# Patient Record
Sex: FEMALE | Race: WHITE | Employment: FULL TIME | URBAN - METROPOLITAN AREA
[De-identification: names, ages, dates, MRNs, and addresses within clinical notes are randomized per-mention and may not be internally consistent; named-entity substitution may affect disease eponyms.]

---

## 2017-02-22 ENCOUNTER — OFFICE VISIT (OUTPATIENT)
Dept: FAMILY MEDICINE | Facility: CLINIC | Age: 32
End: 2017-02-22
Payer: COMMERCIAL

## 2017-02-22 ENCOUNTER — HOSPITAL ENCOUNTER (OUTPATIENT)
Dept: MAMMOGRAPHY | Facility: CLINIC | Age: 32
End: 2017-02-22
Attending: FAMILY MEDICINE
Payer: COMMERCIAL

## 2017-02-22 ENCOUNTER — HOSPITAL ENCOUNTER (OUTPATIENT)
Dept: MAMMOGRAPHY | Facility: CLINIC | Age: 32
Discharge: HOME | End: 2017-02-22
Attending: FAMILY MEDICINE | Admitting: FAMILY MEDICINE
Payer: COMMERCIAL

## 2017-02-22 VITALS
BODY MASS INDEX: 22.58 KG/M2 | DIASTOLIC BLOOD PRESSURE: 68 MMHG | WEIGHT: 149 LBS | RESPIRATION RATE: 16 BRPM | HEIGHT: 68 IN | TEMPERATURE: 97.9 F | HEART RATE: 92 BPM | OXYGEN SATURATION: 99 % | SYSTOLIC BLOOD PRESSURE: 100 MMHG

## 2017-02-22 DIAGNOSIS — F41.1 GENERALIZED ANXIETY DISORDER: ICD-10-CM

## 2017-02-22 DIAGNOSIS — N63.10 LUMP OF RIGHT BREAST: ICD-10-CM

## 2017-02-22 DIAGNOSIS — N63.10 LUMP OF RIGHT BREAST: Primary | ICD-10-CM

## 2017-02-22 PROCEDURE — 76642 ULTRASOUND BREAST LIMITED: CPT | Mod: RT

## 2017-02-22 PROCEDURE — G0204 DX MAMMO INCL CAD BI: HCPCS

## 2017-02-22 PROCEDURE — 99213 OFFICE O/P EST LOW 20 MIN: CPT | Performed by: FAMILY MEDICINE

## 2017-02-22 ASSESSMENT — PATIENT HEALTH QUESTIONNAIRE - PHQ9: 5. POOR APPETITE OR OVEREATING: MORE THAN HALF THE DAYS

## 2017-02-22 ASSESSMENT — ANXIETY QUESTIONNAIRES
6. BECOMING EASILY ANNOYED OR IRRITABLE: SEVERAL DAYS
GAD7 TOTAL SCORE: 12
1. FEELING NERVOUS, ANXIOUS, OR ON EDGE: MORE THAN HALF THE DAYS
7. FEELING AFRAID AS IF SOMETHING AWFUL MIGHT HAPPEN: MORE THAN HALF THE DAYS
5. BEING SO RESTLESS THAT IT IS HARD TO SIT STILL: SEVERAL DAYS
IF YOU CHECKED OFF ANY PROBLEMS ON THIS QUESTIONNAIRE, HOW DIFFICULT HAVE THESE PROBLEMS MADE IT FOR YOU TO DO YOUR WORK, TAKE CARE OF THINGS AT HOME, OR GET ALONG WITH OTHER PEOPLE: SOMEWHAT DIFFICULT
2. NOT BEING ABLE TO STOP OR CONTROL WORRYING: MORE THAN HALF THE DAYS
3. WORRYING TOO MUCH ABOUT DIFFERENT THINGS: MORE THAN HALF THE DAYS

## 2017-02-22 NOTE — PROGRESS NOTES
SUBJECTIVE:                                                    Deb Yepez is a 31 year old female who presents to clinic today for the following health issues:      Anxiety Follow-Up    Status since last visit: Worsened in spring    Other associated symptoms:constant feeling anxious    Complicating factors:   Significant life event: No   Current substance abuse: None  Depression symptoms: No  BRADLEY-7 SCORE 12/26/2011 2/15/2012 4/10/2013   Total Score 3 3 0        GAD7           Amount of exercise or physical activity: 2-3 days/week for an average of 45-60 minutes    Problems taking medications regularly: No    Medication side effects: none  Diet: regular (no restrictions)  Mass on right breast      Duration: 2-3 months    Description (location/character/radiation): inner right breast    Intensity:      Accompanying signs and symptoms: feels like its staying same size    History (similar episodes/previous evaluation): None    Precipitating or alleviating factors: None    Therapies tried and outcome: None     .  Breast lump -     Onset: [id number]    Description:                  When did you first notice the lump: 2 month(s)  Location of lump: lower inner quadrant  Pain or tenderness: no    History:                    First degree relative with breast cancer: a negative family history for breast cancer.    Accompanying Signs & Symptoms:                   Any lumps in axillary region: no                  Movable:  YES                  Nipple discharge:  no                  Changes in the skin or nipple: no                  History of mammogram: no                  On Hormone therapy:  no     Therapies tried and outcome:time with no relief      Family History   Problem Relation Age of Onset     DIABETES Paternal Grandfather      Lipids Father          Problem list and histories reviewed & adjusted, as indicated.  Additional history: as documented    BP Readings from Last 3 Encounters:   02/22/17 100/68   07/08/16  "116/62   05/23/16 126/71    Wt Readings from Last 3 Encounters:   02/22/17 149 lb (67.6 kg)   07/08/16 151 lb (68.5 kg)   05/23/16 153 lb 14.4 oz (69.8 kg)                 ROS:  Constitutional, HEENT, cardiovascular, pulmonary, gi and gu systems are negative, except as otherwise noted.    OBJECTIVE:                                                    /68  Pulse 92  Temp 97.9  F (36.6  C) (Tympanic)  Resp 16  Ht 5' 8\" (1.727 m)  Wt 149 lb (67.6 kg)  LMP 02/04/2017 (Approximate)  SpO2 99%  BMI 22.66 kg/m2  Body mass index is 22.66 kg/(m^2).  GENERAL: healthy, alert and no distress  BREAST: no palpable axillary masses or adenopathy and mass right breast - mobile, pea sized, soft, 2 oclock  MS: no gross musculoskeletal defects noted, no edema  SKIN: no suspicious lesions or rashes  NEURO: Normal strength and tone, mentation intact and speech normal  PSYCH: mentation appears normal, affect normal/bright, anxious, judgement and insight intact and appearance well groomed  LYMPH: no cervical, supraclavicular, axillary, or inguinal adenopathy     ASSESSMENT/PLAN:                                                        Deb was seen today for mass and anxiety.    Diagnoses and all orders for this visit:    Lump of right breast  -     Cancel: US Breast Right Complete 4 Quadrants; Future  -     Cancel: MA Screening Digital Bilateral; Future  -     Cancel: MA Diagnostic Digital Bilateral; Future  -     MA Diagnostic Digital Bilateral; Future  -     US Breast Right Complete 4 Quadrants; Future      Discussed with pt; lump feels the most like benign cyst and is not worrisome on exam, but as has been there for 2 months and pt quite concerned will get mammo/us to [facility].    Discussed anxiety management as well; she's not interested in medications.  Will try below.      Patient Instructions   Call for your mammogram and ultrasound:  Please call to schedule this.    [facility]   Appointment line " "[phone]  or  [facility] Appointment line [phone]      Think about restarting YOGA 1-2 times week.  Sunday church.  --Multifaceted treatment of anxiety and depression is really important!  30-60 minutes of active cardiovascular exercise a day to really helps relieve symptoms.   --Higher intensity yoga or a meditation class can also be very helpful.   --Cognitive Behavioral Therapy can be very helpful.   Google \"CBT for Anxiety\" http://www.helpguide.org/mental/anxiety_therapy.htm  http://www.anxietybc.com/self-help-cognitive-behavioural-therapy-cbt      --Some apurva recommendations:  1) Juice by Mindbloom.  Self awareness, daily check-ins, self care.  2) Headspace - Meditation  3) Happify  4) Start - depression test, pill reminder, mood tracker  5) i am - Daily Positive Reminders  6) Pacifica - Anxiety, Stress, Depression Relief      --Some book recommendations:   1) Mind Over Mood by Segundo Villa   2) Conquering Fear: Living Boldly in an Uncertain World by Rabbi Jef Schulz  3) The Chemistry of Calm/Chemistry of Madeleine by Dr. Krunal Bailey MD  [facility]    "

## 2017-02-22 NOTE — MR AVS SNAPSHOT
"              After Visit Summary   2/22/2017    Deb Yepez    MRN: [id number]           Patient Information     Date Of Birth          1985        Visit Information        Provider Department      2/22/2017 11:45 AM Kristan Bailey MD [facility]        Today's Diagnoses     Lump of right breast    -  1      Care Instructions    Call for your mammogram and ultrasound:  Please call to schedule this.    [facility] [place]   Appointment line [phone]  or  [facility] Appointment line [phone]      Think about restarting YOGA 1-2 times week.  Sunday Christian.  --Multifaceted treatment of anxiety and depression is really important!  30-60 minutes of active cardiovascular exercise a day to really helps relieve symptoms.   --Higher intensity yoga or a meditation class can also be very helpful.   --Cognitive Behavioral Therapy can be very helpful.   Google \"CBT for Anxiety\" http://www.helpguLinki.org/mental/anxiety_therapy.htm  http://www.anxietybc.com/self-help-cognitive-behavioural-therapy-cbt      --Some apurva recommendations:  1) Juice by Mindbloom.  Self awareness, daily check-ins, self care.  2) Headspace - Meditation  3) Happify  4) Start - depression test, pill reminder, mood tracker  5) i am - Daily Positive Reminders  6) [place]a - Anxiety, Stress, Depression Relief      --Some book recommendations:   1) Mind Over Mood by Segundo Villa   2) Conquering Fear: Living Boldly in an Uncertain World by Rabbi Jef Schulz  3) The Chemistry of Calm/Chemistry of Madeleine by Dr. Krunal Kiser                Follow-ups after your visit        Future tests that were ordered for you today     Open Future Orders        Priority Expected Expires Ordered    US Breast Right Complete 4 Quadrants Routine  2/23/2018 2/22/2017    MA Screening Digital Bilateral Routine  2/23/2018 2/22/2017            Who to contact     If you have questions or need follow up " "information about today's clinic visit or your schedule please contact [facility] directly at [phone].  Normal or non-critical lab and imaging results will be communicated to you by MyChart, letter or phone within 4 business days after the clinic has received the results. If you do not hear from us within 7 days, please contact the clinic through Track[employer]hart or phone. If you have a critical or abnormal lab result, we will notify you by phone as soon as possible.  Submit refill requests through [employer] or call your pharmacy and they will forward the refill request to us. Please allow 3 business days for your refill to be completed.          Additional Information About Your Visit        TrackIFharZeo Information     [employer] gives you secure access to your electronic health record. If you see a primary care provider, you can also send messages to your care team and make appointments. If you have questions, please call your primary care clinic.  If you do not have a primary care provider, please call [phone] and they will assist you.        Care EveryWhere ID     This is your Care EveryWhere ID. This could be used by other organizations to access your [facility] medical records  [id number]        Your Vitals Were     Pulse Temperature Respirations Height Last Period Pulse Oximetry    92 97.9  F (36.6  C) (Tympanic) 16 5' 8\" (1.727 m) 02/04/2017 (Approximate) 99%    BMI (Body Mass Index)                   22.66 kg/m2            Blood Pressure from Last 3 Encounters:   02/22/17 100/68   07/08/16 116/62   05/23/16 126/71    Weight from Last 3 Encounters:   02/22/17 149 lb (67.6 kg)   07/08/16 151 lb (68.5 kg)   05/23/16 153 lb 14.4 oz (69.8 kg)               Primary Care Provider Office Phone # Fax #    Kristan Bailey [phone]       [facility] [address] [facility] [address]        Thank you!     Thank you for choosing [facility] " [facility]  for your care. Our goal is always to provide you with excellent care. Hearing back from our patients is one way we can continue to improve our services. Please take a few minutes to complete the written survey that you may receive in the mail after your visit with us. Thank you!             Your Updated Medication List - Protect others around you: Learn how to safely use, store and throw away your medicines at www.disposemymeds.org.          This list is accurate as of: 2/22/17 12:20 PM.  Always use your most recent med list.                   Brand Name Dispense Instructions for use    cephALEXin 500 MG capsule    KEFLEX     Take 500 mg by mouth 4 times daily       MULTIVITAMIN PO      Take  by mouth daily.       norethindrone 0.35 MG per tablet    MICRONOR    90 tablet    Take 1 tablet (0.35 mg) by mouth daily       predniSONE 20 MG tablet    DELTASONE    20 tablet    Take 3 tabs (60 mg) orally daily for 3 days, 2 tabs (40 mg) orally daily for 3 days, 1 tab (20 mg) orally daily for 3 days, then 1/2 tab (10 mg) orally for 3 days       SUMAtriptan 50 MG tablet    IMITREX    9 tablet    Take 1 tablet (50 mg) by mouth at onset of headache for migraine May repeat dose in 2 hours.  Do not exceed 200 mg in 24 hours

## 2017-02-22 NOTE — PATIENT INSTRUCTIONS
"Call for your mammogram and ultrasound:  Please call to schedule this.    [facility]   Appointment line [phone]  or  [facility] Appointment line [phone]      Think about restarting YOGA 1-2 times week.  Sunday Hoah[employer].  --Multifaceted treatment of anxiety and depression is really important!  30-60 minutes of active cardiovascular exercise a day to really helps relieve symptoms.   --Higher intensity yoga or a meditation class can also be very helpful.   --Cognitive Behavioral Therapy can be very helpful.   Google \"CBT for Anxiety\" http://www.helpguide.org/mental/anxiety_therapy.htm  http://www.anxietybc.com/self-help-cognitive-behavioural-therapy-cbt      --Some apurva recommendations:  1) Juice by Mindbloom.  Self awareness, daily check-ins, self care.  2) Headspace - Meditation  3) Happify  4) Start - depression test, pill reminder, mood tracker  5) i am - Daily Positive Reminders  6) Pacifica - Anxiety, Stress, Depression Relief      --Some book recommendations:   1) Mind Over Mood by Segundo Villa   2) Conquering Fear: Living Boldly in an Uncertain World by Rabbi Jef Schulz  3) The Chemistry of Calm/Chemistry of Madeleine by Dr. Krunal Kiser          "

## 2017-02-22 NOTE — NURSING NOTE
"Chief Complaint   Patient presents with     Mass     Anxiety       Initial /68  Pulse 92  Temp 97.9  F (36.6  C) (Tympanic)  Resp 16  Ht 5' 8\" (1.727 m)  Wt 149 lb (67.6 kg)  LMP 02/04/2017 (Approximate)  SpO2 99%  BMI 22.66 kg/m2 Estimated body mass index is 22.66 kg/(m^2) as calculated from the following:    Height as of this encounter: 5' 8\" (1.727 m).    Weight as of this encounter: 149 lb (67.6 kg).  Medication Reconciliation: complete   .Jairo BROWN      "

## 2017-02-22 NOTE — PROGRESS NOTES
Bree Boyd:  Great news!  Your mammogram looks normal and reassuring.  The breast center will send along a more detailed report.  Let us know if you have any questions about this.  Best,  Dr. Kristan Bailey MD  [facility]  [phone]

## 2017-02-23 ASSESSMENT — ANXIETY QUESTIONNAIRES: GAD7 TOTAL SCORE: 12

## 2017-05-04 DIAGNOSIS — Z30.41 ENCOUNTER FOR SURVEILLANCE OF CONTRACEPTIVE PILLS: ICD-10-CM

## 2017-05-04 NOTE — TELEPHONE ENCOUNTER
norethindrone (MICRONOR) 0.35 MG per tablet      Last Written Prescription Date: 5/23/16  Last Fill Quantity: 90,  # refills: 3   Last Office Visit with [facility]ISMAEL, [facility] or [facility] prescribing provider: 7/8/16

## 2017-05-05 RX ORDER — ACETAMINOPHEN AND CODEINE PHOSPHATE 120; 12 MG/5ML; MG/5ML
1 SOLUTION ORAL DAILY
Qty: 90 TABLET | Refills: 0 | Status: SHIPPED | OUTPATIENT
Start: 2017-05-05 | End: 2017-07-28

## 2017-05-05 RX ORDER — ACETAMINOPHEN AND CODEINE PHOSPHATE 120; 12 MG/5ML; MG/5ML
1 SOLUTION ORAL DAILY
Qty: 90 TABLET | Refills: 3 | OUTPATIENT
Start: 2017-05-05

## 2017-05-05 NOTE — TELEPHONE ENCOUNTER
Patient calling to note that she is completely out of this medication and the pharmacy deleted the first request earlier in the week  Please send Rx to [facility]/pharmacy #[id number] - [place], [place] - [address]

## 2017-05-05 NOTE — TELEPHONE ENCOUNTER
Prescription approved per [facility] Refill Protocol for 12 months of refills since last appointment, which was 7/8/16

## 2017-07-28 DIAGNOSIS — Z30.41 ENCOUNTER FOR SURVEILLANCE OF CONTRACEPTIVE PILLS: ICD-10-CM

## 2017-07-31 RX ORDER — ACETAMINOPHEN AND CODEINE PHOSPHATE 120; 12 MG/5ML; MG/5ML
SOLUTION ORAL
Qty: 84 TABLET | Refills: 1 | Status: SHIPPED | OUTPATIENT
Start: 2017-07-31 | End: 2017-12-08

## 2017-07-31 NOTE — TELEPHONE ENCOUNTER
NORETHINDRONE 0.35 MG TABLET      Last Written Prescription Date: 7/28/17  Last Fill Quantity: 90,  # refills: 2   Last Office Visit with [facility], [facility] or [facility] prescribing provider: 2/22/17

## 2017-12-08 ENCOUNTER — OFFICE VISIT (OUTPATIENT)
Dept: FAMILY MEDICINE | Facility: CLINIC | Age: 32
End: 2017-12-08
Payer: COMMERCIAL

## 2017-12-08 VITALS
OXYGEN SATURATION: 100 % | TEMPERATURE: 98.2 F | SYSTOLIC BLOOD PRESSURE: 105 MMHG | HEART RATE: 93 BPM | WEIGHT: 147 LBS | BODY MASS INDEX: 23.07 KG/M2 | DIASTOLIC BLOOD PRESSURE: 62 MMHG | HEIGHT: 67 IN

## 2017-12-08 DIAGNOSIS — R10.2 PELVIC PAIN: Primary | ICD-10-CM

## 2017-12-08 LAB
ALBUMIN UR-MCNC: NEGATIVE MG/DL
APPEARANCE UR: CLEAR
BILIRUB UR QL STRIP: NEGATIVE
COLOR UR AUTO: YELLOW
GLUCOSE UR STRIP-MCNC: NEGATIVE MG/DL
HGB UR QL STRIP: NEGATIVE
KETONES UR STRIP-MCNC: NEGATIVE MG/DL
LEUKOCYTE ESTERASE UR QL STRIP: NEGATIVE
NITRATE UR QL: NEGATIVE
PH UR STRIP: 7 PH (ref 5–7)
SOURCE: NORMAL
SP GR UR STRIP: 1.01 (ref 1–1.03)
UROBILINOGEN UR STRIP-ACNC: 0.2 EU/DL (ref 0.2–1)

## 2017-12-08 PROCEDURE — 99214 OFFICE O/P EST MOD 30 MIN: CPT | Performed by: FAMILY MEDICINE

## 2017-12-08 PROCEDURE — 81003 URINALYSIS AUTO W/O SCOPE: CPT | Performed by: FAMILY MEDICINE

## 2017-12-08 NOTE — MR AVS SNAPSHOT
After Visit Summary   12/8/2017    Deb Yepez    MRN: [id number]           Patient Information     Date Of Birth          1985        Visit Information        Provider Department      12/8/2017 2:00 PM Kristan Bailey MD [facility]        Today's Diagnoses     Pelvic pain    -  1      Care Instructions    1) Call for ultrasound if this doesn't get better in the next few days or week.  Call to schedule:  For [facility] or [facility] call [phone].    2) If this doesn't get better, let's look at kidney stones as a possibility.  This would involve a CT scan - send me a mychart evisit if you are not improving and we can order this.      Pelvic Pain, Uncertain Cause    Pelvic pain is pain felt in the lowest part of the belly (abdomen) and between the hipbones. The pain may be acute. This means it occurred suddenly and recently. Or the pain may be chronic. This means it has occurred for 6 months or longer.  There are many possible causes of pelvic pain. The pain may be due to a problem in the female reproductive system (pictured here). Or, it may be due to a problem in the digestive, urinary, or musculoskeletal systems.  Based on your visit today, the exact cause of your pelvic pain is not certain. Your condition does not appear to be serious at this time. But it is important for you to keep watching for any new symptoms or worsening of your condition.  General care  Your healthcare provider may advise a number of ways to help manage your pain. These can include:    Taking over-the-counter pain medicine. Stronger pain medicine may also be prescribed, if needed.    Applying heat to the pelvic area. Use a heating pad or a hot pack. Taking a hot bath may also help.    Getting plenty of rest.    Making certain lifestyle changes. These can include practicing good posture and getting regular exercise. (Studies have shown that these changes help reduce pelvic  pain in some women.)    Seeing a physical therapist or pain specialist. These healthcare providers can discuss other ways to manage pain with you.  Follow-up care  Follow up with your healthcare provider, or as advised.   When to seek medical advice  Call your healthcare provider right away if any of the following occur:    Fever of 100.4 F or higher, or as directed by your healthcare provider    Pain worsens or you have sudden, severe pain or new pain    Nausea, vomiting, sweating, or restlessness    Dizziness or fainting    Unusual vaginal discharge    Abnormal vaginal bleeding (especially bleeding after menopause)  Date Last Reviewed: 6/11/2015 2000-2017 [employer]. [address] [place] [address]. All rights reserved. This information is not intended as a substitute for professional medical care. Always follow your healthcare professional's instructions.                Follow-ups after your visit        Future tests that were ordered for you today     Open Future Orders        Priority Expected Expires Ordered    US Pelvic Complete with Transvaginal Routine  12/9/2018 12/8/2017            Who to contact     If you have questions or need follow up information about today's clinic visit or your schedule please contact [facility] directly at [phone].  Normal or non-critical lab and imaging results will be communicated to you by MyChart, letter or phone within 4 business days after the clinic has received the results. If you do not hear from us within 7 days, please contact the clinic through MyChart or phone. If you have a critical or abnormal lab result, we will notify you by phone as soon as possible.  Submit refill requests through [employer] or call your pharmacy and they will forward the refill request to us. Please allow 3 business days for your refill to be completed.          Additional Information About Your Visit        MyChart Information   "   [employer] gives you secure access to your electronic health record. If you see a primary care provider, you can also send messages to your care team and make appointments. If you have questions, please call your primary care clinic.  If you do not have a primary care provider, please call [phone] and they will assist you.        Care EveryWhere ID     This is your Care EveryWhere ID. This could be used by other organizations to access your [facility] medical records  [id number]        Your Vitals Were     Pulse Temperature Height Last Period Pulse Oximetry Breastfeeding?    93 98.2  F (36.8  C) (Oral) 5' 7\" (1.702 m) 11/08/2017 (Approximate) 100% No    BMI (Body Mass Index)                   23.02 kg/m2            Blood Pressure from Last 3 Encounters:   12/08/17 105/62   02/22/17 100/68   07/08/16 116/62    Weight from Last 3 Encounters:   12/08/17 147 lb (66.7 kg)   02/22/17 149 lb (67.6 kg)   07/08/16 151 lb (68.5 kg)              We Performed the Following     UA reflex to Microscopic        Primary Care Provider Office Phone # Fax #    Kristan Bailey [phone] [address] [id number]        Equal Access to Services     TERI CARTER : Hadii evie ku hadasho Soomaali, waaxda luqadaha, qaybta kaalmada adeegyada, waxay jaceyin lencho banuelos. So [facility] [phone].    ATENCIÓN: Si habla español, tiene a hobson disposición servicios gratuitos de asistencia lingüística. Llame al [phone].    We comply with applicable federal civil rights laws and [place] laws. We do not discriminate on the basis of race, color, national origin, age, disability, sex, sexual orientation, or gender identity.            Thank you!     Thank you for choosing [facility]  for your care. Our goal is always to provide you with excellent care. Hearing back from our patients is one way we can continue to improve our services. Please take a few minutes " to complete the written survey that you may receive in the mail after your visit with us. Thank you!             Your Updated Medication List - Protect others around you: Learn how to safely use, store and throw away your medicines at www.disposemymeds.org.          This list is accurate as of: 12/8/17  2:45 PM.  Always use your most recent med list.                   Brand Name Dispense Instructions for use Diagnosis    MULTIVITAMIN PO      Take  by mouth daily.        * norethindrone 0.35 MG per tablet    MICRONOR    90 tablet    Take 1 tablet (0.35 mg) by mouth daily    Encounter for surveillance of contraceptive pills       * norethindrone 0.35 MG per tablet    MICRONOR    84 tablet    TAKE 1 TABLET (0.35 MG) BY MOUTH DAILY    Encounter for surveillance of contraceptive pills       SUMAtriptan 50 MG tablet    IMITREX    9 tablet    Take 1 tablet (50 mg) by mouth at onset of headache for migraine May repeat dose in 2 hours.  Do not exceed 200 mg in 24 hours    Migraine aura without headache       * Notice:  This list has 2 medication(s) that are the same as other medications prescribed for you. Read the directions carefully, and ask your doctor or other care provider to review them with you.

## 2017-12-08 NOTE — PATIENT INSTRUCTIONS
1) Call for ultrasound if this doesn't get better in the next few days or week.  Call to schedule:  For [place] or [facility] call [phone].    2) If this doesn't get better, let's look at kidney stones as a possibility.  This would involve a CT scan - send me a mychart evisit if you are not improving and we can order this.      Pelvic Pain, Uncertain Cause    Pelvic pain is pain felt in the lowest part of the belly (abdomen) and between the hipbones. The pain may be acute. This means it occurred suddenly and recently. Or the pain may be chronic. This means it has occurred for 6 months or longer.  There are many possible causes of pelvic pain. The pain may be due to a problem in the female reproductive system (pictured here). Or, it may be due to a problem in the digestive, urinary, or musculoskeletal systems.  Based on your visit today, the exact cause of your pelvic pain is not certain. Your condition does not appear to be serious at this time. But it is important for you to keep watching for any new symptoms or worsening of your condition.  General care  Your healthcare provider may advise a number of ways to help manage your pain. These can include:    Taking over-the-counter pain medicine. Stronger pain medicine may also be prescribed, if needed.    Applying heat to the pelvic area. Use a heating pad or a hot pack. Taking a hot bath may also help.    Getting plenty of rest.    Making certain lifestyle changes. These can include practicing good posture and getting regular exercise. (Studies have shown that these changes help reduce pelvic pain in some women.)    Seeing a physical therapist or pain specialist. These healthcare providers can discuss other ways to manage pain with you.  Follow-up care  Follow up with your healthcare provider, or as advised.   When to seek medical advice  Call your healthcare provider right away if any of the following occur:    Fever of 100.4 F or higher, or as directed  by your healthcare provider    Pain worsens or you have sudden, severe pain or new pain    Nausea, vomiting, sweating, or restlessness    Dizziness or fainting    Unusual vaginal discharge    Abnormal vaginal bleeding (especially bleeding after menopause)  Date Last Reviewed: 6/11/2015 2000-2017 [employer]. [address] [place] [address]. All rights reserved. This information is not intended as a substitute for professional medical care. Always follow your healthcare professional's instructions.

## 2017-12-08 NOTE — PROGRESS NOTES
SUBJECTIVE:   Deb Yepez is a 32 year old female who presents to clinic today for the following health issues:      ABDOMINAL   PAIN     Onset: x 2 week     Description:   Character: Dull ache - sharp pain with movement   Location: left upper quadrant left lower quadrant  Radiation: lower back     Intensity: 5/10    Progression of Symptoms:  same    Accompanying Signs & Symptoms:  Fever/Chills?: no   Gas/Bloating: no   Nausea: no   Vomitting: no   Diarrhea?: no   Constipation:no   Dysuria or Hematuria: no    History:   Trauma: no   Previous similar pain: no    Previous tests done: none    Precipitating factors:   Does the pain change with:     Food: no      BM: no     Urination: no     Alleviating factors:    None         Therapies Tried and outcome:     Self stopped - drinking coffee and alcohol - Did not notice a difference.     Has tried liquid diet - shows no improvement     LMP:  Patient's last menstrual period was 11/08/2017 (approximate).         Started the Sunday after Thanksgiving.  Woke her up at night and was shooting pain.  Now still dull achy pain in left side and left lowerside.  Radiates to back.      Has tried cutting out alcohol, coffee, etc.  Tried liquid diet.  No change.  Doesn't seem related to eating.  In mornings notice it less.  During work day isn't sobad.  If moves really fast can feel it.  But sneezed this morning and was okay.  In evenings notices it more.      Tried to do less workout, no weights.  Doesn't feel like muscle strain.      Not worse when urinates.  Dad had kidney stones.      Problem list and histories reviewed & adjusted, as indicated.  Additional history: as documented    Reviewed and updated as needed this visit by clinical staffTobacco  Allergies  Meds  Problems  Med Hx  Surg Hx  Fam Hx  Soc Hx        Reviewed and updated as needed this visit by Provider  Allergies  Meds  Problems         ROS:  Constitutional, HEENT, cardiovascular, pulmonary, gi and gu  "systems are negative, except as otherwise noted.      OBJECTIVE:   /62 (BP Location: Left arm, Patient Position: Chair, Cuff Size: Adult Regular)  Pulse 93  Temp 98.2  F (36.8  C) (Oral)  Ht 5' 7\" (1.702 m)  Wt 147 lb (66.7 kg)  LMP 11/08/2017 (Approximate)  SpO2 100%  Breastfeeding? No  BMI 23.02 kg/m2  Body mass index is 23.02 kg/(m^2).  GENERAL: healthy, alert and no distress  NECK: no adenopathy, no asymmetry, masses, or scars and thyroid normal to palpation  RESP: lungs clear to auscultation - no rales, rhonchi or wheezes  CV: regular rate and rhythm, normal S1 S2, no S3 or S4, no murmur, click or rub, no peripheral edema and peripheral pulses strong  ABDOMEN: tenderness RLQ, no organomegaly or masses, liver span normal to percussion, bowel sounds normal and no palpable or pulsatile masses  MS: no gross musculoskeletal defects noted, no edema    Diagnostic Test Results:  Unresulted Labs Ordered in the Past 30 Days of this Admission     No orders found from 10/9/2017 to 12/9/2017.            ASSESSMENT/PLAN:     Deb was seen today for abdominal pain and imm/inj.    Diagnoses and all orders for this visit:    Pelvic pain  -     US Pelvic Complete with Transvaginal; Future  -     UA reflex to Microscopic        Patient Instructions   1) Call for ultrasound if this doesn't get better in the next few days or week.  Call to schedule:  For [place] or the [place] call [phone].    2) If this doesn't get better, let's look at kidney stones as a possibility.  This would involve a CT scan - send me a mychart evisit if you are not improving and we can order this.      Pelvic Pain, Uncertain Cause    Pelvic pain is pain felt in the lowest part of the belly (abdomen) and between the hipbones. The pain may be acute. This means it occurred suddenly and recently. Or the pain may be chronic. This means it has occurred for 6 months or longer.  There are many possible causes of pelvic pain. The pain may be " due to a problem in the female reproductive system (pictured here). Or, it may be due to a problem in the digestive, urinary, or musculoskeletal systems.  Based on your visit today, the exact cause of your pelvic pain is not certain. Your condition does not appear to be serious at this time. But it is important for you to keep watching for any new symptoms or worsening of your condition.  General care  Your healthcare provider may advise a number of ways to help manage your pain. These can include:    Taking over-the-counter pain medicine. Stronger pain medicine may also be prescribed, if needed.    Applying heat to the pelvic area. Use a heating pad or a hot pack. Taking a hot bath may also help.    Getting plenty of rest.    Making certain lifestyle changes. These can include practicing good posture and getting regular exercise. (Studies have shown that these changes help reduce pelvic pain in some women.)    Seeing a physical therapist or pain specialist. These healthcare providers can discuss other ways to manage pain with you.  Follow-up care  Follow up with your healthcare provider, or as advised.   When to seek medical advice  Call your healthcare provider right away if any of the following occur:    Fever of 100.4 F or higher, or as directed by your healthcare provider    Pain worsens or you have sudden, severe pain or new pain    Nausea, vomiting, sweating, or restlessness    Dizziness or fainting    Unusual vaginal discharge    Abnormal vaginal bleeding (especially bleeding after menopause)  Date Last Reviewed: 6/11/2015 2000-2017 [employer] [address] [place] [address], [place] [address]. All rights reserved. This information is not intended as a substitute for professional medical care. Always follow your healthcare professional's instructions.            Kristan Bailey MD  [facility]

## 2017-12-08 NOTE — PROGRESS NOTES
Good news, Deb!   Your results are normal.  Let me know if you have any questions.  Dr. Kristan Bailey MD  [facility]  [phone]

## 2017-12-08 NOTE — NURSING NOTE
"Chief Complaint   Patient presents with     Abdominal Pain       Initial /62 (BP Location: Left arm, Patient Position: Chair, Cuff Size: Adult Regular)  Pulse 93  Temp 98.2  F (36.8  C) (Oral)  Ht 5' 7\" (1.702 m)  Wt 147 lb (66.7 kg)  LMP 11/08/2017 (Approximate)  SpO2 100%  Breastfeeding? No  BMI 23.02 kg/m2 Estimated body mass index is 23.02 kg/(m^2) as calculated from the following:    Height as of this encounter: 5' 7\" (1.702 m).    Weight as of this encounter: 147 lb (66.7 kg).  Medication Reconciliation: complete     Xenia Jimenez MA     "

## 2017-12-22 ENCOUNTER — E-VISIT (OUTPATIENT)
Dept: FAMILY MEDICINE | Facility: CLINIC | Age: 32
End: 2017-12-22
Payer: COMMERCIAL

## 2017-12-22 DIAGNOSIS — Z53.9 ERRONEOUS ENCOUNTER--DISREGARD: Primary | ICD-10-CM

## 2018-04-10 ENCOUNTER — OFFICE VISIT (OUTPATIENT)
Dept: FAMILY MEDICINE | Facility: CLINIC | Age: 33
End: 2018-04-10
Payer: COMMERCIAL

## 2018-04-10 VITALS
TEMPERATURE: 99.1 F | WEIGHT: 146 LBS | HEIGHT: 67 IN | SYSTOLIC BLOOD PRESSURE: 122 MMHG | OXYGEN SATURATION: 99 % | DIASTOLIC BLOOD PRESSURE: 72 MMHG | BODY MASS INDEX: 22.91 KG/M2 | HEART RATE: 112 BPM

## 2018-04-10 DIAGNOSIS — Z00.00 ROUTINE GENERAL MEDICAL EXAMINATION AT A HEALTH CARE FACILITY: Primary | ICD-10-CM

## 2018-04-10 DIAGNOSIS — N63.0 LUMP OR MASS IN BREAST: ICD-10-CM

## 2018-04-10 DIAGNOSIS — Z12.4 SCREENING FOR MALIGNANT NEOPLASM OF CERVIX: ICD-10-CM

## 2018-04-10 DIAGNOSIS — F41.1 GENERALIZED ANXIETY DISORDER: ICD-10-CM

## 2018-04-10 PROCEDURE — 87624 HPV HI-RISK TYP POOLED RSLT: CPT | Performed by: FAMILY MEDICINE

## 2018-04-10 PROCEDURE — G0145 SCR C/V CYTO,THINLAYER,RESCR: HCPCS | Performed by: FAMILY MEDICINE

## 2018-04-10 PROCEDURE — 84443 ASSAY THYROID STIM HORMONE: CPT | Performed by: FAMILY MEDICINE

## 2018-04-10 PROCEDURE — 80061 LIPID PANEL: CPT | Performed by: FAMILY MEDICINE

## 2018-04-10 PROCEDURE — 36415 COLL VENOUS BLD VENIPUNCTURE: CPT | Performed by: FAMILY MEDICINE

## 2018-04-10 PROCEDURE — 99395 PREV VISIT EST AGE 18-39: CPT | Performed by: FAMILY MEDICINE

## 2018-04-10 PROCEDURE — 99213 OFFICE O/P EST LOW 20 MIN: CPT | Mod: 25 | Performed by: FAMILY MEDICINE

## 2018-04-10 NOTE — PROGRESS NOTES
SUBJECTIVE:   CC: Deb Yepez is an 32 year old woman who presents for preventive health visit.     Physical   Annual:     Getting at least 3 servings of Calcium per day::  Yes    Bi-annual eye exam::  Yes    Dental care twice a year::  Yes    Sleep apnea or symptoms of sleep apnea::  None    Diet::  Regular (no restrictions)    Frequency of exercise::  2-3 days/week    Duration of exercise::  15-30 minutes    Taking medications regularly::  Yes    Medication side effects::  Not applicable    Additional concerns today::  YES            Also, she has additional complaints of:  Pt feels like breast tissue is changing in last 1 month, had similar last year had mammo things seemed normal, also swollen glands in neck about 3 weeks.    Also, anxiety is worse.  Getting  in June, doesn't know if that's part of it?  Switched jobs which is good; but still hard to do self care.  Doesn't see a therapist.  Medications worked well in past but really doesn't want to be on medications longterm.    Today's PHQ-2 Score:   PHQ-2 ( 1999 [employer]) 4/10/2018   Q1: Little interest or pleasure in doing things 0   Q2: Feeling down, depressed or hopeless 1   PHQ-2 Score 1   Q1: Little interest or pleasure in doing things Not at all   Q2: Feeling down, depressed or hopeless Several days   PHQ-2 Score 1       Abuse: Current or Past(Physical, Sexual or Emotional)- No  Do you feel safe in your environment - Yes    Social History   Substance Use Topics     Smoking status: Former Smoker     Smokeless tobacco: Never Used     Alcohol use Yes      Comment: occ     Alcohol Use 4/10/2018   If you drink alcohol do you typically have greater than 3 drinks per day OR greater than 7 drinks per week? No       Reviewed orders with patient.  Reviewed health maintenance and updated orders accordingly - Yes  Labs reviewed in EPIC        Pertinent mammograms are reviewed under the imaging tab.  History of abnormal Pap smear:   YES - updated in Problem  "List and Health Maintenance accordingly  Last 3 Pap and HPV Results:   PAP / HPV Latest Ref Rng & Units 4/23/2015 4/14/2014 2/15/2012   PAP - NIL LSIL(A) NIL   HPV 16 DNA NEG Negative - -   HPV 18 DNA NEG Negative - -   OTHER HR HPV NEG Negative - -       Reviewed and updated as needed this visit by clinical staff  Tobacco  Allergies  Meds  Problems  Med Hx  Surg Hx  Fam Hx  Soc Hx          Reviewed and updated as needed this visit by Provider  Allergies  Meds  Problems            Review of Systems  C: NEGATIVE for fever, chills, change in weight  I: NEGATIVE for worrisome rashes, moles or lesions  E: NEGATIVE for vision changes or irritation  ENT: NEGATIVE for ear, mouth and throat problems  R: NEGATIVE for significant cough or SOB  B: NEGATIVE for masses, tenderness or discharge  CV: NEGATIVE for chest pain, palpitations or peripheral edema  GI: NEGATIVE for nausea, abdominal pain, heartburn, or change in bowel habits  : NEGATIVE for unusual urinary or vaginal symptoms. Periods are regular.  M: NEGATIVE for significant arthralgias or myalgia  N: NEGATIVE for weakness, dizziness or paresthesias  P: NEGATIVE for changes in mood or affect     OBJECTIVE:   /72 (Cuff Size: Adult Regular)  Pulse 112  Temp 99.1  F (37.3  C) (Tympanic)  Ht 5' 7\" (1.702 m)  Wt 146 lb (66.2 kg)  LMP 03/16/2018 (Approximate)  SpO2 99%  BMI 22.87 kg/m2  Physical Exam  GENERAL: healthy, alert and no distress  EYES: Eyes grossly normal to inspection, PERRL and conjunctivae and sclerae normal  HENT: ear canals and TM's normal, nose and mouth without ulcers or lesions  NECK: no adenopathy, no asymmetry, masses, or scars and thyroid normal to palpation  RESP: lungs clear to auscultation - no rales, rhonchi or wheezes  BREAST: dense breast tissue, lump versus dense breast tissue right breast at 10 oclock  CV: regular rate and rhythm, normal S1 S2, no S3 or S4, no murmur, click or rub, no peripheral edema and peripheral " "pulses strong  ABDOMEN: soft, nontender, no hepatosplenomegaly, no masses and bowel sounds normal   (female): normal female external genitalia, normal urethral meatus, vaginal mucosa pink, moist, well rugated, and normal cervix/adnexa/uterus without masses or discharge  MS: no gross musculoskeletal defects noted, no edema  SKIN: no suspicious lesions or rashes  NEURO: Normal strength and tone, mentation intact and speech normal  PSYCH: mentation appears normal, affect normal/bright    ASSESSMENT/PLAN:       ICD-10-CM    1. Routine general medical examination at a health care facility Z00.00 TSH with free T4 reflex     Lipid panel reflex to direct LDL Non-fasting   2. Screening for malignant neoplasm of cervix Z12.4 Pap imaged thin layer screen with HPV - recommended age 30 - 65 years (select HPV order below)     HPV High Risk Types DNA Cervical   3. Lump or mass in breast N63.0 MA Diagnostic Digital Right     US Breast Right Complete 4 Quadrants     For breast lump:  Call to schedule your diagnostic mammogram and ultrasound:  [facility]   Appointment line [phone]  or  [facility] Appointment line [phone]    For anxiety:   Start therapy.  If no improvement, we can restart med.  Check TSH today.  Therapists listed in AVS.    COUNSELING:  Reviewed preventive health counseling, as reflected in patient instructions         reports that she has quit smoking. She has never used smokeless tobacco.    Estimated body mass index is 22.87 kg/(m^2) as calculated from the following:    Height as of this encounter: 5' 7\" (1.702 m).    Weight as of this encounter: 146 lb (66.2 kg).       Counseling Resources:  ATP IV Guidelines  Pooled Cohorts Equation Calculator  Breast Cancer Risk Calculator  FRAX Risk Assessment  ICSI Preventive Guidelines  Dietary Guidelines for Americans, 2010  USDA's MyPlate  ASA Prophylaxis  Lung CA Screening    Kristan Bailey MD  [facility]  Answers for HPI/ROS submitted by the patient on 4/10/2018   PHQ-2 Score: 1

## 2018-04-10 NOTE — MR AVS SNAPSHOT
After Visit Summary   4/10/2018    Deb Yepez    MRN: [id number]           Patient Information     Date Of Birth          1985        Visit Information        Provider Department      4/10/2018 4:00 PM Kristan Bailey MD [facility]        Today's Diagnoses     Routine general medical examination at a health care facility    -  1    Screening for malignant neoplasm of cervix        Lump or mass in breast          Care Instructions    Call to schedule your diagnostic mammogram and ultrasound:  [facility]   Appointment line [phone]  or  [facility] Appointment line [phone]    Counselors:  Anxiety:  Ana Jacob  [email or web address]  [place]  [phone]    Marina Paez  [place]  [phone]    Dr. Olivia Hooks  [email or web address]  [phone]    [facility]  [email or web address]  [place]  [phone]          Preventive Health Recommendations  Female Ages 26 - 39  Yearly exam:   See your health care provider every year in order to    Review health changes.     Discuss preventive care.      Review your medicines if you your doctor has prescribed any.    Until age 30: Get a Pap test every three years (more often if you have had an abnormal result).    After age 30: Talk to your doctor about whether you should have a Pap test every 3 years or have a Pap test with HPV screening every 5 years.   You do not need a Pap test if your uterus was removed (hysterectomy) and you have not had cancer.  You should be tested each year for STDs (sexually transmitted diseases), if you're at risk.   Talk to your provider about how often to have your cholesterol checked.  If you are at risk for diabetes, you should have a diabetes test (fasting glucose).  Shots: Get a flu shot each year. Get a tetanus shot every 10 years.   Nutrition:     Eat at least 5 servings of fruits and vegetables each  day.    Eat whole-grain bread, whole-wheat pasta and brown rice instead of white grains and rice.    Talk to your provider about Calcium and Vitamin D.     Lifestyle    Exercise at least 150 minutes a week (30 minutes a day, 5 days of the week). This will help you control your weight and prevent disease.    Limit alcohol to one drink per day.    No smoking.     Wear sunscreen to prevent skin cancer.    See your dentist every six months for an exam and cleaning.            Follow-ups after your visit        Future tests that were ordered for you today     Open Future Orders        Priority Expected Expires Ordered    MA Diagnostic Digital Right Routine  4/10/2019 4/10/2018     Breast Right Complete 4 Quadrants Routine  4/11/2019 4/10/2018            Who to contact     If you have questions or need follow up information about today's clinic visit or your schedule please contact [facility] directly at [phone].  Normal or non-critical lab and imaging results will be communicated to you by Tekorahart, letter or phone within 4 business days after the clinic has received the results. If you do not hear from us within 7 days, please contact the clinic through T[employer]or[employer] or phone. If you have a critical or abnormal lab result, we will notify you by phone as soon as possible.  Submit refill requests through Run3D or call your pharmacy and they will forward the refill request to us. Please allow 3 business days for your refill to be completed.          Additional Information About Your Visit        [employer] Information     [employer] gives you secure access to your electronic health record. If you see a primary care provider, you can also send messages to your care team and make appointments. If you have questions, please call your primary care clinic.  If you do not have a primary care provider, please call [phone] and they will assist you.        Care EveryWhere ID     This is your Care  "EveryWhere ID. This could be used by other organizations to access your [place] medical records  [id number]        Your Vitals Were     Pulse Temperature Height Last Period Pulse Oximetry BMI (Body Mass Index)    112 99.1  F (37.3  C) (Tympanic) 5' 7\" (1.702 m) 03/16/2018 (Approximate) 99% 22.87 kg/m2       Blood Pressure from Last 3 Encounters:   04/10/18 122/72   12/08/17 105/62   02/22/17 100/68    Weight from Last 3 Encounters:   04/10/18 146 lb (66.2 kg)   12/08/17 147 lb (66.7 kg)   02/22/17 149 lb (67.6 kg)              We Performed the Following     HPV High Risk Types DNA Cervical     Pap imaged thin layer screen with HPV - recommended age 30 - 65 years (select HPV order below)        Primary Care Provider Office Phone # Fax #    Kristan Bailey [phone] [address] [facility] [address]        Equal Access to Services     TERI CARTER : Hadii evie ku hadasho Soomaali, waaxda luqadaha, qaybta kaalmada adeegyada, waxay jaceyin hayvictoriano shipman . So [facility] [phone].    ATENCIÓN: Si habla español, tiene a hobson disposición servicios gratuitos de asistencia lingüística. [facility] [phone].    We comply with applicable federal civil rights laws and [place] laws. We do not discriminate on the basis of race, color, national origin, age, disability, sex, sexual orientation, or gender identity.            Thank you!     Thank you for choosing [facility]  for your care. Our goal is always to provide you with excellent care. Hearing back from our patients is one way we can continue to improve our services. Please take a few minutes to complete the written survey that you may receive in the mail after your visit with us. Thank you!             Your Updated Medication List - Protect others around you: Learn how to safely use, store and throw away your medicines at www.disposemymeds.org.          This list is accurate as of 4/10/18  4:29 " PM.  Always use your most recent med list.                   Brand Name Dispense Instructions for use Diagnosis    MULTIVITAMIN PO      Take  by mouth daily.        norethindrone 0.35 MG per tablet    MICRONOR    90 tablet    Take 1 tablet (0.35 mg) by mouth daily    Encounter for surveillance of contraceptive pills       SUMAtriptan 50 MG tablet    IMITREX    9 tablet    Take 1 tablet (50 mg) by mouth at onset of headache for migraine May repeat dose in 2 hours.  Do not exceed 200 mg in 24 hours    Migraine aura without headache

## 2018-04-10 NOTE — PATIENT INSTRUCTIONS
Call to schedule your diagnostic mammogram and ultrasound:  [facility]   Appointment line [phone]  or  [facility] Appointment line [phone]    Counselors:  Anxiety:  Ana Jacob  [email or web address]  [place]  [phone]    Marina Paez  [place]  [phone]    Dr. Olivia Hooks  [email or web address]  [phone]    [facility]  [email or web address]  [place]  [phone]          Preventive Health Recommendations  Female Ages 26 - 39  Yearly exam:   See your health care provider every year in order to    Review health changes.     Discuss preventive care.      Review your medicines if you your doctor has prescribed any.    Until age 30: Get a Pap test every three years (more often if you have had an abnormal result).    After age 30: Talk to your doctor about whether you should have a Pap test every 3 years or have a Pap test with HPV screening every 5 years.   You do not need a Pap test if your uterus was removed (hysterectomy) and you have not had cancer.  You should be tested each year for STDs (sexually transmitted diseases), if you're at risk.   Talk to your provider about how often to have your cholesterol checked.  If you are at risk for diabetes, you should have a diabetes test (fasting glucose).  Shots: Get a flu shot each year. Get a tetanus shot every 10 years.   Nutrition:     Eat at least 5 servings of fruits and vegetables each day.    Eat whole-grain bread, whole-wheat pasta and brown rice instead of white grains and rice.    Talk to your provider about Calcium and Vitamin D.     Lifestyle    Exercise at least 150 minutes a week (30 minutes a day, 5 days of the week). This will help you control your weight and prevent disease.    Limit alcohol to one drink per day.    No smoking.     Wear sunscreen to prevent skin cancer.    See your dentist every six months for an exam and cleaning.

## 2018-04-11 LAB
CHOLEST SERPL-MCNC: 162 MG/DL
HDLC SERPL-MCNC: 32 MG/DL
LDLC SERPL CALC-MCNC: 119 MG/DL
NONHDLC SERPL-MCNC: 130 MG/DL
TRIGL SERPL-MCNC: 55 MG/DL
TSH SERPL DL<=0.005 MIU/L-ACNC: 0.97 MU/L (ref 0.4–4)

## 2018-04-11 NOTE — PROGRESS NOTES
Good news, Deb!   Your results are normal.  Let me know if you have any questions.  Dr. Kristan Bailey MD  [facility]  [phone]

## 2018-04-12 ENCOUNTER — HOSPITAL ENCOUNTER (OUTPATIENT)
Dept: MAMMOGRAPHY | Facility: CLINIC | Age: 33
Discharge: HOME | End: 2018-04-12
Attending: FAMILY MEDICINE | Admitting: FAMILY MEDICINE
Payer: COMMERCIAL

## 2018-04-12 ENCOUNTER — HOSPITAL ENCOUNTER (OUTPATIENT)
Dept: MAMMOGRAPHY | Facility: CLINIC | Age: 33
End: 2018-04-12
Attending: FAMILY MEDICINE
Payer: COMMERCIAL

## 2018-04-12 DIAGNOSIS — N63.0 LUMP OR MASS IN BREAST: ICD-10-CM

## 2018-04-12 PROCEDURE — G0279 TOMOSYNTHESIS, MAMMO: HCPCS

## 2018-04-12 PROCEDURE — 76642 ULTRASOUND BREAST LIMITED: CPT | Mod: 50

## 2018-04-12 NOTE — PROGRESS NOTES
Bree Boyd:  Good news!  Your mammogram/ultrasound is normal and reassuring.  The breast center will send along a more detailed report.  Let us know if you have any questions about this.  Best,  Dr. Kristna Bailey MD  [facility]  [phone]

## 2018-04-12 NOTE — LETTER
[facility]  [address], [address]  [facility] [address]                                                                                                            Deb [address]  [place] [address]      April 12, 2018  Date of Exam:     Dear Deb:    Thank you for your recent visit.  Breast Imaging Result: We are pleased to inform you that the results of your recent breast imaging show no evidence of malignancy (cancer).    If you are experiencing any breast problems such as a lump or localized pain we request that you discuss this with your health care provider if you haven t already done so, as additional testing may be necessary.    As you know, early detection of cancer is very important. Although mammography is the most accurate method for early detection, not all cancers are found through mammography. A thorough examination includes a combination of mammography, physical examination and breast self-examination. Currently the American College of Radiology and the Society of Breast Imaging recommend an annual mammogram for all women beginning at the age of 40.    A report of your breast imaging results was sent to: Kristan Bailey    Your breast imaging will become part of your medical file here at [facility] for at least 10 years. You are responsible for informing any new health care provider or breast imaging facility of the date and location of this examination.    We appreciate the opportunity to participate in your health care.    Sincerely,    Remington Alvarez MD  Interpreting Radiologist  [facility]

## 2018-04-16 LAB
COPATH REPORT: NORMAL
PAP: NORMAL

## 2018-04-18 LAB
FINAL DIAGNOSIS: NORMAL
HPV HR 12 DNA CVX QL NAA+PROBE: NEGATIVE
HPV16 DNA SPEC QL NAA+PROBE: NEGATIVE
HPV18 DNA SPEC QL NAA+PROBE: NEGATIVE
SPECIMEN DESCRIPTION: NORMAL
SPECIMEN SOURCE CVX/VAG CYTO: NORMAL

## 2018-05-01 ENCOUNTER — OFFICE VISIT (OUTPATIENT)
Dept: FAMILY MEDICINE | Facility: CLINIC | Age: 33
End: 2018-05-01
Payer: COMMERCIAL

## 2018-05-01 VITALS
TEMPERATURE: 98.6 F | BODY MASS INDEX: 22.87 KG/M2 | DIASTOLIC BLOOD PRESSURE: 66 MMHG | SYSTOLIC BLOOD PRESSURE: 120 MMHG | WEIGHT: 146 LBS | HEART RATE: 105 BPM | OXYGEN SATURATION: 98 %

## 2018-05-01 DIAGNOSIS — R22.1 MASS OF LEFT SIDE OF NECK: ICD-10-CM

## 2018-05-01 DIAGNOSIS — R13.10 PAINFUL SWALLOWING: Primary | ICD-10-CM

## 2018-05-01 PROCEDURE — 99213 OFFICE O/P EST LOW 20 MIN: CPT | Performed by: FAMILY MEDICINE

## 2018-05-01 NOTE — PROGRESS NOTES
SUBJECTIVE:   Deb Yepez is a 32 year old female who presents to clinic today for the following health issues:      throat      Duration: 1 1/2 month    Description (location/character/radiation): throat    Intensity:  moderate, severe    Accompanying signs and symptoms: swollen glands    History (similar episodes/previous evaluation): None    Precipitating or alleviating factors: None    Therapies tried and outcome: None     32 year old here today for left sided neck issue.  Just under her ear on left side.  Feels like swollen lymph nodes like when you get sick.  When clears throat a bit of phlegm.  Has tried salt water gargles, hot compresses, steam, ibuprofen, nasal decongestant.  This morning feels not as irritated.      Just left side.  Right under ear.  Sometimes ear ramses fells like pressure.  Been over a month.  No injury at beginning.  NO illness at onset.  Can't feel it when she touches from outside, but notices it when she swallows or moves her neck.    No weight loss, night fevers, or other concerning symptoms.  Went to  and doc there told her it could be cancer; scared her.  Would like further eval.    Problem list and histories reviewed & adjusted, as indicated.  Additional history: as documented    Reviewed and updated as needed this visit by clinical staff  Tobacco  Allergies  Meds  Med Hx  Surg Hx  Fam Hx  Soc Hx      Reviewed and updated as needed this visit by Provider         ROS:  Constitutional, HEENT, cardiovascular, pulmonary, gi and gu systems are negative, except as otherwise noted.    OBJECTIVE:     /66 (Cuff Size: Adult Regular)  Pulse 105  Temp 98.6  F (37  C) (Tympanic)  Wt 146 lb (66.2 kg)  LMP 04/13/2018  SpO2 98%  BMI 22.87 kg/m2  Body mass index is 22.87 kg/(m^2).  GENERAL: healthy, alert and no distress  EYES: Eyes grossly normal to inspection, PERRL and conjunctivae and sclerae normal  HENT: ear canals and TM's normal, nose and mouth without ulcers or  lesions  NECK: left sided tenderness just below ear and mandible on left side, slight enlargement palpable on left side compared to right. Otherwise no adenopathy, no asymmetry, masses, or scars and thyroid normal to palpation  RESP: lungs clear to auscultation - no rales, rhonchi or wheezes  CV: regular rate and rhythm, normal S1 S2, no S3 or S4, no murmur, click or rub, no peripheral edema and peripheral pulses strong  ABDOMEN: soft, nontender, no hepatosplenomegaly, no masses and bowel sounds normal  MS: no gross musculoskeletal defects noted, no edema    ASSESSMENT/PLAN:       ICD-10-CM    1. Painful swallowing R13.10 OTOLARYNGOLOGY REFERRAL   2. Mass of left side of neck R22.1 OTOLARYNGOLOGY REFERRAL     Discussed getting imaging of neck at this point as symptoms have not resolved in last month and are unilateral, versus seeing ENT first.  She elected to see ENT initially as symptoms bothersome.  Referred.  Follow up if symptoms worsen or don't improve, or other issues.    Kristan Bailey MD  [facility]

## 2018-05-01 NOTE — MR AVS SNAPSHOT
After Visit Summary   5/1/2018    Deb Yepez    MRN: [id number]           Patient Information     Date Of Birth          1985        Visit Information        Provider Department      5/1/2018 3:20 PM Kristan Bailey MD [facility]        Today's Diagnoses     Painful swallowing    -  1    Mass of left side of neck           Follow-ups after your visit        Additional Services     OTOLARYNGOLOGY REFERRAL       Your provider has referred you to: [facility]: [facility] [phone]   [email or web address]  [place] [phone]   [email or web address]    Please be aware that coverage of these services is subject to the terms and limitations of your health insurance plan.  Call member services at your health plan with any benefit or coverage questions.      Please bring the following with you to your appointment:    (1) Any X-Rays, CTs or MRIs which have been performed.  Contact the facility where they were done to arrange for  prior to your scheduled appointment.   (2) List of current medications  (3) This referral request   (4) Any documents/labs given to you for this referral                  Who to contact     If you have questions or need follow up information about today's clinic visit or your schedule please contact [facility] directly at [phone].  Normal or non-critical lab and imaging results will be communicated to you by MyChart, letter or phone within 4 business days after the clinic has received the results. If you do not hear from us within 7 days, please contact the clinic through MyChart or phone. If you have a critical or abnormal lab result, we will notify you by phone as soon as possible.  Submit refill requests through [employer] or call your pharmacy and they will forward the refill  request to us. Please allow 3 business days for your refill to be completed.          Additional Information About Your Visit        MyChart Information     [employer] gives you secure access to your electronic health record. If you see a primary care provider, you can also send messages to your care team and make appointments. If you have questions, please call your primary care clinic.  If you do not have a primary care provider, please call [phone] and they will assist you.        Care EveryWhere ID     This is your Care EveryWhere ID. This could be used by other organizations to access your [place] medical records  [id number]        Your Vitals Were     Pulse Temperature Last Period Pulse Oximetry BMI (Body Mass Index)       105 98.6  F (37  C) (Tympanic) 04/13/2018 98% 22.87 kg/m2        Blood Pressure from Last 3 Encounters:   05/01/18 120/66   04/10/18 122/72   12/08/17 105/62    Weight from Last 3 Encounters:   05/01/18 146 lb (66.2 kg)   04/10/18 146 lb (66.2 kg)   12/08/17 147 lb (66.7 kg)              We Performed the Following     OTOLARYNGOLOGY REFERRAL        Primary Care Provider Office Phone # Fax #    Kristan Bailey [phone] [address] [phone]        Equal Access to Services     TERI CARTER AH: Hadii evie ku hadasho Soomaali, waaxda luqadaha, qaybta kaalmada adeegyada, waxay idiin hayолегn rayna banuelos. So [facility] [phone].    ATENCIÓN: Si habla español, tiene a hobson disposición servicios gratuitos de asistencia lingüística. Llame al [phone].    We comply with applicable federal civil rights laws and [place] laws. We do not discriminate on the basis of race, color, national origin, age, disability, sex, sexual orientation, or gender identity.            Thank you!     Thank you for choosing [facility]  for your care. Our goal is always to provide you with excellent care. Hearing back from our patients  is one way we can continue to improve our services. Please take a few minutes to complete the written survey that you may receive in the mail after your visit with us. Thank you!             Your Updated Medication List - Protect others around you: Learn how to safely use, store and throw away your medicines at www.disposemymeds.org.          This list is accurate as of 5/1/18  3:53 PM.  Always use your most recent med list.                   Brand Name Dispense Instructions for use Diagnosis    MULTIVITAMIN PO      Take  by mouth daily.        norethindrone 0.35 MG per tablet    MICRONOR    90 tablet    Take 1 tablet (0.35 mg) by mouth daily    Encounter for surveillance of contraceptive pills       SUMAtriptan 50 MG tablet    IMITREX    9 tablet    Take 1 tablet (50 mg) by mouth at onset of headache for migraine May repeat dose in 2 hours.  Do not exceed 200 mg in 24 hours    Migraine aura without headache

## 2018-05-01 NOTE — NURSING NOTE
"Chief Complaint   Patient presents with     Throat Problem       Initial /66 (Cuff Size: Adult Regular)  Pulse 105  Temp 98.6  F (37  C) (Tympanic)  Wt 146 lb (66.2 kg)  LMP 04/13/2018  SpO2 98%  BMI 22.87 kg/m2 Estimated body mass index is 22.87 kg/(m^2) as calculated from the following:    Height as of 4/10/18: 5' 7\" (1.702 m).    Weight as of this encounter: 146 lb (66.2 kg).  Medication Reconciliation: complete   .Jairo BROWN      "

## 2018-05-03 ENCOUNTER — TRANSFERRED RECORDS (OUTPATIENT)
Dept: HEALTH INFORMATION MANAGEMENT | Facility: CLINIC | Age: 33
End: 2018-05-03

## 2018-05-16 ENCOUNTER — E-VISIT (OUTPATIENT)
Dept: FAMILY MEDICINE | Facility: CLINIC | Age: 33
End: 2018-05-16
Payer: COMMERCIAL

## 2018-05-16 DIAGNOSIS — R22.1 LOCALIZED SWELLING, MASS AND LUMP, NECK: Primary | ICD-10-CM

## 2018-05-16 PROCEDURE — 99444 ZZC PHYSICIAN ONLINE EVALUATION & MANAGEMENT SERVICE: CPT | Performed by: FAMILY MEDICINE

## 2018-05-18 ENCOUNTER — HOSPITAL ENCOUNTER (OUTPATIENT)
Dept: ULTRASOUND IMAGING | Facility: CLINIC | Age: 33
Discharge: HOME | End: 2018-05-18
Attending: FAMILY MEDICINE | Admitting: FAMILY MEDICINE
Payer: COMMERCIAL

## 2018-05-18 DIAGNOSIS — R22.1 LOCALIZED SWELLING, MASS AND LUMP, NECK: ICD-10-CM

## 2018-05-18 PROCEDURE — 76536 US EXAM OF HEAD AND NECK: CPT

## 2018-05-22 NOTE — PROGRESS NOTES
Good news, Deb.  Your results are normal.  Let me know if you have any questions.  Dr. Kristan Bailey MD  [facility]  [phone]

## 2018-09-14 NOTE — NURSING NOTE
"Chief Complaint   Patient presents with     Physical       Initial /72 (Cuff Size: Adult Regular)  Pulse 112  Ht 5' 7\" (1.702 m)  Wt 146 lb (66.2 kg)  LMP 03/16/2018 (Approximate)  SpO2 99%  BMI 22.87 kg/m2 Estimated body mass index is 22.87 kg/(m^2) as calculated from the following:    Height as of this encounter: 5' 7\" (1.702 m).    Weight as of this encounter: 146 lb (66.2 kg).  Medication Reconciliation: complete   .Jairo BROWN      "
no

## 2018-11-07 ENCOUNTER — OFFICE VISIT (OUTPATIENT)
Dept: FAMILY MEDICINE | Facility: CLINIC | Age: 33
End: 2018-11-07
Payer: COMMERCIAL

## 2018-11-07 VITALS
DIASTOLIC BLOOD PRESSURE: 62 MMHG | SYSTOLIC BLOOD PRESSURE: 110 MMHG | HEART RATE: 98 BPM | TEMPERATURE: 97.9 F | WEIGHT: 153.5 LBS | OXYGEN SATURATION: 100 % | BODY MASS INDEX: 24.04 KG/M2

## 2018-11-07 DIAGNOSIS — K29.00 ACUTE GASTRITIS WITHOUT HEMORRHAGE, UNSPECIFIED GASTRITIS TYPE: Primary | ICD-10-CM

## 2018-11-07 LAB
BASOPHILS # BLD AUTO: 0 10E9/L (ref 0–0.2)
BASOPHILS NFR BLD AUTO: 0.4 %
DIFFERENTIAL METHOD BLD: NORMAL
EOSINOPHIL # BLD AUTO: 0.1 10E9/L (ref 0–0.7)
EOSINOPHIL NFR BLD AUTO: 0.7 %
ERYTHROCYTE [DISTWIDTH] IN BLOOD BY AUTOMATED COUNT: 11.8 % (ref 10–15)
HCT VFR BLD AUTO: 41.2 % (ref 35–47)
HGB BLD-MCNC: 13.5 G/DL (ref 11.7–15.7)
LYMPHOCYTES # BLD AUTO: 2.6 10E9/L (ref 0.8–5.3)
LYMPHOCYTES NFR BLD AUTO: 32.1 %
MCH RBC QN AUTO: 31.6 PG (ref 26.5–33)
MCHC RBC AUTO-ENTMCNC: 32.8 G/DL (ref 31.5–36.5)
MCV RBC AUTO: 97 FL (ref 78–100)
MONOCYTES # BLD AUTO: 0.7 10E9/L (ref 0–1.3)
MONOCYTES NFR BLD AUTO: 9.1 %
NEUTROPHILS # BLD AUTO: 4.7 10E9/L (ref 1.6–8.3)
NEUTROPHILS NFR BLD AUTO: 57.7 %
PLATELET # BLD AUTO: 300 10E9/L (ref 150–450)
RBC # BLD AUTO: 4.27 10E12/L (ref 3.8–5.2)
WBC # BLD AUTO: 8.1 10E9/L (ref 4–11)

## 2018-11-07 PROCEDURE — 80053 COMPREHEN METABOLIC PANEL: CPT | Performed by: FAMILY MEDICINE

## 2018-11-07 PROCEDURE — 85025 COMPLETE CBC W/AUTO DIFF WBC: CPT | Performed by: FAMILY MEDICINE

## 2018-11-07 PROCEDURE — 83690 ASSAY OF LIPASE: CPT | Performed by: FAMILY MEDICINE

## 2018-11-07 PROCEDURE — 99214 OFFICE O/P EST MOD 30 MIN: CPT | Performed by: FAMILY MEDICINE

## 2018-11-07 PROCEDURE — 36415 COLL VENOUS BLD VENIPUNCTURE: CPT | Performed by: FAMILY MEDICINE

## 2018-11-07 RX ORDER — OMEPRAZOLE 40 MG/1
40 CAPSULE, DELAYED RELEASE ORAL DAILY
Qty: 30 CAPSULE | Refills: 1 | Status: SHIPPED | OUTPATIENT
Start: 2018-11-07 | End: 2019-01-25

## 2018-11-07 NOTE — MR AVS SNAPSHOT
After Visit Summary   11/7/2018    Deb Olivera    MRN: [id number]           Patient Information     Date Of Birth          1985        Visit Information        Provider Department      11/7/2018 4:40 PM Kristan Bailey MD [facility]        Today's Diagnoses     Acute gastritis without hemorrhage, unspecified gastritis type    -  1      Care Instructions      Gastritis or Ulcer, No Antibiotic Treatment    Gastritis is irritation and inflammation of the stomach lining. This means the lining is red and swollen. It can cause shallow sores in the stomach lining called erosions. An ulcer is a deeper open sore in the lining of the stomach. It may also occur in the first part of the small intestine (duodenum). The causes and symptoms of gastritis and ulcers are very similar.  Causes and risk factors for both problems can include:    Long-term use of nonsteroidal anti-inflammatory drugs (NSAIDs), such as aspirin and ibuprofen    H. pylori bacteria infection    Tobacco use    Alcohol use    Certain other conditions such as immune disorders, certain medicines (high-dose iron supplements) and street drugs (such as cocaine)  Symptoms for both problems can include:    Dull or burning pain in the upper part of the belly    Loss of appetite    Heartburn or upset stomach    Frequent burping    Bloated feeling    Nausea with or without vomiting  You likely had an evaluation to help find the exact cause and extent of your problem. This may have included a health history, exam, and certain tests.  Results showed that your problem is not due to H. pylori infection. For this reason, you don't need antibiotics as part of your treatment.  Whether your problem is gastritis or an ulcer, you will still need to take other medicines, however. You will also need to follow instructions to help reduce stomach irritation so your stomach can heal.   Home care    Take any medicines you re  prescribed exactly as directed. Common medicines used to treat gastritis include:  ? Antacids. These help neutralize the normal acids in your stomach.  ? Proton pump inhibitors. These block your stomach from making any acid.  ? H2 blockers. These reduce the amount of acid your stomach makes.  ? Bismuth subsalicylate. This helps protect the lining of your stomach from acid.    Don't take any NSAIDs during your treatment. If you take NSAID to help treat other health problems, tell your healthcare provider. He or she may need to adjust your medicine plan or change the dosage.    Don t use tobacco. Also don t drink alcohol. These products can increase the amount of acid your stomach makes. This can delay healing. It can also worsen symptoms.  Follow-up care  Follow up with your healthcare provider, or as advised. In some cases, more testing may be needed.  When to seek medical advice  Call your healthcare provider right away if any of these occur:    Fever of 100.4 F (38 C) or higher, or as directed by your healthcare provider    Stomach pain that worsens or moves to the lower right part of belly    Extreme fatigue    Weakness or dizziness    Continued weight loss    Frequent vomiting, blood in your vomit, or coffee ground-like substance in your vomit    Black, tarry, or bloody stools  Call 911  Call 911 if any of these occur:    Chest pain appears or worsens, or spreads to the back, neck, shoulder, or arm    Unusually fast heart rate    Trouble breathing or swallowing    Confusion    Extreme drowsiness or trouble waking up    Fainting    Large amounts of blood present in vomit or stool  Date Last Reviewed: 3/1/2018    4356-4571 [employer]. [address] [place] [address], [place] [address]. All rights reserved. This information is not intended as a substitute for professional medical care. Always follow your healthcare professional's instructions.                Follow-ups after your visit        Future tests  that were ordered for you today     Open Future Orders        Priority Expected Expires Ordered    H Pylori antigen, stool Routine  12/7/2018 11/7/2018            Who to contact     If you have questions or need follow up information about today's clinic visit or your schedule please contact [facility] directly at [phone].  Normal or non-critical lab and imaging results will be communicated to you by MyChart, letter or phone within 4 business days after the clinic has received the results. If you do not hear from us within 7 days, please contact the clinic through [employer] or phone. If you have a critical or abnormal lab result, we will notify you by phone as soon as possible.  Submit refill requests through [employer] or call your pharmacy and they will forward the refill request to us. Please allow 3 business days for your refill to be completed.          Additional Information About Your Visit        MyChart Information     [employer] gives you secure access to your electronic health record. If you see a primary care provider, you can also send messages to your care team and make appointments. If you have questions, please call your primary care clinic.  If you do not have a primary care provider, please call [phone] and they will assist you.        Care EveryWhere ID     This is your Care EveryWhere ID. This could be used by other organizations to access your [place] medical records  [id number]        Your Vitals Were     Pulse Temperature Last Period Pulse Oximetry BMI (Body Mass Index)       98 97.9  F (36.6  C) (Tympanic) 10/15/2018 (Approximate) 100% 24.04 kg/m2        Blood Pressure from Last 3 Encounters:   11/07/18 110/62   05/01/18 120/66   04/10/18 122/72    Weight from Last 3 Encounters:   11/07/18 153 lb 8 oz (69.6 kg)   05/01/18 146 lb (66.2 kg)   04/10/18 146 lb (66.2 kg)              We Performed the Following     CBC with platelets and differential      Comprehensive metabolic panel     Lipase          Today's Medication Changes          These changes are accurate as of 11/7/18  5:17 PM.  If you have any questions, ask your nurse or doctor.               Start taking these medicines.        Dose/Directions    omeprazole 40 MG capsule   Commonly known as:  priLOSEC   Used for:  Acute gastritis without hemorrhage, unspecified gastritis type   Started by:  Kristan Bailey MD        Dose:  40 mg   Take 1 capsule (40 mg) by mouth daily Take 30-60 minutes before a meal.   Quantity:  30 capsule   Refills:  1         Stop taking these medicines if you haven't already. Please contact your care team if you have questions.     SUMAtriptan 50 MG tablet   Commonly known as:  IMITREX   Stopped by:  Kristan Bailey MD                Where to get your medicines      These medications were sent to [facility]/pharmacy #[id number] - [place], [place] - [address] AT [facility]  [address] [place] [address]    Hours:  24-hours Phone:  [phone]     omeprazole 40 MG capsule                Primary Care Provider Office Phone # Fax #    Kristan Bailey [phone]       [address] [place] [address]        Equal Access to Services     [facility] AH: Hadii evie ku hadasho Soazael, waaxda luqadaha, qaybta kaalmada adeegyada, ene shipman . So [facility] [phone].    ATENCIÓN: Si habla español, tiene a hobson disposición servicios gratuitos de asistencia lingüística. Llame al [phone].    We comply with applicable federal civil rights laws and [place] laws. We do not discriminate on the basis of race, color, national origin, age, disability, sex, sexual orientation, or gender identity.            Thank you!     Thank you for choosing [facility]  for your care. Our goal is always to provide you with excellent care. Hearing back from our patients is one way we can continue to  improve our services. Please take a few minutes to complete the written survey that you may receive in the mail after your visit with us. Thank you!             Your Updated Medication List - Protect others around you: Learn how to safely use, store and throw away your medicines at www.disposemymeds.org.          This list is accurate as of 11/7/18  5:17 PM.  Always use your most recent med list.                   Brand Name Dispense Instructions for use Diagnosis    MULTIVITAMIN PO      Take  by mouth daily.        norethindrone 0.35 MG per tablet    MICRONOR    90 tablet    Take 1 tablet (0.35 mg) by mouth daily    Encounter for surveillance of contraceptive pills       omeprazole 40 MG capsule    priLOSEC    30 capsule    Take 1 capsule (40 mg) by mouth daily Take 30-60 minutes before a meal.    Acute gastritis without hemorrhage, unspecified gastritis type

## 2018-11-07 NOTE — PATIENT INSTRUCTIONS
Gastritis or Ulcer, No Antibiotic Treatment    Gastritis is irritation and inflammation of the stomach lining. This means the lining is red and swollen. It can cause shallow sores in the stomach lining called erosions. An ulcer is a deeper open sore in the lining of the stomach. It may also occur in the first part of the small intestine (duodenum). The causes and symptoms of gastritis and ulcers are very similar.  Causes and risk factors for both problems can include:    Long-term use of nonsteroidal anti-inflammatory drugs (NSAIDs), such as aspirin and ibuprofen    H. pylori bacteria infection    Tobacco use    Alcohol use    Certain other conditions such as immune disorders, certain medicines (high-dose iron supplements) and street drugs (such as cocaine)  Symptoms for both problems can include:    Dull or burning pain in the upper part of the belly    Loss of appetite    Heartburn or upset stomach    Frequent burping    Bloated feeling    Nausea with or without vomiting  You likely had an evaluation to help find the exact cause and extent of your problem. This may have included a health history, exam, and certain tests.  Results showed that your problem is not due to H. pylori infection. For this reason, you don't need antibiotics as part of your treatment.  Whether your problem is gastritis or an ulcer, you will still need to take other medicines, however. You will also need to follow instructions to help reduce stomach irritation so your stomach can heal.   Home care    Take any medicines you re prescribed exactly as directed. Common medicines used to treat gastritis include:  ? Antacids. These help neutralize the normal acids in your stomach.  ? Proton pump inhibitors. These block your stomach from making any acid.  ? H2 blockers. These reduce the amount of acid your stomach makes.  ? Bismuth subsalicylate. This helps protect the lining of your stomach from acid.    Don't take any NSAIDs during your  treatment. If you take NSAID to help treat other health problems, tell your healthcare provider. He or she may need to adjust your medicine plan or change the dosage.    Don t use tobacco. Also don t drink alcohol. These products can increase the amount of acid your stomach makes. This can delay healing. It can also worsen symptoms.  Follow-up care  Follow up with your healthcare provider, or as advised. In some cases, more testing may be needed.  When to seek medical advice  Call your healthcare provider right away if any of these occur:    Fever of 100.4 F (38 C) or higher, or as directed by your healthcare provider    Stomach pain that worsens or moves to the lower right part of belly    Extreme fatigue    Weakness or dizziness    Continued weight loss    Frequent vomiting, blood in your vomit, or coffee ground-like substance in your vomit    Black, tarry, or bloody stools  Call 911  Call 911 if any of these occur:    Chest pain appears or worsens, or spreads to the back, neck, shoulder, or arm    Unusually fast heart rate    Trouble breathing or swallowing    Confusion    Extreme drowsiness or trouble waking up    Fainting    Large amounts of blood present in vomit or stool  Date Last Reviewed: 3/1/2018    9255-5400 [employer]. [address] [place] [address], [place] [address]. All rights reserved. This information is not intended as a substitute for professional medical care. Always follow your healthcare professional's instructions.

## 2018-11-07 NOTE — LETTER
November 9, 2018      Deb lOivera  [address] [place] [address]        Dear Deb,     Good news, your tests are normal.     How are you feeling on the omeprazole?     Sincerely,     Dr. Kristan Bailey MD/[facility]     Thank you for choosing the [facility] as your health care provider. Please don't hesitate to call with any questions or concerns [phone].     Resulted Orders   CBC with platelets and differential   Result Value Ref Range    WBC 8.1 4.0 - 11.0 10e9/L    RBC Count 4.27 3.8 - 5.2 10e12/L    Hemoglobin 13.5 11.7 - 15.7 g/dL    Hematocrit 41.2 35.0 - 47.0 %    MCV 97 78 - 100 fl    MCH 31.6 26.5 - 33.0 pg    MCHC 32.8 31.5 - 36.5 g/dL    RDW 11.8 10.0 - 15.0 %    Platelet Count 300 150 - 450 10e9/L    % Neutrophils 57.7 %    % Lymphocytes 32.1 %    % Monocytes 9.1 %    % Eosinophils 0.7 %    % Basophils 0.4 %    Absolute Neutrophil 4.7 1.6 - 8.3 10e9/L    Absolute Lymphocytes 2.6 0.8 - 5.3 10e9/L    Absolute Monocytes 0.7 0.0 - 1.3 10e9/L    Absolute Eosinophils 0.1 0.0 - 0.7 10e9/L    Absolute Basophils 0.0 0.0 - 0.2 10e9/L    Diff Method Automated Method    Comprehensive metabolic panel   Result Value Ref Range    Sodium 138 133 - 144 mmol/L    Potassium 3.7 3.4 - 5.3 mmol/L    Chloride 104 94 - 109 mmol/L    Carbon Dioxide 24 20 - 32 mmol/L    Anion Gap 10 3 - 14 mmol/L    Glucose 91 70 - 99 mg/dL      Comment:      Non Fasting    Urea Nitrogen 7 7 - 30 mg/dL    Creatinine 0.66 0.52 - 1.04 mg/dL    GFR Estimate >90 >60 mL/min/1.7m2      Comment:      Non  GFR Calc    GFR Estimate If Black >90 >60 mL/min/1.7m2      Comment:       GFR Calc    Calcium 9.1 8.5 - 10.1 mg/dL    Bilirubin Total 0.7 0.2 - 1.3 mg/dL    Albumin 4.3 3.4 - 5.0 g/dL    Protein Total 8.0 6.8 - 8.8 g/dL    Alkaline Phosphatase 40 40 - 150 U/L    ALT 20 0 - 50 U/L    AST 22 0 - 45 U/L   Lipase   Result Value Ref Range     Lipase 96 73 - 393 U/L

## 2018-11-07 NOTE — PROGRESS NOTES
SUBJECTIVE:   Deb Olivera is a 32 year old female who presents to clinic today for the following health issues:      Abdominal Pain      Duration: 3 weeks    Description (location/character/radiation): upper left side abdominal       Associated flank pain: None    Intensity:  moderate    Accompanying signs and symptoms:        Fever/Chills: no        Gas/Bloating: YES       Nausea/vomitting: YES       Diarrhea: no        Dysuria or Hematuria: no     History (previous similar pain/trauma/previous testing):     Precipitating or alleviating factors:       Pain worse with eating/BM/urination: no       Pain relieved by BM: no     Therapies tried and outcome: None    LMP:  10-15-18    32-year-old female well-known to me previously healthy other than some occasional migraines and generalized anxiety disorder which she manages through therapy.  Here today for 3 weeks of central and left-sided upper abdominal pain.  She describes the pain as a dull ache.  She describes it as not sharp.  She feels like it is almost like indigestion.  She has tried over-the-counter Tums and Gas-X without improvement.  She has tried to stay away from spicy food.  She denies any vomiting.  She does note nausea.  She also notes loss of appetite.  She feels the symptoms are always present but wax and wane.  She has been under more stress lately.  She did travel in July to the [place] for her [employer].  She denies any sick contacts at home or among her coworkers.  She denies any blood or black stool.      Problem list and histories reviewed & adjusted, as indicated.  Additional history: as documented    Reviewed and updated as needed this visit by clinical staff       Reviewed and updated as needed this visit by Provider         ROS:  Constitutional, HEENT, cardiovascular, pulmonary, gi and gu systems are negative, except as otherwise noted.    OBJECTIVE:     /62 (Cuff Size: Adult Regular)  Pulse 98  Temp 97.9  F (36.6  C)  (Tympanic)  Wt 153 lb 8 oz (69.6 kg)  LMP 10/15/2018 (Approximate)  SpO2 100%  BMI 24.04 kg/m2  Body mass index is 24.04 kg/(m^2).  GENERAL: healthy, alert and no distress  NECK: no adenopathy, no asymmetry, masses, or scars and thyroid normal to palpation  RESP: lungs clear to auscultation - no rales, rhonchi or wheezes  CV: regular rate and rhythm, normal S1 S2, no S3 or S4, no murmur, click or rub, no peripheral edema and peripheral pulses strong  ABDOMEN: tenderness epigastric, no organomegaly or masses, liver span normal to percussion and bowel sounds normal  MS: no gross musculoskeletal defects noted, no edema    ASSESSMENT/PLAN:       ICD-10-CM    1. Acute gastritis without hemorrhage, unspecified gastritis type K29.00 CBC with platelets and differential     Comprehensive metabolic panel     H Pylori antigen, stool     Lipase     omeprazole (PRILOSEC) 40 MG capsule     Plan:    Suspect gastritis, but could be H Pylori or Ulcer or liver or pancreatic issue.    Will do baseline lab tests above.    Stop alcohol, coffee, caffeine, spicy food.    If labs positive, will treat/investigate as indicated.    If all negative, treat for gastritis with omeprazole 40 mg x 2-4 weeks.    Follow up with me in 2-4 weeks to see how she is doing.    If no improvement, consider upper endoscopy.    Patient Instructions     Gastritis or Ulcer, No Antibiotic Treatment    Gastritis is irritation and inflammation of the stomach lining. This means the lining is red and swollen. It can cause shallow sores in the stomach lining called erosions. An ulcer is a deeper open sore in the lining of the stomach. It may also occur in the first part of the small intestine (duodenum). The causes and symptoms of gastritis and ulcers are very similar.  Causes and risk factors for both problems can include:    Long-term use of nonsteroidal anti-inflammatory drugs (NSAIDs), such as aspirin and ibuprofen    H. pylori bacteria infection    Tobacco  use    Alcohol use    Certain other conditions such as immune disorders, certain medicines (high-dose iron supplements) and street drugs (such as cocaine)  Symptoms for both problems can include:    Dull or burning pain in the upper part of the belly    Loss of appetite    Heartburn or upset stomach    Frequent burping    Bloated feeling    Nausea with or without vomiting  You likely had an evaluation to help find the exact cause and extent of your problem. This may have included a health history, exam, and certain tests.  Results showed that your problem is not due to H. pylori infection. For this reason, you don't need antibiotics as part of your treatment.  Whether your problem is gastritis or an ulcer, you will still need to take other medicines, however. You will also need to follow instructions to help reduce stomach irritation so your stomach can heal.   Home care    Take any medicines you re prescribed exactly as directed. Common medicines used to treat gastritis include:  ? Antacids. These help neutralize the normal acids in your stomach.  ? Proton pump inhibitors. These block your stomach from making any acid.  ? H2 blockers. These reduce the amount of acid your stomach makes.  ? Bismuth subsalicylate. This helps protect the lining of your stomach from acid.    Don't take any NSAIDs during your treatment. If you take NSAID to help treat other health problems, tell your healthcare provider. He or she may need to adjust your medicine plan or change the dosage.    Don t use tobacco. Also don t drink alcohol. These products can increase the amount of acid your stomach makes. This can delay healing. It can also worsen symptoms.  Follow-up care  Follow up with your healthcare provider, or as advised. In some cases, more testing may be needed.  When to seek medical advice  Call your healthcare provider right away if any of these occur:    Fever of 100.4 F (38 C) or higher, or as directed by your  healthcare provider    Stomach pain that worsens or moves to the lower right part of belly    Extreme fatigue    Weakness or dizziness    Continued weight loss    Frequent vomiting, blood in your vomit, or coffee ground-like substance in your vomit    Black, tarry, or bloody stools  Call 911  Call 911 if any of these occur:    Chest pain appears or worsens, or spreads to the back, neck, shoulder, or arm    Unusually fast heart rate    Trouble breathing or swallowing    Confusion    Extreme drowsiness or trouble waking up    Fainting    Large amounts of blood present in vomit or stool  Date Last Reviewed: 3/1/2018    [id number] [employer]. [address] [place] [address]. All rights reserved. This information is not intended as a substitute for professional medical care. Always follow your healthcare professional's instructions.            Kristan Bailey MD  [facility]

## 2018-11-08 LAB — LIPASE SERPL-CCNC: 96 U/L (ref 73–393)

## 2018-11-09 LAB
ALBUMIN SERPL-MCNC: 4.3 G/DL (ref 3.4–5)
ALP SERPL-CCNC: 40 U/L (ref 40–150)
ALT SERPL W P-5'-P-CCNC: 20 U/L (ref 0–50)
ANION GAP SERPL CALCULATED.3IONS-SCNC: 10 MMOL/L (ref 3–14)
AST SERPL W P-5'-P-CCNC: 22 U/L (ref 0–45)
BILIRUB SERPL-MCNC: 0.7 MG/DL (ref 0.2–1.3)
BUN SERPL-MCNC: 7 MG/DL (ref 7–30)
CALCIUM SERPL-MCNC: 9.1 MG/DL (ref 8.5–10.1)
CHLORIDE SERPL-SCNC: 104 MMOL/L (ref 94–109)
CO2 SERPL-SCNC: 24 MMOL/L (ref 20–32)
CREAT SERPL-MCNC: 0.66 MG/DL (ref 0.52–1.04)
GFR SERPL CREATININE-BSD FRML MDRD: >90 ML/MIN/1.7M2
GLUCOSE SERPL-MCNC: 91 MG/DL (ref 70–99)
POTASSIUM SERPL-SCNC: 3.7 MMOL/L (ref 3.4–5.3)
PROT SERPL-MCNC: 8 G/DL (ref 6.8–8.8)
SODIUM SERPL-SCNC: 138 MMOL/L (ref 133–144)

## 2018-11-09 NOTE — PROGRESS NOTES
Dear Deb,  Good news, your tests are normal.  How are you feeling on the omeprazole?  Sincerely,  Dr. Kristan Bailey MD/[facility]     Thank you for choosing the [facility] as your health care provider. Please don't hesitate to call with any questions or concerns [phone].

## 2018-11-13 DIAGNOSIS — K29.00 ACUTE GASTRITIS WITHOUT HEMORRHAGE, UNSPECIFIED GASTRITIS TYPE: ICD-10-CM

## 2018-11-13 PROCEDURE — 87338 HPYLORI STOOL AG IA: CPT | Performed by: FAMILY MEDICINE

## 2018-11-14 LAB
H PYLORI AG STL QL IA: NORMAL
SPECIMEN SOURCE: NORMAL

## 2018-11-16 NOTE — PROGRESS NOTES
Dear Deb,  These results are in an acceptable/normal range.  I recommend no change to your plan of care - please let me know if you have any questions or concerns.  Sincerely,  Dr. Kristan Bailey MD  [facility]  [phone]

## 2018-11-28 ENCOUNTER — E-VISIT (OUTPATIENT)
Dept: FAMILY MEDICINE | Facility: CLINIC | Age: 33
End: 2018-11-28
Payer: COMMERCIAL

## 2018-11-28 DIAGNOSIS — R10.11 RUQ ABDOMINAL PAIN: ICD-10-CM

## 2018-11-28 DIAGNOSIS — K29.00 ACUTE GASTRITIS WITHOUT HEMORRHAGE, UNSPECIFIED GASTRITIS TYPE: ICD-10-CM

## 2018-11-28 DIAGNOSIS — R10.13 DYSPEPSIA: Primary | ICD-10-CM

## 2018-11-28 PROCEDURE — 99444 ZZC PHYSICIAN ONLINE EVALUATION & MANAGEMENT SERVICE: CPT | Performed by: FAMILY MEDICINE

## 2018-12-03 ENCOUNTER — HOSPITAL ENCOUNTER (OUTPATIENT)
Dept: ULTRASOUND IMAGING | Facility: CLINIC | Age: 33
Discharge: HOME | End: 2018-12-03
Attending: FAMILY MEDICINE | Admitting: FAMILY MEDICINE
Payer: COMMERCIAL

## 2018-12-03 DIAGNOSIS — K29.00 ACUTE GASTRITIS WITHOUT HEMORRHAGE, UNSPECIFIED GASTRITIS TYPE: ICD-10-CM

## 2018-12-03 DIAGNOSIS — R10.11 RUQ ABDOMINAL PAIN: ICD-10-CM

## 2018-12-03 DIAGNOSIS — R10.13 DYSPEPSIA: ICD-10-CM

## 2018-12-03 PROCEDURE — 76705 ECHO EXAM OF ABDOMEN: CPT

## 2018-12-03 NOTE — PROGRESS NOTES
Good news Deb!  Your ultrasound is normal and doesn't show any issues with your gallbladder, liver or pancreas.  Let me know if you have any questions about this.  Dr. Kristan Bailey MD  [facility]  [phone]

## 2018-12-06 DIAGNOSIS — Z30.41 ENCOUNTER FOR SURVEILLANCE OF CONTRACEPTIVE PILLS: ICD-10-CM

## 2018-12-06 NOTE — TELEPHONE ENCOUNTER
"Requested Prescriptions   Pending Prescriptions Disp Refills     JEANNINE 0.35 MG tablet [Pharmacy Med Name: JEANNINE 0.35 MG TABLET]  Last Written Prescription Date:  4/4/2018  Last Fill Quantity: 90 tablet,  # refills: 2   Last office visit: 11/7/2018 with prescribing provider:  TIKI Bailey   Future Office Visit:   Next 5 appointments (look out 90 days)     Dec 11, 2018  1:40 PM CANDE Goode with Kristan Bailey MD   [facility]    [facility] [id number] [phone]                  84 tablet 2     Sig: TAKE 1 TABLET (0.35 MG) BY MOUTH DAILY    Contraceptives Protocol Passed    12/6/2018  2:25 AM       Passed - Patient is not a current smoker if age is 35 or older       Passed - Recent (12 mo) or future (30 days) visit within the authorizing provider's specialty    Patient had office visit in the last 12 months or has a visit in the next 30 days with authorizing provider or within the authorizing provider's specialty.  See \"Patient Info\" tab in inbasket, or \"Choose Columns\" in Meds & Orders section of the refill encounter.             Passed - No active pregnancy on record       Passed - No positive pregnancy test in past 12 months           "

## 2018-12-07 RX ORDER — NORETHINDRONE 0.35 MG/1
TABLET ORAL
Qty: 84 TABLET | Refills: 0 | Status: SHIPPED | OUTPATIENT
Start: 2018-12-07 | End: 2019-01-25

## 2018-12-31 ENCOUNTER — TELEPHONE (OUTPATIENT)
Dept: GASTROENTEROLOGY | Facility: CLINIC | Age: 33
End: 2018-12-31

## 2018-12-31 NOTE — TELEPHONE ENCOUNTER
Additional Information regarding appointment: Voice mail message  Patient scheduled for:  Upper Endosopy  Indication for procedure: dyspepsia;   Acute gastritis without hemorrhage, unspecified gastritis type [K29.00]      IDate/Arrival time: 14:30   Procedure Provider:  Dr. Odell  Referring Provider. Solange Bailey  Prep Type:   []Golytely eRx: (not sent)  [x]NPO 6 hours prior to exam time Facility location:    [x][address], [address]     Anticoagulants or blood thinners: no

## 2019-01-07 ENCOUNTER — HOSPITAL ENCOUNTER (OUTPATIENT)
Facility: CLINIC | Age: 34
Discharge: HOME | End: 2019-01-07
Attending: INTERNAL MEDICINE | Admitting: INTERNAL MEDICINE
Payer: COMMERCIAL

## 2019-01-07 VITALS
RESPIRATION RATE: 27 BRPM | HEART RATE: 90 BPM | SYSTOLIC BLOOD PRESSURE: 117 MMHG | DIASTOLIC BLOOD PRESSURE: 73 MMHG | OXYGEN SATURATION: 97 %

## 2019-01-07 LAB — UPPER GI ENDOSCOPY: NORMAL

## 2019-01-07 PROCEDURE — 40000104 ZZH STATISTIC MODERATE SEDATION < 10 MIN: Performed by: INTERNAL MEDICINE

## 2019-01-07 PROCEDURE — 25000125 ZZHC RX 250: Performed by: INTERNAL MEDICINE

## 2019-01-07 PROCEDURE — 43235 EGD DIAGNOSTIC BRUSH WASH: CPT | Performed by: INTERNAL MEDICINE

## 2019-01-07 RX ORDER — ONDANSETRON 2 MG/ML
4 INJECTION INTRAMUSCULAR; INTRAVENOUS
Status: DISCONTINUED | OUTPATIENT
Start: 2019-01-07 | End: 2019-01-07 | Stop reason: HOSPADM

## 2019-01-07 RX ORDER — LIDOCAINE 40 MG/G
CREAM TOPICAL
Status: DISCONTINUED | OUTPATIENT
Start: 2019-01-07 | End: 2019-01-07 | Stop reason: HOSPADM

## 2019-01-07 NOTE — DISCHARGE INSTRUCTIONS
Discharge Instructions after Colonoscopy  or Sigmoidoscopy    Today you had a __x__ Colonoscopy __    Activity and Diet  You were given medicine for pain. You may be dizzy or sleepy.  For 24 hours:    Do not drive or use heavy equipment.    Do not make important decisions.    Do not drink any alcohol.  You may return to your normal diet and medicines.    Discomfort    Air was placed in your colon during the exam in order to see it. Walking helps to pass the air.    You may take Tylenol (acetaminophen) for pain unless your doctor has told you not to.  Do not take aspirin or ibuprofen (Advil, Motrin, or other anti-inflammatory  drugs) for __3___ days.    Follow-up  ___When to call:    Call right away if you have:    Unusual pain in belly or chest pain not relieved with passing air.    More than 1 to 2 Tablespoons of bleeding from your rectum.    Fever above 100.6  F (37.5  C).    If you have severe pain, bleeding, or shortness of breath, go to an emergency room.    If you have questions, call:  Monday to Friday, 7 a.m. to 4:30 p.m.  Endoscopy: [phone] (We may have to call you back)    After hours  Hospital: [phone] (Ask for the GI fellow on call)

## 2019-01-14 ENCOUNTER — E-VISIT (OUTPATIENT)
Dept: FAMILY MEDICINE | Facility: CLINIC | Age: 34
End: 2019-01-14
Payer: COMMERCIAL

## 2019-01-14 DIAGNOSIS — R10.9 ABDOMINAL PAIN, UNSPECIFIED ABDOMINAL LOCATION: Primary | ICD-10-CM

## 2019-01-14 PROCEDURE — 99207 ZZC NO CHARGE LOS: CPT | Performed by: FAMILY MEDICINE

## 2019-01-25 ENCOUNTER — OFFICE VISIT (OUTPATIENT)
Dept: FAMILY MEDICINE | Facility: CLINIC | Age: 34
End: 2019-01-25
Payer: COMMERCIAL

## 2019-01-25 VITALS
BODY MASS INDEX: 24.35 KG/M2 | TEMPERATURE: 98.1 F | HEART RATE: 89 BPM | DIASTOLIC BLOOD PRESSURE: 70 MMHG | SYSTOLIC BLOOD PRESSURE: 100 MMHG | WEIGHT: 155.5 LBS | OXYGEN SATURATION: 97 %

## 2019-01-25 DIAGNOSIS — R10.13 ABDOMINAL PAIN, EPIGASTRIC: Primary | ICD-10-CM

## 2019-01-25 PROBLEM — N63.0 LUMP OR MASS IN BREAST: Status: RESOLVED | Noted: 2018-04-10 | Resolved: 2019-01-25

## 2019-01-25 PROBLEM — K29.00 ACUTE GASTRITIS WITHOUT HEMORRHAGE, UNSPECIFIED GASTRITIS TYPE: Status: RESOLVED | Noted: 2018-11-07 | Resolved: 2019-01-25

## 2019-01-25 LAB — ERYTHROCYTE [SEDIMENTATION RATE] IN BLOOD BY WESTERGREN METHOD: 10 MM/H (ref 0–20)

## 2019-01-25 PROCEDURE — 99214 OFFICE O/P EST MOD 30 MIN: CPT | Performed by: FAMILY MEDICINE

## 2019-01-25 PROCEDURE — 86140 C-REACTIVE PROTEIN: CPT | Performed by: FAMILY MEDICINE

## 2019-01-25 PROCEDURE — 83690 ASSAY OF LIPASE: CPT | Performed by: FAMILY MEDICINE

## 2019-01-25 PROCEDURE — 85652 RBC SED RATE AUTOMATED: CPT | Performed by: FAMILY MEDICINE

## 2019-01-25 PROCEDURE — 36415 COLL VENOUS BLD VENIPUNCTURE: CPT | Performed by: FAMILY MEDICINE

## 2019-01-25 NOTE — PROGRESS NOTES
SUBJECTIVE:   Deb Olivera is a 33 year old female who presents to clinic today for the following health issues:      Abdominal Pain      Duration: oct 2018    Description (location/character/radiation): upper left under ribs       Associated flank pain: None    Intensity:  moderate    Accompanying signs and symptoms:        Fever/Chills: no        Gas/Bloating: no        Nausea/vomitting: YES       Diarrhea: no        Dysuria or Hematuria: no     History (previous similar pain/trauma/previous testing): upper endoscopy    Precipitating or alleviating factors:       Pain worse with eating/BM/urination: no       Pain relieved by BM: no     Therapies tried and outcome: omeprazole, pro biotic     LMP:  End December 2018    Pleasant 33-year-old female who is healthy and on no medications and has really no medical problems other than generalized anxiety disorder.  She is here today for abdominal pain that is ongoing since October.  Initially the pain was quite severe.  However it went over a couple days.  Then has waxed and waned since that time.  It is usually good in the morning but worsens as the day goes on.  Some days it is mild and some days it is quite uncomfortable and makes it difficult to focus at work.  It is not related to diet.  It is not related to diarrhea or constipation.  It does not feel like gas.  It is located in her left upper quadrant in a specific spot.  She has had a negative abdominal ultrasound.  She has had negative upper endoscopy.  She did do a 8-week trial of omeprazole which helped her indigestion but made no difference in her pain.  She is concerned there is something seriously wrong and wants further imaging today.  Symptoms do not worsen with exercise or abdominal exercise    Problem list and histories reviewed & adjusted, as indicated.  Additional history: as documented      Reviewed and updated as needed this visit by clinical staff       Reviewed and updated as needed this visit by  Provider         ROS:  Constitutional, HEENT, cardiovascular, pulmonary, gi and gu systems are negative, except as otherwise noted.    OBJECTIVE:     /70 (Cuff Size: Adult Regular)   Pulse 89   Temp 98.1  F (36.7  C) (Tympanic)   Wt 70.5 kg (155 lb 8 oz)   SpO2 97%   BMI 24.35 kg/m    Body mass index is 24.35 kg/m .  GENERAL: healthy, alert and no distress  RESP: lungs clear to auscultation - no rales, rhonchi or wheezes  CV: regular rate and rhythm, normal S1 S2, no S3 or S4, no murmur, click or rub, no peripheral edema and peripheral pulses strong  ABDOMEN: tenderness LUQ, no organomegaly or masses, liver span normal to percussion and bowel sounds normal  MS: no gross musculoskeletal defects noted, no edema  SKIN: no suspicious lesions or rashes  NEURO: Normal strength and tone, mentation intact and speech normal  PSYCH: mentation appears normal, affect normal/bright    Diagnostic Test Results:  Results for orders placed or performed in visit on 01/25/19   Erythrocyte sedimentation rate auto   Result Value Ref Range    Sed Rate 10 0 - 20 mm/h       ASSESSMENT/PLAN:       ICD-10-CM    1. Abdominal pain, epigastric R10.13 MR Abdomen w/o & w Contrast     CRP, inflammation     Lipase     Erythrocyte sedimentation rate auto     Plan as below in patient instructions.  At this point with localized pain x 3 months and negative ultrasound and endoscopy will get imaging to rule out more serious cause.  If negative, consider nerve entrapment and referral to pain clinic?  Or refer to gastro for consult?    Discussed with patient, all questions answered, in agreement with this plan, will return or seek further care if not improving or worsening.      Patient Instructions   1. Please call to schedule your MRI: For [facility] or [facility] ([address]) Radiology call [phone]. For [facility] Radiology call [phone].  2. Let me know if you look up $$ and want to do ultrasound instead, I'll  change order.  3. If normal, let's consider abdominal nerve entrapment.  4. Perhaps see GI again, but for a consult rather than procedure?  5. Massage in area of pain?        Kristan Bailey MD  [facility]     Referred To Oculoplastics For Closure Text (Leave Blank If You Do Not Want): After obtaining clear surgical margins the patient was sent to oculoplastics for surgical repair.  The patient understands they will receive post-surgical care and follow-up from the referring physician's office.

## 2019-01-25 NOTE — PATIENT INSTRUCTIONS
1. Please call to schedule your MRI: For [facility] or [facility] ([address]) Radiology call [phone]. For [facility] Radiology call [phone].  2. Let me know if you look up $$ and want to do ultrasound instead, I'll change order.  3. If normal, let's consider abdominal nerve entrapment.  4. Perhaps see GI again, but for a consult rather than procedure?  5. Massage in area of pain?

## 2019-01-28 LAB
CRP SERPL-MCNC: <2.9 MG/L (ref 0–8)
LIPASE SERPL-CCNC: 147 U/L (ref 73–393)

## 2019-01-30 ENCOUNTER — MYC MEDICAL ADVICE (OUTPATIENT)
Dept: FAMILY MEDICINE | Facility: CLINIC | Age: 34
End: 2019-01-30

## 2019-01-30 NOTE — TELEPHONE ENCOUNTER
No, it's not.  We only worry about lipase if quite high.  This is normal even though higher than last check.  Can you let her know?  Dr. Kristan Bailey MD/[facility]

## 2019-01-30 NOTE — RESULT ENCOUNTER NOTE
Dear Deb,  I have reviewed your results, and they are all in an acceptable/normal range.  At this point I recommend no change to your plan of care - please let me know if you have any questions or concerns.  Sincerely,  Dr. Kristan Bailey MD    Thank you for choosing the [facility] as your health care provider. Please don't hesitate to call with any questions or concerns [phone].

## 2019-01-31 ENCOUNTER — HOSPITAL ENCOUNTER (OUTPATIENT)
Dept: MRI IMAGING | Facility: CLINIC | Age: 34
Discharge: HOME | End: 2019-01-31
Admitting: FAMILY MEDICINE
Payer: COMMERCIAL

## 2019-01-31 DIAGNOSIS — R10.13 ABDOMINAL PAIN, EPIGASTRIC: ICD-10-CM

## 2019-01-31 PROCEDURE — 25000132 ZZH RX MED GY IP 250 OP 250 PS 637: Performed by: FAMILY MEDICINE

## 2019-01-31 PROCEDURE — 25500064 ZZH RX 255 OP 636: Performed by: FAMILY MEDICINE

## 2019-01-31 PROCEDURE — 25800025 ZZH RX 258: Performed by: FAMILY MEDICINE

## 2019-01-31 PROCEDURE — A9585 GADOBUTROL INJECTION: HCPCS | Performed by: FAMILY MEDICINE

## 2019-01-31 PROCEDURE — 72197 MRI PELVIS W/O & W/DYE: CPT

## 2019-01-31 RX ORDER — GADOBUTROL 604.72 MG/ML
7 INJECTION INTRAVENOUS ONCE
Status: COMPLETED | OUTPATIENT
Start: 2019-01-31 | End: 2019-01-31

## 2019-01-31 RX ORDER — ACYCLOVIR 200 MG/1
30 CAPSULE ORAL ONCE
Status: COMPLETED | OUTPATIENT
Start: 2019-01-31 | End: 2019-01-31

## 2019-01-31 RX ADMIN — HYOSCYAMINE SULFATE 125 MCG: 0.12 TABLET ORAL at 09:08

## 2019-01-31 RX ADMIN — SODIUM CHLORIDE 30 ML: 9 INJECTION, SOLUTION INTRAMUSCULAR; INTRAVENOUS; SUBCUTANEOUS at 09:09

## 2019-01-31 RX ADMIN — GADOBUTROL 7 ML: 604.72 INJECTION INTRAVENOUS at 09:08

## 2019-02-01 NOTE — RESULT ENCOUNTER NOTE
Dear Deb,  I have reviewed your results, and they are all in an acceptable/normal range.  At this point I recommend no change to your plan of care - please let me know if you have any questions or concerns.  Sincerely,  Dr. Kristan Bailey MD    Thank you for choosing the [facility] as your health care provider. Please don't hesitate to call with any questions or concerns [phone].

## 2019-07-31 ENCOUNTER — OFFICE VISIT (OUTPATIENT)
Dept: FAMILY MEDICINE | Facility: CLINIC | Age: 34
End: 2019-07-31
Payer: COMMERCIAL

## 2019-07-31 VITALS
DIASTOLIC BLOOD PRESSURE: 74 MMHG | HEART RATE: 106 BPM | SYSTOLIC BLOOD PRESSURE: 110 MMHG | OXYGEN SATURATION: 99 % | WEIGHT: 158 LBS | BODY MASS INDEX: 24.75 KG/M2 | TEMPERATURE: 98.7 F

## 2019-07-31 DIAGNOSIS — N92.6 MISSED PERIOD: Primary | ICD-10-CM

## 2019-07-31 DIAGNOSIS — Z32.01 PREGNANCY TEST POSITIVE: ICD-10-CM

## 2019-07-31 LAB — HCG UR QL: POSITIVE

## 2019-07-31 PROCEDURE — 99213 OFFICE O/P EST LOW 20 MIN: CPT | Performed by: FAMILY MEDICINE

## 2019-07-31 PROCEDURE — 81025 URINE PREGNANCY TEST: CPT | Performed by: FAMILY MEDICINE

## 2019-07-31 NOTE — RESULT ENCOUNTER NOTE
Here are the results we discussed in clinic today.  Let me know if you have any questions or concerns.  Dr. Kristan Bailey MD/[facility]

## 2019-07-31 NOTE — PROGRESS NOTES
Subjective     Deb Olivera is a 33 year old female who presents to clinic today for the following health issues:    HPI   Pt took a pregnancy test on 7-28-19 positive, then 1 more on 28th that was negative  Then took 1 on 7-29-19 negative    Deb Olivera is a very pleasant 33 year old patient who is here today for confirmatory pregnancy test.  This would be patient's 1st pregnancy.  This is a planned and desired pregnancy.  She is feeling well and having no nausea or fatigue. .    Patient's LMP was Patient's last menstrual period was 06/22/2019 (approximate)..  Her CAROLINA is 3/28/19.  Her gestational age today is 5w4d.    Deb Olivera does not smoke.  She is taking prenatal vitamins.  She does have a cat.  She does not drink alcohol.   She drinks no caffeine.  She has had no bleeding or cramping.  She has the following additional concerns:    RESULTS:  Positive pregnancy test.    OBJECTIVE:  Blood pressure 110/74, pulse 106, temperature 98.7  F (37.1  C), temperature source Tympanic, weight 71.7 kg (158 lb), last menstrual period 06/22/2019, SpO2 99 %, not currently breastfeeding.  Vitals reviewed and normal as above.  General: Alert, pleasant, oriented, communicative in no acute distress.      ASSESSMENT/PLAN:      ICD-10-CM    1. Missed period N92.6 HCG qualitative urine - CSC and Range   2. Pregnancy test positive Z32.01 OB/GYN REFERRAL       Patient Instructions   Congratulations!      This makes your Estimated Due Date: 3/28/19.  This makes your gestational age today: 5weeks and 4days.      Your next step is to make an appointment for a first OB appointment with your OB provider.  I recommend the midwife group at [facility]:  [phone].  They see patients here at our clinic on Monday afternoons.        Please let us know if you have bleeding or spotting.  This does not always mean that you are having a miscarriage, but should always be evaluated.       New Nurse Line  Information   If you have a pregnancy related concern or problem, please feel free to call the nurse line.  The nurse line is answered 8:00 am - 6:00 pm Monday through Friday.  The nurse line is not answered when the clinic is closed or on holidays.  One of the registered nurses who are specialized in women s health care will take your call and address your questions.  Frequently, they will consult your caregiver and call you back.  We do not interrupt physicians or nurse-midwives while they are with patients unless it is an emergency.  Please try to keep your phone line open so that we can get back to you as soon as possible.  If the call involves a problem that may need a prescription for medication, please have your current pharmacy phone number ready when you call.  Prescriptions for prenatal vitamins are usually written with several refills so check with your pharmacy before calling.  Call between 8:00 am and 12 noon, and 1:00 pm and 6:00 pm when the clinic is open.  The direct nurse line phone number is [phone].  Please call with non-emergency calls during office hours when your medical chart is available for us to review.  It is difficult to individualize recommendations to you without your medical records.    AFTER HOURS CALLS  Our answering service can assist you when the clinic is closed, if you have an urgent need.  You can dial the clinic s main number at [phone] and you will be connected with the answering service.  They will ask for your name, phone number, what your concern is and then they will contact the physician or nurse-midwife on call.        And remember:    *Take your prenatal vitamin (or a 400-800 mcg folic acid supplement) every day!  *You need a flu shot if it is flu season while you are pregnant.    *You need a TDAP immunization between 27-36 weeks of pregnancy if you have not had one in the last 2 years to protect and and your baby from pertussis (whooping cough).  *No  ibuprofen, no cat litter, limit fish intake, no smoking and no alcohol.  *Limit caffeine to 0-1 cup a day.              Also:  *Discussed early first trimester pregnancy signs and symptoms.  *Discussed exercise, sex, other common first trimester pregnancy concerns.  *Discusssed options for pregnancy care including family doctors, midwives, OBs.  *Will make first OB appointment usually after 10-11 weeks.  *Discussed common signs and symptoms of miscarriage.  *Instructed on use of triage nurse line and contacting the on call MD after hours for an urgent need such as fever, vagina bleeding, bladder or vaginal infection, rupture of membranes,  or term labor.

## 2019-07-31 NOTE — PATIENT INSTRUCTIONS
Congratulations!      This makes your Estimated Due Date: 3/28/19.  This makes your gestational age today: 5weeks and 4days.      Your next step is to make an appointment for a first OB appointment with your OB provider.  I recommend the midwife group at [facility]:  [phone].  They see patients here at our clinic on Monday afternoons.        Please let us know if you have bleeding or spotting.  This does not always mean that you are having a miscarriage, but should always be evaluated.       New Nurse Line Information   If you have a pregnancy related concern or problem, please feel free to call the nurse line.  The nurse line is answered 8:00 am - 6:00 pm Monday through Friday.  The nurse line is not answered when the clinic is closed or on holidays.  One of the registered nurses who are specialized in women s health care will take your call and address your questions.  Frequently, they will consult your caregiver and call you back.  We do not interrupt physicians or nurse-midwives while they are with patients unless it is an emergency.  Please try to keep your phone line open so that we can get back to you as soon as possible.  If the call involves a problem that may need a prescription for medication, please have your current pharmacy phone number ready when you call.  Prescriptions for prenatal vitamins are usually written with several refills so check with your pharmacy before calling.  Call between 8:00 am and 12 noon, and 1:00 pm and 6:00 pm when the clinic is open.  The direct nurse line phone number is [phone].  Please call with non-emergency calls during office hours when your medical chart is available for us to review.  It is difficult to individualize recommendations to you without your medical records.    AFTER HOURS CALLS  Our answering service can assist you when the clinic is closed, if you have an urgent need.  You can dial the clinic s main  number at [phone] and you will be connected with the answering service.  They will ask for your name, phone number, what your concern is and then they will contact the physician or nurse-midwife on call.        And remember:    *Take your prenatal vitamin (or a 400-800 mcg folic acid supplement) every day!  *You need a flu shot if it is flu season while you are pregnant.    *You need a TDAP immunization between 27-36 weeks of pregnancy if you have not had one in the last 2 years to protect and and your baby from pertussis (whooping cough).  *No ibuprofen, no cat litter, limit fish intake, no smoking and no alcohol.  *Limit caffeine to 0-1 cup a day.

## 2019-08-03 ENCOUNTER — TRANSFERRED RECORDS (OUTPATIENT)
Dept: HEALTH INFORMATION MANAGEMENT | Facility: CLINIC | Age: 34
End: 2019-08-03

## 2019-08-05 ENCOUNTER — OFFICE VISIT (OUTPATIENT)
Dept: FAMILY MEDICINE | Facility: CLINIC | Age: 34
End: 2019-08-05
Payer: COMMERCIAL

## 2019-08-05 ENCOUNTER — TELEPHONE (OUTPATIENT)
Dept: FAMILY MEDICINE | Facility: CLINIC | Age: 34
End: 2019-08-05

## 2019-08-05 VITALS
WEIGHT: 158 LBS | BODY MASS INDEX: 24.75 KG/M2 | DIASTOLIC BLOOD PRESSURE: 70 MMHG | OXYGEN SATURATION: 100 % | RESPIRATION RATE: 16 BRPM | HEART RATE: 96 BPM | SYSTOLIC BLOOD PRESSURE: 120 MMHG

## 2019-08-05 DIAGNOSIS — O20.0 THREATENED ABORTION: Primary | ICD-10-CM

## 2019-08-05 LAB — B-HCG SERPL-ACNC: 1109 IU/L (ref 0–5)

## 2019-08-05 PROCEDURE — 99213 OFFICE O/P EST LOW 20 MIN: CPT | Performed by: PHYSICIAN ASSISTANT

## 2019-08-05 PROCEDURE — 84702 CHORIONIC GONADOTROPIN TEST: CPT | Performed by: PHYSICIAN ASSISTANT

## 2019-08-05 PROCEDURE — 36415 COLL VENOUS BLD VENIPUNCTURE: CPT | Performed by: PHYSICIAN ASSISTANT

## 2019-08-05 NOTE — TELEPHONE ENCOUNTER
Called patient re: lab results:    1) HCG received from , was 447 on 8/3/2019    2) HCG 1109 today, indicating healthy pregnancy at this time.    3) Suspect GA younger than originally thought. Recommend we recheck HCG on Wednesday, and if still progressing well, recheck dating US next week.    4) Report any pelvic/abd pain or bleeding/spotting in the meantime.

## 2019-08-05 NOTE — PROGRESS NOTES
Subjective     Deb Olivera is a 33 year old female who presents to clinic today for the following health issues:    HPI   ED/UC Followup:    Facility:  [facility]  Date of visit: 8/3/2019  Reason for visit: abdominal pain  Current Status: pt is to have blood test to see where HCG level is at     - Patient with confirmed pregnancy by urine HCG, CAROLINA 3/28/2020 based on LMP. GA 6w2d by LMP. Patient reports hx irregular cycles.  - Developed RLQ pain over the weekend, no bleeding. Called nurses line who told her to be seen at   - US showed yolk sac but no fetal pole or heartbeat; sac diameter < 5wk GA  - Quant HCG was still pending when she was discharged from   - No further abd pain, no bleeding or spotting, no increased discharge.    Patient Active Problem List   Diagnosis     Generalized anxiety disorder     Migraine aura without headache     Past Surgical History:   Procedure Laterality Date     ESOPHAGOSCOPY, GASTROSCOPY, DUODENOSCOPY (EGD), COMBINED N/A 1/7/2019    Procedure: COMBINED ESOPHAGOSCOPY, GASTROSCOPY, DUODENOSCOPY (EGD);  Surgeon: Vinay Childers MD;  Location:  GI       Social History     Tobacco Use     Smoking status: Former Smoker     Smokeless tobacco: Never Used   Substance Use Topics     Alcohol use: Yes     Comment: occ     Family History   Problem Relation Age of Onset     Diabetes Paternal Grandfather      Lipids Father          Current Outpatient Medications   Medication Sig Dispense Refill     Multiple Vitamin (MULTIVITAMIN OR) Take  by mouth daily.       Reviewed and updated as needed this visit by Provider         Review of Systems   ROS COMP: Constitutional, HEENT, cardiovascular, pulmonary, GI, , musculoskeletal, neuro, skin, endocrine and psych systems are negative, except as otherwise noted.      Objective    /70   Pulse 96   Resp 16   Wt 71.7 kg (158 lb)   SpO2 100%   BMI 24.75 kg/m    Body mass index is 24.75 kg/m .  Physical Exam   GENERAL:   WDWN, no acute distress  PSYCH: cooperative, tearful  EYES: no discharge, no injection  HENT:  Normocephalic. Moist mucus membranes.  NECK:  Supple, symmetric  EXTREMITIES:  No gross deformities, moves all 4 limbs spontaneously  SKIN:  Warm and dry, no rash or suspicious lesions    NEUROLOGIC: alert, sensation grossly intact.    Diagnostic Test Results:  none         Assessment & Plan       ICD-10-CM    1. Threatened  O20.0 HCG Quantitative Pregnancy, Blood (HQK680)     - Will obtain HCG results from  on 8/3/2019 and recheck today.  - Discussed implications of HCG level and possibility of early miscarriage, and potential management if this is confirmed  - Discussed possibility of very early pregnancy given unpredictable cycles, so may need another US next week pending HCG results.      No follow-ups on file.    Katarina Allen PA-C  [facility]

## 2019-08-07 ENCOUNTER — OFFICE VISIT (OUTPATIENT)
Dept: FAMILY MEDICINE | Facility: CLINIC | Age: 34
End: 2019-08-07
Payer: COMMERCIAL

## 2019-08-07 ENCOUNTER — TELEPHONE (OUTPATIENT)
Dept: FAMILY MEDICINE | Facility: CLINIC | Age: 34
End: 2019-08-07

## 2019-08-07 VITALS
HEART RATE: 104 BPM | OXYGEN SATURATION: 100 % | HEIGHT: 67 IN | SYSTOLIC BLOOD PRESSURE: 102 MMHG | WEIGHT: 158 LBS | RESPIRATION RATE: 16 BRPM | DIASTOLIC BLOOD PRESSURE: 56 MMHG | BODY MASS INDEX: 24.8 KG/M2

## 2019-08-07 DIAGNOSIS — O20.0 THREATENED ABORTION: ICD-10-CM

## 2019-08-07 DIAGNOSIS — O20.0 THREATENED ABORTION: Primary | ICD-10-CM

## 2019-08-07 DIAGNOSIS — Z32.01 PREGNANCY TEST POSITIVE: Primary | ICD-10-CM

## 2019-08-07 DIAGNOSIS — Z32.01 PREGNANCY TEST POSITIVE: ICD-10-CM

## 2019-08-07 LAB — B-HCG SERPL-ACNC: 3239 IU/L (ref 0–5)

## 2019-08-07 PROCEDURE — 84702 CHORIONIC GONADOTROPIN TEST: CPT | Performed by: PHYSICIAN ASSISTANT

## 2019-08-07 PROCEDURE — 36415 COLL VENOUS BLD VENIPUNCTURE: CPT | Performed by: PHYSICIAN ASSISTANT

## 2019-08-07 PROCEDURE — 99214 OFFICE O/P EST MOD 30 MIN: CPT | Performed by: PHYSICIAN ASSISTANT

## 2019-08-07 ASSESSMENT — MIFFLIN-ST. JEOR: SCORE: 1459.31

## 2019-08-07 NOTE — TELEPHONE ENCOUNTER
- Reported increasing HCG level to patient, consistent with likely normal pregnancy.  - Recommend she schedule repeat US next week to re-assess viability and dates. Order placed, patient will call to schedule  - Discussed sx that warrant recheck.

## 2019-08-07 NOTE — PROGRESS NOTES
Subjective     Deb Olivera is a 33 year old female who presents to clinic today for the following health issues:    HPI   Follow up      Duration:     Description (location/character/radiation): pt is here to recheck HCG level    Intensity:      Accompanying signs and symptoms:     History (similar episodes/previous evaluation): None    Precipitating or alleviating factors: None    Therapies tried and outcome: None     - Patient here for f/u threatened .  - Positive pregnancy test 2019. CAROLINA 3/28/2020 based on LMP.  - Seen at [facility] 8/3/2019 for RLQ pain. US showed yolk sac, but no fetal pole or heartbeat, sac diameter < 5wk GA.  - Patient GA at that time would have been 6w based on LMP.  - Patient seen on 2019 for f/u. Recheck HCG showed expected increase in setting of normal pregnancy (447 --> 1109).  - Missed  vs normal pregnancy remain on differential. Patient with irregular cycles so pregnancy may be younger than originally thought.  - Patient asx; no cramping, bleeding. Is feeling more bloated than usual.    Patient Active Problem List   Diagnosis     Generalized anxiety disorder     Migraine aura without headache     Past Surgical History:   Procedure Laterality Date     ESOPHAGOSCOPY, GASTROSCOPY, DUODENOSCOPY (EGD), COMBINED N/A 2019    Procedure: COMBINED ESOPHAGOSCOPY, GASTROSCOPY, DUODENOSCOPY (EGD);  Surgeon: Vinay Childers MD;  [facility]       Social History     Tobacco Use     Smoking status: Former Smoker     Smokeless tobacco: Never Used   Substance Use Topics     Alcohol use: Yes     Comment: occ     Family History   Problem Relation Age of Onset     Diabetes Paternal Grandfather      Lipids Father          Current Outpatient Medications   Medication Sig Dispense Refill     Multiple Vitamin (MULTIVITAMIN OR) Take  by mouth daily.         Reviewed and updated as needed this visit by Provider  Tobacco  Allergies  Meds  Problems  Med Hx  Surg Hx  Fam Hx      "    Review of Systems   ROS COMP: Constitutional, HEENT, cardiovascular, pulmonary, GI, , musculoskeletal, neuro, skin, endocrine and psych systems are negative, except as otherwise noted.      Objective    /56   Pulse 104   Resp 16   Ht 1.702 m (5' 7\")   Wt 71.7 kg (158 lb)   SpO2 100%   BMI 24.75 kg/m    Body mass index is 24.75 kg/m .  Physical Exam   GENERAL:  WDWN, no acute distress  PSYCH: pleasant, cooperative  EYES: no discharge, no injection  HENT:  Normocephalic. Moist mucus membranes.  NECK:  Supple, symmetric  EXTREMITIES:  No gross deformities, moves all 4 limbs spontaneously, no peripheral edema  SKIN:  Warm and dry, no rash or suspicious lesions    NEUROLOGIC: alert, sensation grossly intact.    Diagnostic Test Results:  Labs reviewed in Epic        Assessment & Plan       ICD-10-CM    1. Threatened  O20.0 HCG Quantitative Pregnancy, Blood (XBS564)   2. Pregnancy test positive Z32.01      - Will recheck HCG to ensure continues to trend upward.  - Reviewed possibility of missed  vs normal pregnancy  - If HCG decreased or flat, consider repeat US this week to confirm missed . Discussed management with Cytotec vs D&C.  - If HCG continues to rise, repeat US next week to evaluate further.    25 minutes total spent during this visit, >50% spent in counseling and coordination of patient care.    Return in about 1 week (around 2019).    Katarina Allen PA-C  [facility]      "

## 2019-08-14 ENCOUNTER — HOSPITAL ENCOUNTER (OUTPATIENT)
Dept: ULTRASOUND IMAGING | Facility: CLINIC | Age: 34
Discharge: HOME | End: 2019-08-14
Attending: PHYSICIAN ASSISTANT | Admitting: PHYSICIAN ASSISTANT
Payer: COMMERCIAL

## 2019-08-14 ENCOUNTER — TELEPHONE (OUTPATIENT)
Dept: FAMILY MEDICINE | Facility: CLINIC | Age: 34
End: 2019-08-14

## 2019-08-14 DIAGNOSIS — O20.0 THREATENED ABORTION: Primary | ICD-10-CM

## 2019-08-14 DIAGNOSIS — O20.0 THREATENED ABORTION: ICD-10-CM

## 2019-08-14 DIAGNOSIS — Z32.01 PREGNANCY TEST POSITIVE: ICD-10-CM

## 2019-08-14 LAB — RADIOLOGIST FLAGS: NORMAL

## 2019-08-14 PROCEDURE — 76801 OB US < 14 WKS SINGLE FETUS: CPT

## 2019-08-14 NOTE — TELEPHONE ENCOUNTER
Spoke with patient re: US results from today. Unclear if pregnancy is viable, recommend repeat US in 7-10 days. Ordered, patient will call to schedule.

## 2019-08-20 ENCOUNTER — TELEPHONE (OUTPATIENT)
Dept: FAMILY MEDICINE | Facility: CLINIC | Age: 34
End: 2019-08-20

## 2019-08-20 NOTE — TELEPHONE ENCOUNTER
Patient calling to request that ultrasound results from 8/14 be released to [facility].  Routing to provider to release.

## 2019-08-20 NOTE — TELEPHONE ENCOUNTER
I can't release them as far as I know because I didn't order - I'll send to [facility] to see if she can release.    I also called Deb and left a message seeing if there was anything I could help her with today.  LMK if she calls back and has other questions.    Dr. Kristan Bailey MD/[facility]

## 2019-08-26 ENCOUNTER — HOSPITAL ENCOUNTER (OUTPATIENT)
Dept: ULTRASOUND IMAGING | Facility: CLINIC | Age: 34
Discharge: HOME | End: 2019-08-26
Attending: PHYSICIAN ASSISTANT | Admitting: PHYSICIAN ASSISTANT
Payer: COMMERCIAL

## 2019-08-26 DIAGNOSIS — O20.0 THREATENED ABORTION: ICD-10-CM

## 2019-08-26 DIAGNOSIS — Z32.01 PREGNANCY TEST POSITIVE: ICD-10-CM

## 2019-08-26 PROCEDURE — 76801 OB US < 14 WKS SINGLE FETUS: CPT

## 2019-08-26 NOTE — RESULT ENCOUNTER NOTE
Thanh Boyd,    I just wanted to let you know that your results have been reviewed and are attached.    - Fantastic news -- we FINALLY saw a healthy, normal pregnancy! It is now measuring 7 weeks and 1 day, which puts your due date at 4/12/2020. It has a normal heart rate of 152 beats per minute. As we suspected, your baby was just a little younger than we originally thought. Congratulations! I recommend re-scheduling your first OB appointment in 2-3 weeks.    Please let me know if you have any questions.    Sincerely,    Katarina Allen PA-C    [place] [id number] [address] [address] [phone] (p)

## 2019-08-28 ASSESSMENT — ANXIETY QUESTIONNAIRES
7. FEELING AFRAID AS IF SOMETHING AWFUL MIGHT HAPPEN: NOT AT ALL
3. WORRYING TOO MUCH ABOUT DIFFERENT THINGS: NOT AT ALL
GAD7 TOTAL SCORE: 1
1. FEELING NERVOUS, ANXIOUS, OR ON EDGE: SEVERAL DAYS
6. BECOMING EASILY ANNOYED OR IRRITABLE: NOT AT ALL
2. NOT BEING ABLE TO STOP OR CONTROL WORRYING: NOT AT ALL
5. BEING SO RESTLESS THAT IT IS HARD TO SIT STILL: NOT AT ALL
4. TROUBLE RELAXING: NOT AT ALL
GAD7 TOTAL SCORE: 1
7. FEELING AFRAID AS IF SOMETHING AWFUL MIGHT HAPPEN: NOT AT ALL

## 2019-09-10 ENCOUNTER — ANCILLARY PROCEDURE (OUTPATIENT)
Dept: ULTRASOUND IMAGING | Facility: CLINIC | Age: 34
End: 2019-09-10
Attending: ADVANCED PRACTICE MIDWIFE
Payer: COMMERCIAL

## 2019-09-10 ENCOUNTER — OFFICE VISIT (OUTPATIENT)
Dept: OBGYN | Facility: CLINIC | Age: 34
End: 2019-09-10
Attending: ADVANCED PRACTICE MIDWIFE
Payer: COMMERCIAL

## 2019-09-10 VITALS
HEIGHT: 67 IN | BODY MASS INDEX: 24.42 KG/M2 | HEART RATE: 82 BPM | SYSTOLIC BLOOD PRESSURE: 102 MMHG | DIASTOLIC BLOOD PRESSURE: 67 MMHG | WEIGHT: 155.6 LBS

## 2019-09-10 DIAGNOSIS — Z87.42 HX OF ABNORMAL CERVICAL PAP SMEAR: ICD-10-CM

## 2019-09-10 DIAGNOSIS — Z34.91 ENCOUNTER FOR SUPERVISION OF NORMAL PREGNANCY IN FIRST TRIMESTER, UNSPECIFIED GRAVIDITY: ICD-10-CM

## 2019-09-10 DIAGNOSIS — Z34.01 ENCOUNTER FOR SUPERVISION OF NORMAL FIRST PREGNANCY IN FIRST TRIMESTER: Primary | ICD-10-CM

## 2019-09-10 DIAGNOSIS — Z23 NEED FOR PROPHYLACTIC VACCINATION AND INOCULATION AGAINST INFLUENZA: ICD-10-CM

## 2019-09-10 LAB
ABO + RH BLD: NORMAL
ABO + RH BLD: NORMAL
BASOPHILS # BLD AUTO: 0 10E9/L (ref 0–0.2)
BASOPHILS NFR BLD AUTO: 0.2 %
BLD GP AB SCN SERPL QL: NORMAL
BLOOD BANK CMNT PATIENT-IMP: NORMAL
DIFFERENTIAL METHOD BLD: NORMAL
EOSINOPHIL # BLD AUTO: 0.1 10E9/L (ref 0–0.7)
EOSINOPHIL NFR BLD AUTO: 0.5 %
ERYTHROCYTE [DISTWIDTH] IN BLOOD BY AUTOMATED COUNT: 11.4 % (ref 10–15)
HCT VFR BLD AUTO: 37.2 % (ref 35–47)
HGB BLD-MCNC: 12.7 G/DL (ref 11.7–15.7)
IMM GRANULOCYTES # BLD: 0 10E9/L (ref 0–0.4)
IMM GRANULOCYTES NFR BLD: 0.2 %
LYMPHOCYTES # BLD AUTO: 2.1 10E9/L (ref 0.8–5.3)
LYMPHOCYTES NFR BLD AUTO: 20.4 %
MCH RBC QN AUTO: 31 PG (ref 26.5–33)
MCHC RBC AUTO-ENTMCNC: 34.1 G/DL (ref 31.5–36.5)
MCV RBC AUTO: 91 FL (ref 78–100)
MONOCYTES # BLD AUTO: 0.7 10E9/L (ref 0–1.3)
MONOCYTES NFR BLD AUTO: 6.3 %
NEUTROPHILS # BLD AUTO: 7.5 10E9/L (ref 1.6–8.3)
NEUTROPHILS NFR BLD AUTO: 72.4 %
NRBC # BLD AUTO: 0 10*3/UL
NRBC BLD AUTO-RTO: 0 /100
PLATELET # BLD AUTO: 329 10E9/L (ref 150–450)
RBC # BLD AUTO: 4.1 10E12/L (ref 3.8–5.2)
SPECIMEN EXP DATE BLD: NORMAL
WBC # BLD AUTO: 10.4 10E9/L (ref 4–11)

## 2019-09-10 PROCEDURE — 25000128 H RX IP 250 OP 636: Mod: ZF

## 2019-09-10 PROCEDURE — 82306 VITAMIN D 25 HYDROXY: CPT | Performed by: ADVANCED PRACTICE MIDWIFE

## 2019-09-10 PROCEDURE — 86900 BLOOD TYPING SEROLOGIC ABO: CPT | Performed by: ADVANCED PRACTICE MIDWIFE

## 2019-09-10 PROCEDURE — 87389 HIV-1 AG W/HIV-1&-2 AB AG IA: CPT | Performed by: ADVANCED PRACTICE MIDWIFE

## 2019-09-10 PROCEDURE — G0008 ADMIN INFLUENZA VIRUS VAC: HCPCS | Mod: ZF

## 2019-09-10 PROCEDURE — 86850 RBC ANTIBODY SCREEN: CPT | Performed by: ADVANCED PRACTICE MIDWIFE

## 2019-09-10 PROCEDURE — 90686 IIV4 VACC NO PRSV 0.5 ML IM: CPT | Mod: ZF

## 2019-09-10 PROCEDURE — 86762 RUBELLA ANTIBODY: CPT | Performed by: ADVANCED PRACTICE MIDWIFE

## 2019-09-10 PROCEDURE — 36415 COLL VENOUS BLD VENIPUNCTURE: CPT | Performed by: ADVANCED PRACTICE MIDWIFE

## 2019-09-10 PROCEDURE — 86901 BLOOD TYPING SEROLOGIC RH(D): CPT | Performed by: ADVANCED PRACTICE MIDWIFE

## 2019-09-10 PROCEDURE — 87340 HEPATITIS B SURFACE AG IA: CPT | Performed by: ADVANCED PRACTICE MIDWIFE

## 2019-09-10 PROCEDURE — 76801 OB US < 14 WKS SINGLE FETUS: CPT

## 2019-09-10 PROCEDURE — 85025 COMPLETE CBC W/AUTO DIFF WBC: CPT | Performed by: ADVANCED PRACTICE MIDWIFE

## 2019-09-10 PROCEDURE — 86803 HEPATITIS C AB TEST: CPT | Performed by: ADVANCED PRACTICE MIDWIFE

## 2019-09-10 PROCEDURE — 87086 URINE CULTURE/COLONY COUNT: CPT | Performed by: ADVANCED PRACTICE MIDWIFE

## 2019-09-10 PROCEDURE — 86706 HEP B SURFACE ANTIBODY: CPT | Performed by: ADVANCED PRACTICE MIDWIFE

## 2019-09-10 PROCEDURE — 86780 TREPONEMA PALLIDUM: CPT | Performed by: ADVANCED PRACTICE MIDWIFE

## 2019-09-10 ASSESSMENT — ANXIETY QUESTIONNAIRES
3. WORRYING TOO MUCH ABOUT DIFFERENT THINGS: NOT AT ALL
5. BEING SO RESTLESS THAT IT IS HARD TO SIT STILL: NOT AT ALL
2. NOT BEING ABLE TO STOP OR CONTROL WORRYING: NOT AT ALL
GAD7 TOTAL SCORE: 2
6. BECOMING EASILY ANNOYED OR IRRITABLE: NOT AT ALL
1. FEELING NERVOUS, ANXIOUS, OR ON EDGE: SEVERAL DAYS
7. FEELING AFRAID AS IF SOMETHING AWFUL MIGHT HAPPEN: NOT AT ALL

## 2019-09-10 ASSESSMENT — MIFFLIN-ST. JEOR: SCORE: 1448.43

## 2019-09-10 ASSESSMENT — PATIENT HEALTH QUESTIONNAIRE - PHQ9
SUM OF ALL RESPONSES TO PHQ QUESTIONS 1-9: 3
5. POOR APPETITE OR OVEREATING: SEVERAL DAYS

## 2019-09-10 NOTE — PROGRESS NOTES
[facility] OB Intake note  Subjective   33 year old woman presents to clinic for initiation of OB care. Patient's last menstrual period was 2019 (approximate).  at 9w2d by Estimated Date of Delivery: 2020 based on US. Reviewed dating ultrasound. Pregnancy is planned.      - Symptoms since LMP include: none. Feeling well overall.  - Genetic/Infection questionnaire completed, risks include FOB's grandmother with SAB x 2. Pt  does not have a recent known exposure to Parvo or CMV so IgG/IgM testing WILL NOT be ordered.   Have you traveled during the pregnancy?No  Have your sexual partner(s) travelled during the pregnancy?No    - Current Medications   Current Outpatient Medications   Medication Sig Dispense Refill     Prenatal Vit-Fe Fumarate-FA (PRENATAL COMPLETE PO)            - Co-morbids   Past Medical History:   Diagnosis Date     Hx of abnormal pap 2014    LSIL, colpo CIN1     - Risk for GDM -  does not  have Personal history of GDM, BMI>30, h/o prediabetes/glucose intolerance, first degree relative with GDM or DM    WILL NOT have an early GCT and possible Hgb A1C    - High Risk for Pre E- meets one of following criteria The patient does not h/o Pre Eclampsia, Current multi fetal gestation, Pre Gestational Diabetes (Type 1 or Type 2), chronic hypertension, renal disease, Autoimmune disease (systematic lupus erythematosus, antiphospholipid syndrome) so WILL NOT start low dose aspirin (81mg) starting between 12 and 28 weeks to prevent early onset preeclampsia.    - Moderate risk - meets more than one of several moderate risk facrtors for Pre E - Nulliparity, Obesity (body mass index >30 kg/m2),Family history of preeclampsia (mother or sister), Sociodemographic characteristics ( race, low socioeconomic status), Age ?35 years, Personal history factors (e.g., low birthweight or small for gestational age, previous adverse pregnancy outcome, >10-year pregnancy interval)  so WILL NOT consider  starting low dose aspirin (81mg) starting between 12 and 28 weeks to prevent early onset preeclampsia.    - The patient  does not have a history of spontaneous  birth so  WILL NOT consider progesterone starting at 16-20 weeks and/or serial transvaginal cervical length ultrasounds from 16-24 weeks.     -The patient does not have a history of immunosuppresion or HIV so Toxoplasma IgG/IgM WILL NOT be ordered.    PERSONAL/SOCIAL HISTORY  Lives with their family.  Employment: Full time, . Her job involves light activity .  History of anxiety or depression: anxiety  Additional items: None    Objective  -VS: reviewed and within normal limits   -General appearance: no acute distress, patient is comfortable   NEUROLOGICAL/PSYCHIATRIC   - Orientated x 3   -Mood and affect: normal     Assessment/Plan:  33 year old  9w2d weeks of pregnancy with CAROLINA of 2020 by ultrasound (2019).  Orders Placed This Encounter   Procedures     FLU VAC PRESRV FREE QUAD SPLIT VIR 3+YRS IM     Vitamin D Deficiency     CBC with platelets differential     Hepatitis B Surface Antibody     Hepatitis B surface antigen     Hepatitis C antibody     HIV Antigen Antibody Combo     Rubella Antibody IgG Quantitative     Treponema Abs w Reflex to RPR and Titer     MAT FETAL MED CTR REFERRAL-PREGNANCY     ABO/Rh type and screen     - Oriented to Practice, types of care, and how to reach a provider.  Discussed CNM vs MD care, pt undecided at this time.  - Patient received 1st trimester new OB education packet complete with aide of The Expectant Family booklet including information on genetic screening test options.    - Patient desires 1st trimester screening which was ordered.  - Educational handout on the prevention of infections diseases during pregnancy provided.  - Patient was encouraged to continue prenatal vitamins as tolerated.    - Patient was sent to lab for routine OB labs.  - Pregnancy concerns to be addressed by  provider at new OB exam include: CNM vs. MD care, GC/CT, pap up to date.  - Pt to RTO for NOB visit in 2-3 weeks and prn if questions or concerns  ANANT Dinh CNM

## 2019-09-10 NOTE — LETTER
9/10/2019       RE: Deb Olivera  [address]  [place] [address]     Dear Colleague,    Thank you for referring your patient, Deb Olivera, to the [facility]. Please see a copy of my visit note below.    S OB Intake note  Subjective   33 year old woman presents to clinic for initiation of OB care. Patient's last menstrual period was 2019 (approximate).  at 9w2d by Estimated Date of Delivery: 2020 based on US. Reviewed dating ultrasound. Pregnancy is planned.      - Symptoms since LMP include: none. Feeling well overall.  - Genetic/Infection questionnaire completed, risks include FOB's grandmother with SAB x 2. Pt  does not have a recent known exposure to Parvo or CMV so IgG/IgM testing WILL NOT be ordered.   Have you traveled during the pregnancy?No  Have your sexual partner(s) travelled during the pregnancy?No    - Current Medications   Current Outpatient Medications   Medication Sig Dispense Refill     Prenatal Vit-Fe Fumarate-FA (PRENATAL COMPLETE PO)            - Co-morbids   Past Medical History:   Diagnosis Date     Hx of abnormal pap 2014    LSIL, colpo CIN1     - Risk for GDM -  does not  have Personal history of GDM, BMI>30, h/o prediabetes/glucose intolerance, first degree relative with GDM or DM    WILL NOT have an early GCT and possible Hgb A1C    - High Risk for Pre E- meets one of following criteria The patient does not h/o Pre Eclampsia, Current multi fetal gestation, Pre Gestational Diabetes (Type 1 or Type 2), chronic hypertension, renal disease, Autoimmune disease (systematic lupus erythematosus, antiphospholipid syndrome) so WILL NOT start low dose aspirin (81mg) starting between 12 and 28 weeks to prevent early onset preeclampsia.    - Moderate risk - meets more than one of several moderate risk facrtors for Pre E - Nulliparity, Obesity (body mass index >30 kg/m2),Family history of  preeclampsia (mother or sister), Sociodemographic characteristics ( race, low socioeconomic status), Age ?35 years, Personal history factors (e.g., low birthweight or small for gestational age, previous adverse pregnancy outcome, >10-year pregnancy interval)  so WILL NOT consider starting low dose aspirin (81mg) starting between 12 and 28 weeks to prevent early onset preeclampsia.    - The patient  does not have a history of spontaneous  birth so  WILL NOT consider progesterone starting at 16-20 weeks and/or serial transvaginal cervical length ultrasounds from 16-24 weeks.     -The patient does not have a history of immunosuppresion or HIV so Toxoplasma IgG/IgM WILL NOT be ordered.    PERSONAL/SOCIAL HISTORY  Lives with their family.  Employment: Full time, . Her job involves light activity .  History of anxiety or depression: anxiety  Additional items: None    Objective  -VS: reviewed and within normal limits   -General appearance: no acute distress, patient is comfortable   NEUROLOGICAL/PSYCHIATRIC   - Orientated x 3   -Mood and affect: normal     Assessment/Plan:  33 year old  9w2d weeks of pregnancy with CAROLINA of 2020 by ultrasound (2019).  Orders Placed This Encounter   Procedures     FLU VAC PRESRV FREE QUAD SPLIT VIR 3+YRS IM     Vitamin D Deficiency     CBC with platelets differential     Hepatitis B Surface Antibody     Hepatitis B surface antigen     Hepatitis C antibody     HIV Antigen Antibody Combo     Rubella Antibody IgG Quantitative     Treponema Abs w Reflex to RPR and Titer     MAT FETAL MED CTR REFERRAL-PREGNANCY     ABO/Rh type and screen     - Oriented to Practice, types of care, and how to reach a provider.  Discussed CNM vs MD care, pt undecided at this time.  - Patient received 1st trimester new OB education packet complete with aide of The Expectant Family booklet including information on genetic screening test options.    - Patient desires 1st  trimester screening which was ordered.  - Educational handout on the prevention of infections diseases during pregnancy provided.  - Patient was encouraged to continue prenatal vitamins as tolerated.    - Patient was sent to lab for routine OB labs.  - Pregnancy concerns to be addressed by provider at new OB exam include: CNM vs. MD care, GC/CT, pap up to date.  - Pt to RTO for NOB visit in 2-3 weeks and prn if questions or concerns.    ANANT Dinh CNM

## 2019-09-11 LAB
BACTERIA SPEC CULT: NORMAL
DEPRECATED CALCIDIOL+CALCIFEROL SERPL-MC: 41 UG/L (ref 20–75)
HBV SURFACE AB SERPL IA-ACNC: 179.48 M[IU]/ML
HBV SURFACE AG SERPL QL IA: NONREACTIVE
HCV AB SERPL QL IA: NONREACTIVE
HIV 1+2 AB+HIV1 P24 AG SERPL QL IA: NONREACTIVE
RUBV IGG SERPL IA-ACNC: 24 IU/ML
SPECIMEN SOURCE: NORMAL
T PALLIDUM AB SER QL: NONREACTIVE

## 2019-09-24 ENCOUNTER — ANCILLARY PROCEDURE (OUTPATIENT)
Dept: ULTRASOUND IMAGING | Facility: CLINIC | Age: 34
End: 2019-09-24
Attending: ADVANCED PRACTICE MIDWIFE
Payer: COMMERCIAL

## 2019-09-24 ENCOUNTER — OFFICE VISIT (OUTPATIENT)
Dept: OBGYN | Facility: CLINIC | Age: 34
End: 2019-09-24
Attending: ADVANCED PRACTICE MIDWIFE
Payer: COMMERCIAL

## 2019-09-24 VITALS
DIASTOLIC BLOOD PRESSURE: 73 MMHG | BODY MASS INDEX: 24.06 KG/M2 | HEART RATE: 109 BPM | SYSTOLIC BLOOD PRESSURE: 106 MMHG | WEIGHT: 153.3 LBS | HEIGHT: 67 IN

## 2019-09-24 DIAGNOSIS — Z34.01 ENCOUNTER FOR SUPERVISION OF NORMAL FIRST PREGNANCY IN FIRST TRIMESTER: ICD-10-CM

## 2019-09-24 DIAGNOSIS — Z34.01 ENCOUNTER FOR SUPERVISION OF NORMAL FIRST PREGNANCY IN FIRST TRIMESTER: Primary | ICD-10-CM

## 2019-09-24 PROCEDURE — 87591 N.GONORRHOEAE DNA AMP PROB: CPT | Performed by: ADVANCED PRACTICE MIDWIFE

## 2019-09-24 PROCEDURE — G0463 HOSPITAL OUTPT CLINIC VISIT: HCPCS | Mod: 25,ZF

## 2019-09-24 PROCEDURE — 76801 OB US < 14 WKS SINGLE FETUS: CPT

## 2019-09-24 PROCEDURE — 87491 CHLMYD TRACH DNA AMP PROBE: CPT | Performed by: ADVANCED PRACTICE MIDWIFE

## 2019-09-24 ASSESSMENT — ANXIETY QUESTIONNAIRES
GAD7 TOTAL SCORE: 2
7. FEELING AFRAID AS IF SOMETHING AWFUL MIGHT HAPPEN: NOT AT ALL
3. WORRYING TOO MUCH ABOUT DIFFERENT THINGS: NOT AT ALL
2. NOT BEING ABLE TO STOP OR CONTROL WORRYING: NOT AT ALL
1. FEELING NERVOUS, ANXIOUS, OR ON EDGE: SEVERAL DAYS
6. BECOMING EASILY ANNOYED OR IRRITABLE: NOT AT ALL
5. BEING SO RESTLESS THAT IT IS HARD TO SIT STILL: NOT AT ALL

## 2019-09-24 ASSESSMENT — PATIENT HEALTH QUESTIONNAIRE - PHQ9: 5. POOR APPETITE OR OVEREATING: SEVERAL DAYS

## 2019-09-24 ASSESSMENT — MIFFLIN-ST. JEOR: SCORE: 1437.99

## 2019-09-24 NOTE — LETTER
2019       RE: Deb Olivera  [address]  [place] [address]     Dear Colleague,    Thank you for referring your patient, Deb Olivera, to the [facility] at [facility]. Please see a copy of my visit note below.    Subjective:   Deb is a 33 year old female who presents to clinic for a new OB visit.   at 11w2d with Estimated Date of Delivery: 2020 based on US. Feels well. Has started PNV.     She has not had bleeding since her LMP.   She has had nausea. Weight loss has occurred, for a total of <2 pounds.   This was a planned pregnancy.   FOB is involved.     OTHER CONCERNS:    - Questions about complications in pregnancy (pre-e, GDM) and how to prevent   - Genetic screening visit agenda and when they can find out sex of baby   - Childbirth education classes    ===========================================   ROS: 10 point ROS neg other than the symptoms noted above in the HPI.    PSYCHIATRIC:  Denies mood changes  PHQ-9 score:    PHQ-9 SCORE 9/10/2019   PHQ-9 Total Score 3     BRADLEY-7 SCORE 2019 9/10/2019 2019   Total Score - - -   Total Score 1 (minimal anxiety) - -   Total Score 1 2 2     Past History:  Her past medical history   Past Medical History:   Diagnosis Date     Hx of abnormal pap 2014    LSIL, colpo CIN1     This is her first pregnancy  Since her last LMP she denies use of alcohol, tobacco and street drugs.  HISTORY:  Family History   Problem Relation Age of Onset     Diabetes Paternal Grandfather      Other Cancer Paternal Grandfather      Lipids Father      Other Cancer Father         testicular     Hypertension Father      Other Cancer Maternal Grandmother         brain cancer     Lung Cancer Paternal Grandmother      Social History     Socioeconomic History     Marital status:      Spouse name: Elan      Number of children: 0     Years of education: Not on file     Highest education level: Not on file    Occupational History     Occupation:       Employer: [employer]     Comment: FT, business/real estate   Social Needs     Financial resource strain: Not on file     Food insecurity:     Worry: Not on file     Inability: Not on file     Transportation needs:     Medical: Not on file     Non-medical: Not on file   Tobacco Use     Smoking status: Former Smoker     Packs/day: 0.00     Years: 2.00     Pack years: 0.00     Types: Cigarettes     Smokeless tobacco: Never Used     Tobacco comment: when 19   Substance and Sexual Activity     Alcohol use: Not Currently     Comment: occ     Drug use: No     Sexual activity: Yes     Partners: Male     Birth control/protection: None   Lifestyle     Physical activity:     Days per week: Not on file     Minutes per session: Not on file     Stress: Not on file   Relationships     Social connections:     Talks on phone: Not on file     Gets together: Not on file     Attends Sikhism service: Not on file     Active member of club or organization: Not on file     Attends meetings of clubs or organizations: Not on file     Relationship status: Not on file     Intimate partner violence:     Fear of current or ex partner: Not on file     Emotionally abused: Not on file     Physically abused: Not on file     Forced sexual activity: Not on file   Other Topics Concern     Parent/sibling w/ CABG, MI or angioplasty before 65F 55M? Not Asked   Social History Narrative     Not on file     Current Outpatient Medications   Medication Sig     Prenatal Vit-Fe Fumarate-FA (PRENATAL COMPLETE PO)      No current facility-administered medications for this visit.      No Known Allergies    ============================================  MEDICAL HISTORY  Past Medical History:   Diagnosis Date     Hx of abnormal pap 04/2014    LSIL, colpo CIN1     Past Surgical History:   Procedure Laterality Date     ESOPHAGOSCOPY, GASTROSCOPY, DUODENOSCOPY (EGD), COMBINED N/A 1/7/2019    Procedure:  "COMBINED ESOPHAGOSCOPY, GASTROSCOPY, DUODENOSCOPY (EGD);  Surgeon: Vinay Childers MD;  Location: [facility]       OB History    Para Term  AB Living   1 0 0 0 0 0   SAB TAB Ectopic Multiple Live Births   0 0 0 0 0      # Outcome Date GA Lbr Jake/2nd Weight Sex Delivery Anes PTL Lv   1 Current              GYN History- + Abnormal Pap Smears; last pap 2018 NIL                        Cervical procedures: colpo                        History of STI: denies    I personally reviewed the past social/family/medical and surgical history on the date of service.   I reviewed lab work done at Intake visit with patient.    EXAM:  /73 (BP Location: Left arm, Patient Position: Chair)   Pulse 109   Ht 1.702 m (5' 7\")   Wt 69.5 kg (153 lb 4.8 oz)   LMP 2019 (Approximate)   BMI 24.01 kg/m      EXAM:  GENERAL:  Pleasant pregnant female, alert, cooperative and well groomed.  SKIN:  Warm and dry, without lesions or rashes  HEAD: Symmetrical features.  MOUTH:  Buccal mucosa pink, moist without lesions.  Teeth in good repair.    NECK:  Thyroid without enlargement and nodules.  Lymph nodes not palpable.   LUNGS:  Clear to auscultation.  BREAST:    No dominant, fixed or suspicious masses are noted.  No skin or nipple changes or axillary nodes.   Nipples everted.      HEART:  RRR without murmur.  ABDOMEN: Soft without masses , tenderness or organomegaly.  No CVA tenderness.  No scars noted.  MUSCULOSKELETAL:  Full range of motion  EXTREMITIES:  No edema. No significant varicosities.   PELVIC EXAM: Deferred  WET PREP:Not done  GC/CHLAMYDIA CULTURE OBTAINED:YES    Lab Results   Component Value Date    PAP NIL 04/10/2018      Assessment:  33 year old , 11w2d weeks of pregnancy with CAROLINA of 2020 by US   Genetic Screening: First Trimester Screen    ICD-10-CM    1. Encounter for supervision of normal first pregnancy in first trimester Z34.01 Gonorrhea PCR     Chlamydia PCR (Clinic Collect)     Plan:  - " Reviewed use of triage nurse line and contacting the on-call provider after hours for an urgent need such as fever, vagina bleeding, bladder or vaginal infection, rupture of membranes,  or term labor.    - Reviewed best evidence for: weight gain for her weight and height for pregnancy:  Based on pre-pregnancy weight of 155 and Body mass index is 24.01 kg/m . RECOMMENDED WEIGHT GAIN: 15-25 lbs.  - Reviewed healthy diet and foods to avoid; exercise and activity during pregnancy; avoiding exposure to toxoplasmosis; and maintenance of a generally healthy lifestyle.   - Discussed the harms, benefits, side effects and alternative therapies for current prescribed and OTC medications.  - All pt's and partner's questions discussed and answered.  Pt verbalized understanding of and agreement to plan of care.   - Discuss recommended weight gain at next visit  - Information provided for childbirth education organizations  - Ultrasound today to evaluate FHTs  - Continue scheduled prenatal care and prn if questions or concerns, RTC in 4-5 weeks    ANANT Dinh CNM

## 2019-09-24 NOTE — PROGRESS NOTES
Subjective:   Deb is a 33 year old female who presents to clinic for a new OB visit.   at 11w2d with Estimated Date of Delivery: 2020 based on US. Feels well. Has started PNV.     She has not had bleeding since her LMP.   She has had nausea. Weight loss has occurred, for a total of <2 pounds.   This was a planned pregnancy.   FOB is involved.     OTHER CONCERNS:    - Questions about complications in pregnancy (pre-e, GDM) and how to prevent   - Genetic screening visit agenda and when they can find out sex of baby   - Childbirth education classes    ===========================================   ROS: 10 point ROS neg other than the symptoms noted above in the HPI.    PSYCHIATRIC:  Denies mood changes  PHQ-9 score:    PHQ-9 SCORE 9/10/2019   PHQ-9 Total Score 3     BRADLEY-7 SCORE 2019 9/10/2019 2019   Total Score - - -   Total Score 1 (minimal anxiety) - -   Total Score 1 2 2     Past History:  Her past medical history   Past Medical History:   Diagnosis Date     Hx of abnormal pap 2014    LSIL, colpo CIN1     This is her first pregnancy  Since her last LMP she denies use of alcohol, tobacco and street drugs.  HISTORY:  Family History   Problem Relation Age of Onset     Diabetes Paternal Grandfather      Other Cancer Paternal Grandfather      Lipids Father      Other Cancer Father         testicular     Hypertension Father      Other Cancer Maternal Grandmother         brain cancer     Lung Cancer Paternal Grandmother      Social History     Socioeconomic History     Marital status:      Spouse name: Elan      Number of children: 0     Years of education: Not on file     Highest education level: Not on file   Occupational History     Occupation:       Employer: [employer]     Comment: FT, business/real estate   Social Needs     Financial resource strain: Not on file     Food insecurity:     Worry: Not on file     Inability: Not on file     Transportation needs:     Medical:  Not on file     Non-medical: Not on file   Tobacco Use     Smoking status: Former Smoker     Packs/day: 0.00     Years: 2.00     Pack years: 0.00     Types: Cigarettes     Smokeless tobacco: Never Used     Tobacco comment: when 19   Substance and Sexual Activity     Alcohol use: Not Currently     Comment: occ     Drug use: No     Sexual activity: Yes     Partners: Male     Birth control/protection: None   Lifestyle     Physical activity:     Days per week: Not on file     Minutes per session: Not on file     Stress: Not on file   Relationships     Social connections:     Talks on phone: Not on file     Gets together: Not on file     Attends Restorationist service: Not on file     Active member of club or organization: Not on file     Attends meetings of clubs or organizations: Not on file     Relationship status: Not on file     Intimate partner violence:     Fear of current or ex partner: Not on file     Emotionally abused: Not on file     Physically abused: Not on file     Forced sexual activity: Not on file   Other Topics Concern     Parent/sibling w/ CABG, MI or angioplasty before 65F 55M? Not Asked   Social History Narrative     Not on file     Current Outpatient Medications   Medication Sig     Prenatal Vit-Fe Fumarate-FA (PRENATAL COMPLETE PO)      No current facility-administered medications for this visit.      No Known Allergies    ============================================  MEDICAL HISTORY  Past Medical History:   Diagnosis Date     Hx of abnormal pap 2014    LSIL, colpo CIN1     Past Surgical History:   Procedure Laterality Date     ESOPHAGOSCOPY, GASTROSCOPY, DUODENOSCOPY (EGD), COMBINED N/A 2019    Procedure: COMBINED ESOPHAGOSCOPY, GASTROSCOPY, DUODENOSCOPY (EGD);  Surgeon: Vinay Childers MD;  [facility]       OB History    Para Term  AB Living   1 0 0 0 0 0   SAB TAB Ectopic Multiple Live Births   0 0 0 0 0      # Outcome Date GA Lbr Jake/2nd Weight Sex Delivery Anes PTL Lv  "  1 Current              GYN History- + Abnormal Pap Smears; last pap 2018 NIL                        Cervical procedures: colpo                        History of STI: denies    I personally reviewed the past social/family/medical and surgical history on the date of service.   I reviewed lab work done at Intake visit with patient.    EXAM:  /73 (BP Location: Left arm, Patient Position: Chair)   Pulse 109   Ht 1.702 m (5' 7\")   Wt 69.5 kg (153 lb 4.8 oz)   LMP 2019 (Approximate)   BMI 24.01 kg/m     EXAM:  GENERAL:  Pleasant pregnant female, alert, cooperative and well groomed.  SKIN:  Warm and dry, without lesions or rashes  HEAD: Symmetrical features.  MOUTH:  Buccal mucosa pink, moist without lesions.  Teeth in good repair.    NECK:  Thyroid without enlargement and nodules.  Lymph nodes not palpable.   LUNGS:  Clear to auscultation.  BREAST:    No dominant, fixed or suspicious masses are noted.  No skin or nipple changes or axillary nodes.   Nipples everted.      HEART:  RRR without murmur.  ABDOMEN: Soft without masses , tenderness or organomegaly.  No CVA tenderness.  No scars noted.  MUSCULOSKELETAL:  Full range of motion  EXTREMITIES:  No edema. No significant varicosities.   PELVIC EXAM: Deferred  WET PREP:Not done  GC/CHLAMYDIA CULTURE OBTAINED:YES    Lab Results   Component Value Date    PAP NIL 04/10/2018      Assessment:  33 year old , 11w2d weeks of pregnancy with CAROLINA of 2020 by US   Genetic Screening: First Trimester Screen    ICD-10-CM    1. Encounter for supervision of normal first pregnancy in first trimester Z34.01 Gonorrhea PCR     Chlamydia PCR (Clinic Collect)     Plan:  - Reviewed use of triage nurse line and contacting the on-call provider after hours for an urgent need such as fever, vagina bleeding, bladder or vaginal infection, rupture of membranes,  or term labor.    - Reviewed best evidence for: weight gain for her weight and height for pregnancy:  Based " on pre-pregnancy weight of 155 and Body mass index is 24.01 kg/m . RECOMMENDED WEIGHT GAIN: 15-25 lbs.  - Reviewed healthy diet and foods to avoid; exercise and activity during pregnancy; avoiding exposure to toxoplasmosis; and maintenance of a generally healthy lifestyle.   - Discussed the harms, benefits, side effects and alternative therapies for current prescribed and OTC medications.  - All pt's and partner's questions discussed and answered.  Pt verbalized understanding of and agreement to plan of care.   - Discuss recommended weight gain at next visit  - Information provided for childbirth education organizations  - Ultrasound today to evaluate FHTs  - Continue scheduled prenatal care and prn if questions or concerns, RTC in 4-5 weeks    ANANT Dinh CNM

## 2019-09-25 LAB
C TRACH DNA SPEC QL NAA+PROBE: NEGATIVE
N GONORRHOEA DNA SPEC QL NAA+PROBE: NEGATIVE
SPECIMEN SOURCE: NORMAL
SPECIMEN SOURCE: NORMAL

## 2019-09-25 ASSESSMENT — ANXIETY QUESTIONNAIRES: GAD7 TOTAL SCORE: 2

## 2019-10-02 ENCOUNTER — PRE VISIT (OUTPATIENT)
Dept: MATERNAL FETAL MEDICINE | Facility: CLINIC | Age: 34
End: 2019-10-02

## 2019-10-04 ENCOUNTER — HOSPITAL ENCOUNTER (OUTPATIENT)
Dept: ULTRASOUND IMAGING | Facility: CLINIC | Age: 34
Discharge: HOME | End: 2019-10-04
Attending: ADVANCED PRACTICE MIDWIFE | Admitting: ADVANCED PRACTICE MIDWIFE
Payer: COMMERCIAL

## 2019-10-04 ENCOUNTER — OFFICE VISIT (OUTPATIENT)
Dept: MATERNAL FETAL MEDICINE | Facility: CLINIC | Age: 34
End: 2019-10-04
Attending: ADVANCED PRACTICE MIDWIFE
Payer: COMMERCIAL

## 2019-10-04 DIAGNOSIS — Z36.9 FIRST TRIMESTER SCREENING: Primary | ICD-10-CM

## 2019-10-04 DIAGNOSIS — O26.90 PREGNANCY RELATED CONDITION, ANTEPARTUM: ICD-10-CM

## 2019-10-04 PROCEDURE — 36415 COLL VENOUS BLD VENIPUNCTURE: CPT | Performed by: OBSTETRICS & GYNECOLOGY

## 2019-10-04 PROCEDURE — 76813 OB US NUCHAL MEAS 1 GEST: CPT

## 2019-10-04 PROCEDURE — 82105 ALPHA-FETOPROTEIN SERUM: CPT | Performed by: OBSTETRICS & GYNECOLOGY

## 2019-10-04 PROCEDURE — 84163 PAPPA SERUM: CPT | Performed by: OBSTETRICS & GYNECOLOGY

## 2019-10-04 PROCEDURE — 84704 HCG FREE BETACHAIN TEST: CPT | Performed by: OBSTETRICS & GYNECOLOGY

## 2019-10-04 PROCEDURE — 96040 ZZH GENETIC COUNSELING, EACH 30 MINUTES: CPT | Mod: ZF | Performed by: GENETIC COUNSELOR, MS

## 2019-10-04 NOTE — PROGRESS NOTES
[facility]  Genetic Counseling Consult    Patient: Deb Olivera YOB: 1985   Date of Service: 10/04/19      Deb Olivera was seen at [facility] for genetic consultation to discuss the options for routine screening for fetal chromosome abnormalities. Deb was accompanied to the appointment by her  Elan.       Impression/Plan:   1.  Deb had an ultrasound and blood draw for first trimester screening.  Results are expected within 5-7 days, and will be available in Join The Wellness Team.  We will contact her to discuss the results, and a copy will be forwarded to the office of the referring OB provider. We will call Deb with the results at [phone], if a voicemail is reached she requests we leave results in a detailed message.     2. Maternal serum AFP (single marker screen) is recommended after 15 weeks to screen for open neural tube defects. A quad screen should not be performed.    Pregnancy History:   /Parity:    Age at Delivery: 34 year old  CAROLINA: 2020, by Ultrasound on 19  Gestational Age: 12w5d    No significant complications or exposures were reported in the current pregnancy.      Medical History:   Deb s reported medical history is not expected to impact pregnancy management or risks to fetal development.       Family History:   A three-generation pedigree was obtained, and is scanned under the  Media  tab.     The following significant findings were reported by Deb:    Deb has a paternal aunt and a paternal cousin who had stillbirths. The specific causes of these stillbirths are unknown. Without a known cause for these losses, it is difficult to determine a possible recurrence risk for Deb's pregnancy.     Otherwise, the reported family history is negative for multiple miscarriages, stillbirths, birth defects, mental retardation, known genetic conditions, and consanguinity.       Carrier Screening:    The patient reports that she and the father of the pregnancy have  ancestry:      Cystic fibrosis is an autosomal recessive genetic condition that occurs with increased frequency in individuals of  ancestry and carrier screening for this condition is available.  In addition,  screening in the [facility] includes cystic fibrosis.      Expanded carrier screening for mutations in a large panel of genes associated with autosomal recessive conditions including cystic fibrosis, spinal muscular atrophy, and others, is now available.      The patient was not certain about whether to pursue carrier screening today.  She was provided with a brochure about her testing options, and contact information if she has questions or wishes to pursue screening.       Risk Assessment for Chromosome Conditions:   We explained that the risk for fetal chromosome abnormalities increases with maternal age. We discussed specific features of common chromosome abnormalities, including Down syndrome, trisomy 13, trisomy 18, and sex chromosome trisomies.      - At age 34 at midtrimester, the risk to have a baby with Down syndrome is 1 in 421.     - At age 34 at midtrimester, the risk to have a baby with any chromosome abnormality is 1 in 217.    Testing Options:   We discussed the following options:   First trimester screening    First trimester ultrasound with nuchal translucency and nasal bone assessments, maternal plasma hCG, HEAVEN-A, and AFP measurement    Screens for fetal trisomy 21, trisomy 13, and trisomy 18    Cannot screen for open neural tube defects; maternal serum AFP after 15 weeks is recommended     Non-invasive Prenatal Testing (NIPT)    Maternal plasma cell-free DNA testing; first trimester ultrasound with nuchal translucency and nasal bone assessment is recommended, when appropriate    Screens for fetal trisomy 21, trisomy 13, trisomy 18, and sex chromosome aneuploidy    Cannot screen for open  neural tube defects; maternal serum AFP after 15 weeks is recommended        We reviewed the benefits and limitations of this testing.  Screening tests provide a risk assessment specific to the pregnancy for certain fetal chromosome abnormalities, but cannot definitively diagnose or exclude a fetal chromosome abnormality.  Follow-up genetic counseling and consideration of diagnostic testing is recommended with any abnormal screening result.      It was a pleasure to be involved with [facility]. Face-to-face time of the meeting was 35 minutes.    Jalyn Hernandes, Genetic Counseling Student     Adelina Garcia MS, [facility]  Maternal Fetal Medicine  [facility]  Ph: [phone]  [email or web address]

## 2019-10-08 ENCOUNTER — TELEPHONE (OUTPATIENT)
Dept: MATERNAL FETAL MEDICINE | Facility: CLINIC | Age: 34
End: 2019-10-08

## 2019-10-08 LAB — LAB SCANNED RESULT: NORMAL

## 2019-10-08 NOTE — TELEPHONE ENCOUNTER
I spoke with Deb today regarding her normal first trimester screen results. Specifically, the chance this pregnancy has Down syndrome was reduced from her age related risk of 1 in 432 to less than 1 in 10,000. Similarly, the chance this pregnancy has trisomy 18/trisomy 13 was reduced from her age related risk of 1 in 830 to less than 1 in 10,000. This result significantly reduces the chance this pregnancy has Down syndrome, trisomy 18, or trisomy 13.     We discussed that these results are very reassuring, but there remains a small chance for a false positive or false negative result. This screen also does not address all chromosome abnormalities or all causes of birth defects and cognitive delay. As such, Deb will still have the option of the Innatal screen and/or diagnostic testing ([facility] or amniocentesis) if she is interested or there is an indication later in the pregnancy. Deb declines additional testing or screening at this time. She also has the option of having a maternal serum AFP blood draw after 15 weeks to screen for ONTD; she is encouraged to discuss this option with her primary OB provider. Deb is also scheduled to return for a comprehensive ultrasound on 11/11.     All of Deb's questions were answered to her satisfaction and I encouraged her to contact me if she has any questions in the future. A copy of this note and her first trimester screen results will be forwarded to her primary OB provider.    Adelina Garcia MS, formerly [facility]  Maternal Fetal Medicine  [facility]  Ph: [phone]  [email or web address]

## 2019-10-15 ASSESSMENT — ANXIETY QUESTIONNAIRES
1. FEELING NERVOUS, ANXIOUS, OR ON EDGE: SEVERAL DAYS
GAD7 TOTAL SCORE: 3
GAD7 TOTAL SCORE: 3
5. BEING SO RESTLESS THAT IT IS HARD TO SIT STILL: NOT AT ALL
6. BECOMING EASILY ANNOYED OR IRRITABLE: NOT AT ALL
3. WORRYING TOO MUCH ABOUT DIFFERENT THINGS: SEVERAL DAYS
7. FEELING AFRAID AS IF SOMETHING AWFUL MIGHT HAPPEN: NOT AT ALL
4. TROUBLE RELAXING: SEVERAL DAYS
2. NOT BEING ABLE TO STOP OR CONTROL WORRYING: NOT AT ALL
7. FEELING AFRAID AS IF SOMETHING AWFUL MIGHT HAPPEN: NOT AT ALL

## 2019-10-16 ASSESSMENT — ANXIETY QUESTIONNAIRES: GAD7 TOTAL SCORE: 3

## 2019-10-22 ENCOUNTER — OFFICE VISIT (OUTPATIENT)
Dept: OBGYN | Facility: CLINIC | Age: 34
End: 2019-10-22
Attending: ADVANCED PRACTICE MIDWIFE
Payer: COMMERCIAL

## 2019-10-22 VITALS
SYSTOLIC BLOOD PRESSURE: 112 MMHG | HEART RATE: 106 BPM | BODY MASS INDEX: 24.37 KG/M2 | DIASTOLIC BLOOD PRESSURE: 72 MMHG | HEIGHT: 67 IN | WEIGHT: 155.3 LBS

## 2019-10-22 DIAGNOSIS — Z34.02 ENCOUNTER FOR SUPERVISION OF NORMAL FIRST PREGNANCY IN SECOND TRIMESTER: Primary | ICD-10-CM

## 2019-10-22 PROCEDURE — G0463 HOSPITAL OUTPT CLINIC VISIT: HCPCS | Mod: ZF

## 2019-10-22 ASSESSMENT — MIFFLIN-ST. JEOR: SCORE: 1447.07

## 2019-10-22 NOTE — PROGRESS NOTES
"Subjective:     33 year old  at 15w2d presents for a routine prenatal appointment.      Denies vaginal bleeding or leakage of fluid.  Denies contractions or cramping.  + fetal movement.       No HA, visual changes, RUQ or epigastric pain.   The patient presents with the following concerns:    - Occasional dizziness, typically upon standing, resolves quickly. Denies LOC.  Trying to stay well-hydrated, reports drinking one larger bottle of water per day. Reports having continued decreased appetite but has been trying to eat regularly.    - Questions about pain management options,  rates  Level II US-  Scheduled 19  Offered AFP, pt will consider.     Objective:  Vitals:    10/22/19 1556   BP: 112/72   BP Location: Left arm   Patient Position: Chair   Pulse: 106   Weight: 70.4 kg (155 lb 4.8 oz)   Height: 1.702 m (5' 7\")   See OB flowsheet    Assessment/Plan:   Encounter Diagnosis   Name Primary?     Encounter for supervision of normal first pregnancy in second trimester Yes     - Reviewed overall weight gain, encouraged physical activity and mindful nutritional choices.  Reviewed recommendation for 15-30 lbs weight gain during pregnancy.      - Reviewed why/how to contact provider.   - Patient education/orders or handouts today: PTL signs/symptoms, AFP only, Fetal movement and Level 2 u/s scheduled   - Encouraged increased PO hydration, regular snacks, and slower, conscious body movements. If symptoms worsen or persist, to call clinic  - Reviewed pain management options for labor, including non-pharmacologic methods. Reviewed  rates for Birthplace and CNM group.  - AFP placed as future order, pt will have drawn with Level II if desired  - Return to clinic in 4-6 weeks and prn if questions or concerns.   ANANT Dinh CNM  Answers for HPI/ROS submitted by the patient on 10/15/2019   BRADLEY 7 TOTAL SCORE: 3    "

## 2019-10-22 NOTE — LETTER
"10/22/2019       RE: Deb Olivera  [address]  [place] [address]     Dear Colleague,    Thank you for referring your patient, Deb Olivera, to the [facility] at [facility]. Please see a copy of my visit note below.    Subjective:     33 year old  at 15w2d presents for a routine prenatal appointment.      Denies vaginal bleeding or leakage of fluid.  Denies contractions or cramping.  + fetal movement.       No HA, visual changes, RUQ or epigastric pain.   The patient presents with the following concerns:    - Occasional dizziness, typically upon standing, resolves quickly. Denies LOC.  Trying to stay well-hydrated, reports drinking one larger bottle of water per day. Reports having continued decreased appetite but has been trying to eat regularly.    - Questions about pain management options,  rates  Level II US-  Scheduled 19  Offered AFP, pt will consider.     Objective:  Vitals:    10/22/19 1556   BP: 112/72   BP Location: Left arm   Patient Position: Chair   Pulse: 106   Weight: 70.4 kg (155 lb 4.8 oz)   Height: 1.702 m (5' 7\")   See OB flowsheet    Assessment/Plan:   Encounter Diagnosis   Name Primary?     Encounter for supervision of normal first pregnancy in second trimester Yes     - Reviewed overall weight gain, encouraged physical activity and mindful nutritional choices.  Reviewed recommendation for 15-30 lbs weight gain during pregnancy.      - Reviewed why/how to contact provider.   - Patient education/orders or handouts today: PTL signs/symptoms, AFP only, Fetal movement and Level 2 u/s scheduled   - Encouraged increased PO hydration, regular snacks, and slower, conscious body movements. If symptoms worsen or persist, to call clinic  - Reviewed pain management options for labor, including non-pharmacologic methods. Reviewed  rates for Birthplace and CNM group.  - AFP placed as future order, pt will have " drawn with Level II if desired  - Return to clinic in 4-6 weeks and prn if questions or concerns.     Taran Greene, APRN CNM    Answers for HPI/ROS submitted by the patient on 10/15/2019   BRADLEY 7 TOTAL SCORE: 3

## 2019-11-11 ENCOUNTER — OFFICE VISIT (OUTPATIENT)
Dept: MATERNAL FETAL MEDICINE | Facility: CLINIC | Age: 34
End: 2019-11-11
Attending: ADVANCED PRACTICE MIDWIFE
Payer: COMMERCIAL

## 2019-11-11 ENCOUNTER — HOSPITAL ENCOUNTER (OUTPATIENT)
Dept: ULTRASOUND IMAGING | Facility: CLINIC | Age: 34
Discharge: HOME | End: 2019-11-11
Attending: ADVANCED PRACTICE MIDWIFE | Admitting: ADVANCED PRACTICE MIDWIFE
Payer: COMMERCIAL

## 2019-11-11 DIAGNOSIS — O26.90 PREGNANCY RELATED CONDITION, ANTEPARTUM: Primary | ICD-10-CM

## 2019-11-11 DIAGNOSIS — Z36.89 ENCOUNTER FOR FETAL ANATOMIC SURVEY: ICD-10-CM

## 2019-11-11 DIAGNOSIS — O26.90 PREGNANCY RELATED CONDITION, ANTEPARTUM: ICD-10-CM

## 2019-11-11 PROCEDURE — 76811 OB US DETAILED SNGL FETUS: CPT

## 2019-11-11 NOTE — PROGRESS NOTES
"Please see \"Imaging\" tab under \"Chart Review\" for details of today's US.    Brenna Glez, DO    "

## 2019-11-16 ENCOUNTER — TELEPHONE (OUTPATIENT)
Dept: OBGYN | Facility: CLINIC | Age: 34
End: 2019-11-16

## 2019-11-16 NOTE — TELEPHONE ENCOUNTER
Returned call to patient. Pt C/O spots in vision for the past 10 mins. States she experienced this last week for 10 mins and then took a short nap, and when she woke it was resolved. Pt reports being up late for last several nights and packing due to move tomorrow. Feels slightly more tired than usual, but otherwise feels well. Denies dizziness, lightheadedness, and headache. Pt encouraged to increase fluid intake, rest and try to nap. Pt encouraged to return call if symptoms do not resolve or worsen. Discussed additional neurologic signs would be worrisome and warrant immediate ED evaluation including paresthesia, difficulty with talking or movement, or intense headache.      Lucía Mcclellan CNM

## 2019-11-18 ASSESSMENT — ANXIETY QUESTIONNAIRES
2. NOT BEING ABLE TO STOP OR CONTROL WORRYING: NOT AT ALL
5. BEING SO RESTLESS THAT IT IS HARD TO SIT STILL: SEVERAL DAYS
7. FEELING AFRAID AS IF SOMETHING AWFUL MIGHT HAPPEN: SEVERAL DAYS
GAD7 TOTAL SCORE: 5
7. FEELING AFRAID AS IF SOMETHING AWFUL MIGHT HAPPEN: SEVERAL DAYS
3. WORRYING TOO MUCH ABOUT DIFFERENT THINGS: NOT AT ALL
GAD7 TOTAL SCORE: 5
1. FEELING NERVOUS, ANXIOUS, OR ON EDGE: SEVERAL DAYS
6. BECOMING EASILY ANNOYED OR IRRITABLE: SEVERAL DAYS
4. TROUBLE RELAXING: SEVERAL DAYS

## 2019-11-19 ENCOUNTER — OFFICE VISIT (OUTPATIENT)
Dept: OBGYN | Facility: CLINIC | Age: 34
End: 2019-11-19
Attending: ADVANCED PRACTICE MIDWIFE
Payer: COMMERCIAL

## 2019-11-19 VITALS
HEIGHT: 67 IN | HEART RATE: 101 BPM | BODY MASS INDEX: 25.11 KG/M2 | WEIGHT: 160 LBS | SYSTOLIC BLOOD PRESSURE: 106 MMHG | DIASTOLIC BLOOD PRESSURE: 72 MMHG

## 2019-11-19 DIAGNOSIS — Z34.02 ENCOUNTER FOR SUPERVISION OF NORMAL FIRST PREGNANCY IN SECOND TRIMESTER: Primary | ICD-10-CM

## 2019-11-19 PROCEDURE — G0463 HOSPITAL OUTPT CLINIC VISIT: HCPCS | Mod: ZF

## 2019-11-19 ASSESSMENT — ANXIETY QUESTIONNAIRES: GAD7 TOTAL SCORE: 5

## 2019-11-19 ASSESSMENT — MIFFLIN-ST. JEOR: SCORE: 1463.39

## 2019-11-19 ASSESSMENT — PAIN SCALES - GENERAL: PAINLEVEL: NO PAIN (0)

## 2019-11-19 NOTE — PROGRESS NOTES
"Subjective:     34 year old  at 19w2d presents for a routine prenatal appointment.      Denies vaginal bleeding or leakage of fluid.  Denies contractions or cramping.  Slight fetal movement.       No HA, visual changes, RUQ or epigastric pain.   Level II US  Results reviewed normal scan.   Offered AFP, declined  The patient presents with the following concerns:    - Reports occasional episodes of heart palpitations, feeling out of breath at top of stairs, and stars in vision unrelated to position change. Symptoms do not occur all at same time.  Denies headaches, chest pain, SOB at rest.   - Reports continued anxiety about the birthing process and the unknown and possible discomfort. Would like to avoid epidural if possible.     - Questions about  monitoring systems (owlette)     Objective:  Vitals:    19 1350   BP: 106/72   Pulse: 101   Weight: 72.6 kg (160 lb)   Height: 1.702 m (5' 7\")     See OB flowsheet    Assessment/Plan:  Encounter Diagnosis   Name Primary?     Encounter for supervision of normal first pregnancy in second trimester Yes     - Reviewed total weight gain, encouraged continued healthy diet and exercise.      - Reviewed why/how to contact provider.   - Patient education/orders or handouts today: PTL signs/symptoms, AFP only and Fetal movement   - Discussed palpitations/changes in vision and possibility of physiologic changes associated with pregnancy, but recommended follow-up with Dr. Melissa for further evaluation  - Strongly encouraged childbirth education classes to review all options for labor and birth. Discussed use of  for extra labor support for birthing process.  - Return to clinic in 4 weeks and prn if questions or concerns.   ANANT Dinh CNM  "

## 2019-11-19 NOTE — LETTER
"2019       RE: Deb Olivera  [address]  [facility] [address]     Dear Colleague,    Thank you for referring your patient, Deb Olivera, to the [facility] at [facility]. Please see a copy of my visit note below.    Subjective:     34 year old  at 19w2d presents for a routine prenatal appointment.      Denies vaginal bleeding or leakage of fluid.  Denies contractions or cramping.  Slight fetal movement.       No HA, visual changes, RUQ or epigastric pain.   Level II US  Results reviewed normal scan.   Offered AFP, declined  The patient presents with the following concerns:    - Reports occasional episodes of heart palpitations, feeling out of breath at top of stairs, and stars in vision unrelated to position change. Symptoms do not occur all at same time.  Denies headaches, chest pain, SOB at rest.   - Reports continued anxiety about the birthing process and the unknown and possible discomfort. Would like to avoid epidural if possible.     - Questions about  monitoring systems (alicia)     Objective:  Vitals:    19 1350   BP: 106/72   Pulse: 101   Weight: 72.6 kg (160 lb)   Height: 1.702 m (5' 7\")     See OB flowsheet    Assessment/Plan:  Encounter Diagnosis   Name Primary?     Encounter for supervision of normal first pregnancy in second trimester Yes     - Reviewed total weight gain, encouraged continued healthy diet and exercise.      - Reviewed why/how to contact provider.   - Patient education/orders or handouts today: PTL signs/symptoms, AFP only and Fetal movement   - Discussed palpitations/changes in vision and possibility of physiologic changes associated with pregnancy, but recommended follow-up with Dr. Melissa for further evaluation  - Strongly encouraged childbirth education classes to review all options for labor and birth. Discussed use of  for extra labor support for birthing process.  - Return to clinic in 4 " weeks and prn if questions or concerns.     Taran Greene, APRN CNM

## 2019-12-05 ENCOUNTER — OFFICE VISIT (OUTPATIENT)
Dept: MATERNAL FETAL MEDICINE | Facility: CLINIC | Age: 34
End: 2019-12-05
Attending: OBSTETRICS & GYNECOLOGY
Payer: COMMERCIAL

## 2019-12-05 ENCOUNTER — HOSPITAL ENCOUNTER (OUTPATIENT)
Dept: ULTRASOUND IMAGING | Facility: CLINIC | Age: 34
Discharge: HOME | End: 2019-12-05
Attending: OBSTETRICS & GYNECOLOGY | Admitting: OBSTETRICS & GYNECOLOGY
Payer: COMMERCIAL

## 2019-12-05 DIAGNOSIS — O35.9XX0 SUSPECTED FETAL ABNORMALITY AFFECTING MANAGEMENT OF MOTHER, SINGLE OR UNSPECIFIED FETUS: Primary | ICD-10-CM

## 2019-12-05 DIAGNOSIS — O26.90 PREGNANCY RELATED CONDITION, ANTEPARTUM: ICD-10-CM

## 2019-12-05 DIAGNOSIS — Z03.73 SUSPECTED FETAL ANOMALY NOT FOUND: ICD-10-CM

## 2019-12-05 PROCEDURE — 76816 OB US FOLLOW-UP PER FETUS: CPT

## 2019-12-05 NOTE — PROGRESS NOTES
Please refer to ultrasound report under 'Imaging' Studies of 'Chart Review' tabs.    Steven Lim M.D.

## 2019-12-11 ENCOUNTER — OFFICE VISIT (OUTPATIENT)
Dept: INTERNAL MEDICINE | Facility: CLINIC | Age: 34
End: 2019-12-11
Attending: INTERNAL MEDICINE
Payer: COMMERCIAL

## 2019-12-11 VITALS
BODY MASS INDEX: 25.84 KG/M2 | DIASTOLIC BLOOD PRESSURE: 75 MMHG | SYSTOLIC BLOOD PRESSURE: 124 MMHG | HEART RATE: 108 BPM | WEIGHT: 165 LBS

## 2019-12-11 DIAGNOSIS — R00.2 PALPITATIONS: Primary | ICD-10-CM

## 2019-12-11 PROCEDURE — 93010 ELECTROCARDIOGRAM REPORT: CPT | Performed by: INTERNAL MEDICINE

## 2019-12-11 PROCEDURE — 93005 ELECTROCARDIOGRAM TRACING: CPT | Mod: ZF | Performed by: INTERNAL MEDICINE

## 2019-12-11 PROCEDURE — G0463 HOSPITAL OUTPT CLINIC VISIT: HCPCS | Mod: ZF

## 2019-12-11 ASSESSMENT — PAIN SCALES - GENERAL: PAINLEVEL: NO PAIN (0)

## 2019-12-11 NOTE — LETTER
12/11/2019       RE: Deb Olivera  [address]  [facility] [address]     Dear Colleague,    Thank you for referring your patient, Deb Olviera, to the [facility]  at [facility]. Please see a copy of my visit note below.    HPI  Patient is here for evaluation of palpitations. She reports that she has had occasional palpitations prior to pregnancy. She states that for the last month she has been having frequent episodes of palpitations. She states that she does not feel dizzy or lightheaded. She states that she had a few episodes of palpitations with physical activity. She states that palpitations are not very frequent.     Review of Systems     Constitutional:  Negative for fever, chills and fatigue.   HENT:  Negative for tinnitus and sinus congestion.    Eyes:  Negative for decreased vision.   Respiratory:   Negative for cough and dyspnea on exertion.    Cardiovascular:  Positive for palpitations. Negative for chest pain, dyspnea on exertion and edema.   Gastrointestinal:  Negative for nausea, abdominal pain, diarrhea and constipation.   Genitourinary:  Negative for dysuria.   Musculoskeletal:  Negative for back pain.   Skin:  Negative for itching.   Endo/Heme:  Negative for anemia, swollen glands and bruises/bleeds easily.   Psychiatric/Behavioral:  Negative for depression, decreased concentration, mood swings and panic attacks.    Endocrine:  Negative for altered temperature regulation, polyphagia, polydipsia, unwanted hair growth and change in facial hair.    Current Outpatient Medications   Medication     Prenatal Vit-Fe Fumarate-FA (PRENATAL COMPLETE PO)     No current facility-administered medications for this visit.      Past Medical History:   Diagnosis Date     Hx of abnormal pap 04/2014    LSIL, colpo CIN1     Past Surgical History:   Procedure Laterality Date     ESOPHAGOSCOPY, GASTROSCOPY, DUODENOSCOPY (EGD), COMBINED N/A 1/7/2019     Procedure: COMBINED ESOPHAGOSCOPY, GASTROSCOPY, DUODENOSCOPY (EGD);  Surgeon: Vinay Childers MD;  Location:  GI     Family History   Problem Relation Age of Onset     Diabetes Paternal Grandfather      Other Cancer Paternal Grandfather      Lipids Father      Other Cancer Father         testicular     Hypertension Father      Other Cancer Maternal Grandmother         brain cancer     Lung Cancer Paternal Grandmother      Social History     Socioeconomic History     Marital status:      Spouse name: Elan      Number of children: 0     Years of education: Not on file     Highest education level: Not on file   Occupational History     Occupation:       Employer: [employer]     Comment: FT, business/real estate   Social Needs     Financial resource strain: Not on file     Food insecurity:     Worry: Not on file     Inability: Not on file     Transportation needs:     Medical: Not on file     Non-medical: Not on file   Tobacco Use     Smoking status: Former Smoker     Packs/day: 0.00     Years: 2.00     Pack years: 0.00     Types: Cigarettes     Smokeless tobacco: Never Used     Tobacco comment: when 19   Substance and Sexual Activity     Alcohol use: Not Currently     Comment: occ     Drug use: No     Sexual activity: Yes     Partners: Male     Birth control/protection: None   Lifestyle     Physical activity:     Days per week: Not on file     Minutes per session: Not on file     Stress: Not on file   Relationships     Social connections:     Talks on phone: Not on file     Gets together: Not on file     Attends Mormonism service: Not on file     Active member of club or organization: Not on file     Attends meetings of clubs or organizations: Not on file     Relationship status: Not on file     Intimate partner violence:     Fear of current or ex partner: Not on file     Emotionally abused: Not on file     Physically abused: Not on file     Forced sexual activity: Not on file   Other Topics  Concern     Parent/sibling w/ CABG, MI or angioplasty before 65F 55M? Not Asked   Social History Narrative     Not on file       Vitals:    12/11/19 1258 12/11/19 1259 12/11/19 1300   BP: 112/74 121/80 124/75   Pulse: 108 108 108   Weight: 74.8 kg (165 lb)       Physical Exam  Vitals signs and nursing note reviewed.   Constitutional:       Appearance: Normal appearance.   HENT:      Head: Normocephalic and atraumatic.      Mouth/Throat:      Mouth: Mucous membranes are moist.   Neck:      Musculoskeletal: Normal range of motion and neck supple.   Cardiovascular:      Rate and Rhythm: Normal rate and regular rhythm.      Heart sounds: No murmur. No gallop.    Pulmonary:      Effort: Pulmonary effort is normal.      Breath sounds: Normal breath sounds.   Musculoskeletal:         General: No edema.   Neurological:      Mental Status: She is alert.         Assessment and Plan:  Deb was seen today for follow up.    Diagnoses and all orders for this visit:    Palpitations.  Reviewed causes of palpitations with patient.  EKG was obtained today, per my read: Normal sinus rhythm, normal EKG, normal intervals.  Recommend further evaluation with CO patch to determine the rhythm associated with palpitations.  Patient was also instructed to check TSH at the next blood draw.  She expressed understanding and agreement with the plan.  -     Cancel: TSH with free T4 reflex  -     EKG 12-lead complete w/read - Clinics  -     Zio Patch Holter Adult Pediatric Greater than 48 hrs; Future  -     TSH with free T4 reflex; Future      Total time spent 45  minutes.  More than 50% of the time spent with Ms. Olivera on counseling / coordinating her care    Giselle Melissa MD

## 2019-12-11 NOTE — PROGRESS NOTES
HPI  Patient is here for evaluation of palpitations. She reports that she has had occasional palpitations prior to pregnancy. She states that for the last month she has been having frequent episodes of palpitations. She states that she does not feel dizzy or lightheaded. She states that she had a few episodes of palpitations with physical activity. She states that palpitations are not very frequent.     Review of Systems     Constitutional:  Negative for fever, chills and fatigue.   HENT:  Negative for tinnitus and sinus congestion.    Eyes:  Negative for decreased vision.   Respiratory:   Negative for cough and dyspnea on exertion.    Cardiovascular:  Positive for palpitations. Negative for chest pain, dyspnea on exertion and edema.   Gastrointestinal:  Negative for nausea, abdominal pain, diarrhea and constipation.   Genitourinary:  Negative for dysuria.   Musculoskeletal:  Negative for back pain.   Skin:  Negative for itching.   Endo/Heme:  Negative for anemia, swollen glands and bruises/bleeds easily.   Psychiatric/Behavioral:  Negative for depression, decreased concentration, mood swings and panic attacks.    Endocrine:  Negative for altered temperature regulation, polyphagia, polydipsia, unwanted hair growth and change in facial hair.    Current Outpatient Medications   Medication     Prenatal Vit-Fe Fumarate-FA (PRENATAL COMPLETE PO)     No current facility-administered medications for this visit.      Past Medical History:   Diagnosis Date     Hx of abnormal pap 04/2014    LSIL, colpo CIN1     Past Surgical History:   Procedure Laterality Date     ESOPHAGOSCOPY, GASTROSCOPY, DUODENOSCOPY (EGD), COMBINED N/A 1/7/2019    Procedure: COMBINED ESOPHAGOSCOPY, GASTROSCOPY, DUODENOSCOPY (EGD);  Surgeon: Vinay Childers MD;  Location:  GI     Family History   Problem Relation Age of Onset     Diabetes Paternal Grandfather      Other Cancer Paternal Grandfather      Lipids Father      Other Cancer Father          testicular     Hypertension Father      Other Cancer Maternal Grandmother         brain cancer     Lung Cancer Paternal Grandmother      Social History     Socioeconomic History     Marital status:      Spouse name: Elan      Number of children: 0     Years of education: Not on file     Highest education level: Not on file   Occupational History     Occupation:       Employer: [employer]     Comment: FT, business/real estate   Social Needs     Financial resource strain: Not on file     Food insecurity:     Worry: Not on file     Inability: Not on file     Transportation needs:     Medical: Not on file     Non-medical: Not on file   Tobacco Use     Smoking status: Former Smoker     Packs/day: 0.00     Years: 2.00     Pack years: 0.00     Types: Cigarettes     Smokeless tobacco: Never Used     Tobacco comment: when 19   Substance and Sexual Activity     Alcohol use: Not Currently     Comment: occ     Drug use: No     Sexual activity: Yes     Partners: Male     Birth control/protection: None   Lifestyle     Physical activity:     Days per week: Not on file     Minutes per session: Not on file     Stress: Not on file   Relationships     Social connections:     Talks on phone: Not on file     Gets together: Not on file     Attends Jainism service: Not on file     Active member of club or organization: Not on file     Attends meetings of clubs or organizations: Not on file     Relationship status: Not on file     Intimate partner violence:     Fear of current or ex partner: Not on file     Emotionally abused: Not on file     Physically abused: Not on file     Forced sexual activity: Not on file   Other Topics Concern     Parent/sibling w/ CABG, MI or angioplasty before 65F 55M? Not Asked   Social History Narrative     Not on file       Vitals:    12/11/19 1258 12/11/19 1259 12/11/19 1300   BP: 112/74 121/80 124/75   Pulse: 108 108 108   Weight: 74.8 kg (165 lb)       Physical Exam  Vitals signs and  nursing note reviewed.   Constitutional:       Appearance: Normal appearance.   HENT:      Head: Normocephalic and atraumatic.      Mouth/Throat:      Mouth: Mucous membranes are moist.   Neck:      Musculoskeletal: Normal range of motion and neck supple.   Cardiovascular:      Rate and Rhythm: Normal rate and regular rhythm.      Heart sounds: No murmur. No gallop.    Pulmonary:      Effort: Pulmonary effort is normal.      Breath sounds: Normal breath sounds.   Musculoskeletal:         General: No edema.   Neurological:      Mental Status: She is alert.         Assessment and Plan:  Deb was seen today for follow up.    Diagnoses and all orders for this visit:    Palpitations.  Reviewed causes of palpitations with patient.  EKG was obtained today, per my read: Normal sinus rhythm, normal EKG, normal intervals.  Recommend further evaluation with CO patch to determine the rhythm associated with palpitations.  Patient was also instructed to check TSH at the next blood draw.  She expressed understanding and agreement with the plan.  -     Cancel: TSH with free T4 reflex  -     EKG 12-lead complete w/read - Clinics  -     Zio Patch Holter Adult Pediatric Greater than 48 hrs; Future  -     TSH with free T4 reflex; Future      Total time spent 45  minutes.  More than 50% of the time spent with Ms. Olivera on counseling / coordinating her care    Giselle Melissa MD

## 2019-12-11 NOTE — NURSING NOTE
Chief Complaint   Patient presents with     Follow Up     C/O dizziness and heart palpitation   Annalise Garcia LPN

## 2019-12-12 LAB — INTERPRETATION ECG - MUSE: NORMAL

## 2019-12-13 ENCOUNTER — ANCILLARY PROCEDURE (OUTPATIENT)
Dept: CARDIOLOGY | Facility: CLINIC | Age: 34
End: 2019-12-13
Attending: INTERNAL MEDICINE
Payer: COMMERCIAL

## 2019-12-13 DIAGNOSIS — R00.2 PALPITATIONS: ICD-10-CM

## 2019-12-13 PROCEDURE — 0298T ZIO PATCH HOLTER ADULT PEDIATRIC GREATER THAN 48 HRS: CPT | Mod: ZP | Performed by: INTERNAL MEDICINE

## 2019-12-13 PROCEDURE — 0296T ZIO PATCH HOLTER ADULT PEDIATRIC GREATER THAN 48 HRS: CPT | Mod: ZF

## 2019-12-13 NOTE — PROGRESS NOTES
Per Giselle Jara, patient to have 7 day zio patch monitor placed.  Diagnosis: palpitation  Monitor placed: Yes  Patient Instructed: Yes  Patient verbalized understanding: Yes  Holter # [id number]  Placed By: Brunilda Johnson MA

## 2019-12-15 ASSESSMENT — ENCOUNTER SYMPTOMS
BACK PAIN: 0
BRUISES/BLEEDS EASILY: 0
NERVOUS/ANXIOUS: 0
DYSPNEA ON EXERTION: 0
COUGH: 0
PALPITATIONS: 1
ALTERED TEMPERATURE REGULATION: 0
SWOLLEN GLANDS: 0
POLYDIPSIA: 0
CHILLS: 0
PANIC: 0
DECREASED CONCENTRATION: 0
FATIGUE: 0
CONSTIPATION: 0
DIARRHEA: 0
SINUS CONGESTION: 0
ABDOMINAL PAIN: 0
DYSURIA: 0
FEVER: 0
NAUSEA: 0
DEPRESSION: 0
POLYPHAGIA: 0
INSOMNIA: 0

## 2019-12-18 ASSESSMENT — ANXIETY QUESTIONNAIRES
2. NOT BEING ABLE TO STOP OR CONTROL WORRYING: SEVERAL DAYS
GAD7 TOTAL SCORE: 6
3. WORRYING TOO MUCH ABOUT DIFFERENT THINGS: SEVERAL DAYS
7. FEELING AFRAID AS IF SOMETHING AWFUL MIGHT HAPPEN: SEVERAL DAYS
4. TROUBLE RELAXING: SEVERAL DAYS
7. FEELING AFRAID AS IF SOMETHING AWFUL MIGHT HAPPEN: SEVERAL DAYS
GAD7 TOTAL SCORE: 6
1. FEELING NERVOUS, ANXIOUS, OR ON EDGE: SEVERAL DAYS
6. BECOMING EASILY ANNOYED OR IRRITABLE: SEVERAL DAYS
5. BEING SO RESTLESS THAT IT IS HARD TO SIT STILL: NOT AT ALL

## 2019-12-19 ENCOUNTER — OFFICE VISIT (OUTPATIENT)
Dept: OBGYN | Facility: CLINIC | Age: 34
End: 2019-12-19
Attending: ADVANCED PRACTICE MIDWIFE
Payer: COMMERCIAL

## 2019-12-19 VITALS
HEIGHT: 67 IN | WEIGHT: 164 LBS | DIASTOLIC BLOOD PRESSURE: 69 MMHG | BODY MASS INDEX: 25.74 KG/M2 | HEART RATE: 99 BPM | SYSTOLIC BLOOD PRESSURE: 108 MMHG

## 2019-12-19 DIAGNOSIS — Z34.02 ENCOUNTER FOR SUPERVISION OF NORMAL FIRST PREGNANCY IN SECOND TRIMESTER: Primary | ICD-10-CM

## 2019-12-19 PROCEDURE — G0463 HOSPITAL OUTPT CLINIC VISIT: HCPCS | Mod: ZF

## 2019-12-19 ASSESSMENT — ANXIETY QUESTIONNAIRES: GAD7 TOTAL SCORE: 6

## 2019-12-19 ASSESSMENT — PAIN SCALES - GENERAL: PAINLEVEL: NO PAIN (0)

## 2019-12-19 ASSESSMENT — MIFFLIN-ST. JEOR: SCORE: 1481.65

## 2019-12-19 NOTE — LETTER
"2019       RE: Deb Olivera  [address]  [facility] [address]     Dear Colleague,    Thank you for referring your patient, Deb Olivera, to the [facility] at [facility]. Please see a copy of my visit note below.    Subjective:      34 year old  at 23w4d presents for a routine prenatal appointment.       Denies vaginal bleeding or leakage of fluid.  Denies contractions or cramping.    reports regular fetal movement.       No HA, visual changes, RUQ or epigastric pain.   The patient presents with the following concerns: injured low back shoveling snow, comfort measures discussed.        Objective:  Vitals:    19 0856   BP: 108/69   Pulse: 99   Weight: 74.4 kg (164 lb)   Height: 1.702 m (5' 7.01\")   See OB flowsheet    Assessment/Plan  Encounter Diagnosis   Name Primary?     Encounter for supervision of normal first pregnancy in second trimester Yes     - Reviewed total weight gain, encouraged continued healthy diet and exercise.      - Reviewed why/how to contact provider.    Patient education/orders or handouts today:  fetal movement and Plan for EOB visit w labs   Return to clinic in 4 weeks and prn if questions or concerns.     ANANT Olivia CNM                    "

## 2019-12-19 NOTE — PROGRESS NOTES
"Subjective:      34 year old  at 23w4d presents for a routine prenatal appointment.       Denies vaginal bleeding or leakage of fluid.  Denies contractions or cramping.    reports regular fetal movement.       No HA, visual changes, RUQ or epigastric pain.   The patient presents with the following concerns: injured low back shoveling snow, comfort measures discussed.        Objective:  Vitals:    19 0856   BP: 108/69   Pulse: 99   Weight: 74.4 kg (164 lb)   Height: 1.702 m (5' 7.01\")     See OB flowsheet    Assessment/Plan     Encounter Diagnosis   Name Primary?     Encounter for supervision of normal first pregnancy in second trimester Yes       - Reviewed total weight gain, encouraged continued healthy diet and exercise.      - Reviewed why/how to contact provider.    Patient education/orders or handouts today:  fetal movement and Plan for EOB visit w labs   Return to clinic in 4 weeks and prn if questions or concerns.   ANANT Olivia CNM              "

## 2020-01-15 ENCOUNTER — TELEPHONE (OUTPATIENT)
Dept: INTERNAL MEDICINE | Facility: CLINIC | Age: 35
End: 2020-01-15

## 2020-01-15 DIAGNOSIS — I47.29 PAROXYSMAL VENTRICULAR TACHYCARDIA (H): Primary | ICD-10-CM

## 2020-01-15 NOTE — TELEPHONE ENCOUNTER
Ventricular tachycardia was detected, recommend Cardiology evaluation and echocardiogram.   Discussed with patient, she is in agreement with the plan.   Giselle Melissa MD

## 2020-01-16 ASSESSMENT — ANXIETY QUESTIONNAIRES
1. FEELING NERVOUS, ANXIOUS, OR ON EDGE: SEVERAL DAYS
6. BECOMING EASILY ANNOYED OR IRRITABLE: NOT AT ALL
GAD7 TOTAL SCORE: 4
GAD7 TOTAL SCORE: 4
2. NOT BEING ABLE TO STOP OR CONTROL WORRYING: NOT AT ALL
3. WORRYING TOO MUCH ABOUT DIFFERENT THINGS: SEVERAL DAYS
7. FEELING AFRAID AS IF SOMETHING AWFUL MIGHT HAPPEN: SEVERAL DAYS
7. FEELING AFRAID AS IF SOMETHING AWFUL MIGHT HAPPEN: SEVERAL DAYS
4. TROUBLE RELAXING: SEVERAL DAYS
5. BEING SO RESTLESS THAT IT IS HARD TO SIT STILL: NOT AT ALL

## 2020-01-20 ENCOUNTER — ANCILLARY PROCEDURE (OUTPATIENT)
Dept: CARDIOLOGY | Facility: CLINIC | Age: 35
End: 2020-01-20
Attending: INTERNAL MEDICINE
Payer: COMMERCIAL

## 2020-01-20 ENCOUNTER — OFFICE VISIT (OUTPATIENT)
Dept: OBGYN | Facility: CLINIC | Age: 35
End: 2020-01-20
Attending: ADVANCED PRACTICE MIDWIFE
Payer: COMMERCIAL

## 2020-01-20 VITALS
HEIGHT: 67 IN | SYSTOLIC BLOOD PRESSURE: 98 MMHG | WEIGHT: 166.4 LBS | DIASTOLIC BLOOD PRESSURE: 63 MMHG | BODY MASS INDEX: 26.12 KG/M2 | HEART RATE: 108 BPM

## 2020-01-20 DIAGNOSIS — I47.29 PAROXYSMAL VENTRICULAR TACHYCARDIA (H): ICD-10-CM

## 2020-01-20 DIAGNOSIS — Z34.02 ENCOUNTER FOR SUPERVISION OF NORMAL FIRST PREGNANCY IN SECOND TRIMESTER: Primary | ICD-10-CM

## 2020-01-20 LAB
DEPRECATED CALCIDIOL+CALCIFEROL SERPL-MC: 18 UG/L (ref 20–75)
ERYTHROCYTE [DISTWIDTH] IN BLOOD BY AUTOMATED COUNT: 12.6 % (ref 10–15)
GLUCOSE 1H P 50 G GLC PO SERPL-MCNC: 108 MG/DL (ref 60–129)
HCT VFR BLD AUTO: 33.4 % (ref 35–47)
HGB BLD-MCNC: 11 G/DL (ref 11.7–15.7)
MCH RBC QN AUTO: 31.9 PG (ref 26.5–33)
MCHC RBC AUTO-ENTMCNC: 32.9 G/DL (ref 31.5–36.5)
MCV RBC AUTO: 97 FL (ref 78–100)
PLATELET # BLD AUTO: 293 10E9/L (ref 150–450)
RBC # BLD AUTO: 3.45 10E12/L (ref 3.8–5.2)
T PALLIDUM AB SER QL: NONREACTIVE
WBC # BLD AUTO: 13.9 10E9/L (ref 4–11)

## 2020-01-20 PROCEDURE — G0463 HOSPITAL OUTPT CLINIC VISIT: HCPCS | Mod: 25,ZF

## 2020-01-20 PROCEDURE — 36415 COLL VENOUS BLD VENIPUNCTURE: CPT | Performed by: ADVANCED PRACTICE MIDWIFE

## 2020-01-20 PROCEDURE — 25000128 H RX IP 250 OP 636: Mod: ZF

## 2020-01-20 PROCEDURE — 90715 TDAP VACCINE 7 YRS/> IM: CPT | Mod: ZF

## 2020-01-20 PROCEDURE — 86780 TREPONEMA PALLIDUM: CPT | Performed by: ADVANCED PRACTICE MIDWIFE

## 2020-01-20 PROCEDURE — 82306 VITAMIN D 25 HYDROXY: CPT | Performed by: ADVANCED PRACTICE MIDWIFE

## 2020-01-20 PROCEDURE — 85027 COMPLETE CBC AUTOMATED: CPT | Performed by: ADVANCED PRACTICE MIDWIFE

## 2020-01-20 PROCEDURE — 90471 IMMUNIZATION ADMIN: CPT | Mod: ZF

## 2020-01-20 PROCEDURE — 82950 GLUCOSE TEST: CPT | Performed by: ADVANCED PRACTICE MIDWIFE

## 2020-01-20 ASSESSMENT — PAIN SCALES - GENERAL: PAINLEVEL: NO PAIN (0)

## 2020-01-20 ASSESSMENT — ANXIETY QUESTIONNAIRES
5. BEING SO RESTLESS THAT IT IS HARD TO SIT STILL: NOT AT ALL
7. FEELING AFRAID AS IF SOMETHING AWFUL MIGHT HAPPEN: NOT AT ALL
6. BECOMING EASILY ANNOYED OR IRRITABLE: NOT AT ALL
GAD7 TOTAL SCORE: 0
2. NOT BEING ABLE TO STOP OR CONTROL WORRYING: NOT AT ALL
1. FEELING NERVOUS, ANXIOUS, OR ON EDGE: NOT AT ALL
3. WORRYING TOO MUCH ABOUT DIFFERENT THINGS: NOT AT ALL

## 2020-01-20 ASSESSMENT — MIFFLIN-ST. JEOR: SCORE: 1492.42

## 2020-01-20 ASSESSMENT — PATIENT HEALTH QUESTIONNAIRE - PHQ9
SUM OF ALL RESPONSES TO PHQ QUESTIONS 1-9: 0
5. POOR APPETITE OR OVEREATING: NOT AT ALL

## 2020-01-20 NOTE — LETTER
"2020       RE: Deb Olivera  [address]  [facility] [address]     Dear Colleague,    Thank you for referring your patient, Deb Olivera, to the [facility] at [facility]. Please see a copy of my visit note below.     34 year old, , 28w1d, denies LOF, denies vaginal bleeding, endorses fetal movement. Presents with her  for an EOB visit.  The patient presents with the following concerns: Had some episodes of dizziness and vertigo for which she saw Belén and did the Ziopatch monitor. She was told the results were \"not good\" during a brief call and has been extremely anxious since. Has appt with a cardiologist for evaluation today at 5pm.  Submits this finding has caused an increase in her anxiety (which is an ongoing issue) and she has been doing more MBSR, prenatal yoga, and meditation for her anxiety. Is meeting with some doulas this week.     PHQ-9 SCORE 9/10/2019 2020   PHQ-9 Total Score 3 0     Education completed today includes breast feeding, [facility] hand out , contraception, counting movements, signs of pre-term labor, when to present to birthplace, post partum depression, GBS, getting enough iron and labor induction.  Birth preferences reviewed: Medicated, Un-Medicated, Water birth, : looking for and Feeding preference  Labor support:   and     Feeding plans :    Contraception planned:  mini pill / progesterone only pill  The following labs were ordered today:       GCT, CBC w platelets, Vitamin D and Anti-treponema  Water birth consent form was given, just for consideration.    Blood type:   ABO   Date Value Ref Range Status   09/10/2019 O  Final     RH(D)   Date Value Ref Range Status   09/10/2019 Pos  Final     Antibody Screen   Date Value Ref Range Status   09/10/2019 Neg  Final   Rhogam  was not given.  TDAP  was given.    A/P:  Encounter Diagnosis   Name Primary?     Encounter for " supervision of normal first pregnancy in second trimester Yes     Orders Placed This Encounter   Procedures     TDAP VACCINE (BOOSTRIX)     25- OH-Vitamin D     Treponema Abs w Reflex to RPR and Titer     Glucose 1 Hour     CBC with platelets     Apologized for lack of communication around cardiac monitoring results, reassured that if there were a serious issue we would have had her come in immediately.   RTC in 2 wks to follow-up on her cardiac monitoring.   Will review water birth consent and make a decision.    Continue scheduled prenatal care.    Eden Llanes CNM    Answers for HPI/ROS submitted by the patient on 1/16/2020   BRADLEY 7 TOTAL SCORE: 4

## 2020-01-20 NOTE — PROGRESS NOTES
" 34 year old, , 28w1d, denies LOF, denies vaginal bleeding, endorses fetal movement. Presents with her  for an EOB visit.  The patient presents with the following concerns: Had some episodes of dizziness and vertigo for which she saw Belén and did the Ziopatch monitor. She was told the results were \"not good\" during a brief call and has been extremely anxious since. Has appt with a cardiologist for evaluation today at 5pm.  Submits this finding has caused an increase in her anxiety (which is an ongoing issue) and she has been doing more MBSR, prenatal yoga, and meditation for her anxiety. Is meeting with some doulas this week.     PHQ-9 SCORE 9/10/2019 2020   PHQ-9 Total Score 3 0     Education completed today includes breast feeding, [facility] hand out , contraception, counting movements, signs of pre-term labor, when to present to birthplace, post partum depression, GBS, getting enough iron and labor induction.  Birth preferences reviewed: Medicated, Un-Medicated, Water birth, : looking for and Feeding preference  Labor support:   and     Feeding plans :    Contraception planned:  mini pill / progesterone only pill  The following labs were ordered today:       GCT, CBC w platelets, Vitamin D and Anti-treponema  Water birth consent form was given, just for consideration.    Blood type:   ABO   Date Value Ref Range Status   09/10/2019 O  Final     RH(D)   Date Value Ref Range Status   09/10/2019 Pos  Final     Antibody Screen   Date Value Ref Range Status   09/10/2019 Neg  Final   Rhogam  was not given.  TDAP  was given.    A/P:  Encounter Diagnosis   Name Primary?     Encounter for supervision of normal first pregnancy in second trimester Yes     Orders Placed This Encounter   Procedures     TDAP VACCINE (BOOSTRIX)     25- OH-Vitamin D     Treponema Abs w Reflex to RPR and Titer     Glucose 1 Hour     CBC with platelets     Apologized for lack of communication around " cardiac monitoring results, reassured that if there were a serious issue we would have had her come in immediately.   RTC in 2 wks to follow-up on her cardiac monitoring.   Will review water birth consent and make a decision.    Continue scheduled prenatal care.      Eden Llanes CNM      Answers for HPI/ROS submitted by the patient on 1/16/2020   BRADLEY 7 TOTAL SCORE: 4

## 2020-01-21 ENCOUNTER — PRE VISIT (OUTPATIENT)
Dept: CARDIOLOGY | Facility: CLINIC | Age: 35
End: 2020-01-21

## 2020-01-21 NOTE — TELEPHONE ENCOUNTER
PREVISIT INFORMATION                                                    Deb Olivera scheduled for future visit at [facility] specialty clinics.    Patient is scheduled to see [facility] on 1/23/20  Reason for visit: Monitor results  Referring provider Giselle Melissa  Has patient seen previous specialist? No  Medical Records:  Available in chart.  Patient was previously seen at a [place] or [facility] facility.    REVIEW                                                      New patient packet mailed to patient: No  Medication reconciliation complete: Yes      Current Outpatient Medications   Medication Sig Dispense Refill     Prenatal Vit-Fe Fumarate-FA (PRENATAL COMPLETE PO)          Allergies: Patient has no known allergies.        PLAN/FOLLOW-UP NEEDED                                                      Previsit review complete.  Patient will see provider at future scheduled appointment.     Patient Reminders Given:  Clinic location reviewed.     Mallorie Chen RN

## 2020-01-23 ENCOUNTER — OFFICE VISIT (OUTPATIENT)
Dept: CARDIOLOGY | Facility: CLINIC | Age: 35
End: 2020-01-23
Attending: INTERNAL MEDICINE
Payer: COMMERCIAL

## 2020-01-23 VITALS
DIASTOLIC BLOOD PRESSURE: 64 MMHG | BODY MASS INDEX: 26.29 KG/M2 | HEART RATE: 108 BPM | HEIGHT: 67 IN | SYSTOLIC BLOOD PRESSURE: 118 MMHG | WEIGHT: 167.5 LBS

## 2020-01-23 DIAGNOSIS — R00.2 PALPITATIONS: Primary | ICD-10-CM

## 2020-01-23 LAB
ANION GAP SERPL CALCULATED.3IONS-SCNC: 6 MMOL/L (ref 3–14)
BUN SERPL-MCNC: 7 MG/DL (ref 7–30)
CALCIUM SERPL-MCNC: 8.4 MG/DL (ref 8.5–10.1)
CHLORIDE SERPL-SCNC: 108 MMOL/L (ref 94–109)
CO2 SERPL-SCNC: 24 MMOL/L (ref 20–32)
CREAT SERPL-MCNC: 0.52 MG/DL (ref 0.52–1.04)
GFR SERPL CREATININE-BSD FRML MDRD: >90 ML/MIN/{1.73_M2}
GLUCOSE SERPL-MCNC: 85 MG/DL (ref 70–99)
POTASSIUM SERPL-SCNC: 3.9 MMOL/L (ref 3.4–5.3)
SODIUM SERPL-SCNC: 138 MMOL/L (ref 133–144)
TSH SERPL DL<=0.005 MIU/L-ACNC: 0.69 MU/L (ref 0.4–4)

## 2020-01-23 PROCEDURE — 36415 COLL VENOUS BLD VENIPUNCTURE: CPT | Performed by: INTERNAL MEDICINE

## 2020-01-23 PROCEDURE — 99203 OFFICE O/P NEW LOW 30 MIN: CPT | Performed by: INTERNAL MEDICINE

## 2020-01-23 PROCEDURE — 80048 BASIC METABOLIC PNL TOTAL CA: CPT | Performed by: INTERNAL MEDICINE

## 2020-01-23 PROCEDURE — 84443 ASSAY THYROID STIM HORMONE: CPT | Performed by: INTERNAL MEDICINE

## 2020-01-23 ASSESSMENT — MIFFLIN-ST. JEOR: SCORE: 1497.41

## 2020-01-23 ASSESSMENT — PAIN SCALES - GENERAL: PAINLEVEL: NO PAIN (0)

## 2020-01-23 NOTE — NURSING NOTE
"Deb Olivera's goals for this visit include:   Chief Complaint   Patient presents with     Consult     tachycardia       She requests these members of her care team be copied on today's visit information: no    PCP: Kristan Bailey    Referring Provider:  Giselle Melissa MD  [address] [place] [address]    /64 (BP Location: Left arm, Patient Position: Sitting, Cuff Size: Adult Regular)   Pulse 108   Ht 1.702 m (5' 7\")   Wt 76 kg (167 lb 8 oz)   LMP 06/22/2019 (Approximate)   BMI 26.23 kg/m      Do you need any medication refills at today's visit? no    "

## 2020-01-23 NOTE — PROGRESS NOTES
"2020             Kristan Bailey MD   [facility]    [address] [place] [address]      Patient:  Deb Olivera   MRN:  [id number]   :  1985      Dear Dr. Bailey:      It was a pleasure participating in the care of your patient, Ms. Deb Olivera.  As you know, she is a 34-year-old pregnant lady who I see today for palpitations.      Her past medical history is significant for the followin.  Migraine headaches.   2.  Anxiety with panic attacks, last on medications in  for a couple of years.      She denies other significant hospitalizations or surgeries.      She denies having a significant history of cardiac disease.      In terms of her present symptom complex, this is her first pregnancy and she is 28 weeks pregnant.  She feels that she can feel her heart skip a beat, and it will be followed by a strong beat after a second or 2.  It lasts for a few seconds and she noticed it every few days back in December.  She has not had any in January.  The palpitations do not make her dizzy, lightheaded, syncopal or near-syncopal.  They just make her feel like she has to catch her breath.  She notices it more when she is quiet and lying down and less so when she is active.  It gets worse with caffeine.      She is active.  She does yoga and works out on the treadmill for 45 minutes without symptoms.  She does a bar class of ballet.        She otherwise denies any, PND, orthopnea, edema, syncope or near-syncope.  She denies jacque chest pain or shortness of breath.      In terms of her cardiac risk factors, no history of diabetes or hypertension.  She quit smoking.  She smoked for a year when she was 19, less than a pack a day.  Social drinker, 3-4 drinks 3-4 times a week.  Drinks half a cup of coffee a day.  Denies family history of heart disease.  Cholesterol is \"okay.\"      She is an  for commercial real estate.      CURRENT MEDICATIONS:  Prenatal " vitamins.      PHYSICAL EXAMINATION:     VITAL SIGNS:  Blood pressure is 118/64 with a pulse of 100.  Her weight is 167 pounds.   NECK:  Exam reveals normal jugular venous pressure.   LUNGS:  Clear to auscultation.  Respiratory effort is normal.   CARDIAC:  Reveals a regular rate and rhythm.  No obvious murmur or gallop appreciated.   ABDOMEN:  Belly soft, nontender.   EXTREMITIES:  Without gross edema.      Her EKG on 12/11/2019 reveals normal sinus rhythm at a rate of 84 beats per minute, no gross ST changes.  ZIO Patch monitor 12/13/2019 reveals rare ectopy, one 5-beat run of nonsustained VT that correlated with symptoms.  The other symptoms correlated with supraventricular and ventricular ectopics of low burden.  Echocardiogram 01/20/2020 reveals an ejection fraction of 60% to 65% without gross valvular pathology.        IMPRESSION:      Deb is a 34-year-old lady who is 28 weeks pregnant who is experiencing occasional palpitations.  The palpitations are described as a skip followed by a strong beat for a few seconds every few days in December that make her catch her breath, not associated with any dizziness, lightheadedness, syncope or near-syncope.  They are worse with caffeine and she notices them more when she is quiet, less so when she is active.      She has a structurally normal heart by echo 01/20/2020 and her ZIO Patch monitor reveals that her symptoms correspond to supraventricular and ventricular ectopic beats of low burden without sustained runs.  There was a short 5-beat run of nonsustained VT that correlated with symptoms incidentally noted as well.      We discussed her findings and she was quite reassured.        PLAN:     1.  At this point in time, no further treatment or workup would be indicated.  Lifestyle modification to avoid caffeine and alcohol in the future would be certainly prudent.     2.  As a precaution, she could certainly consider having a heart monitor in place during delivery,  but otherwise no additional medical therapy or intervention would be currently indicated.     3.  We will check a Chem-7 and TSH to rule out metabolic abnormalities.      Once again, it was a pleasure participating in the care of your patient, Ms. Negin Johnson.  Please feel free to contact me at any time with any questions regarding her care in the future.         Sincerely,      NOHELIA RUIZ MD             D: 2020   T: 2020   MT: ANIKA      Name:     NEGIN JOHNSON   MRN:      [id number]        Account:      [id number]   :      1985      Document: [id number]       cc: Kristan Melissa MD

## 2020-01-30 ASSESSMENT — ANXIETY QUESTIONNAIRES
7. FEELING AFRAID AS IF SOMETHING AWFUL MIGHT HAPPEN: NOT AT ALL
7. FEELING AFRAID AS IF SOMETHING AWFUL MIGHT HAPPEN: NOT AT ALL
3. WORRYING TOO MUCH ABOUT DIFFERENT THINGS: SEVERAL DAYS
2. NOT BEING ABLE TO STOP OR CONTROL WORRYING: NOT AT ALL
5. BEING SO RESTLESS THAT IT IS HARD TO SIT STILL: NOT AT ALL
1. FEELING NERVOUS, ANXIOUS, OR ON EDGE: SEVERAL DAYS
6. BECOMING EASILY ANNOYED OR IRRITABLE: NOT AT ALL
GAD7 TOTAL SCORE: 3
4. TROUBLE RELAXING: SEVERAL DAYS
GAD7 TOTAL SCORE: 3

## 2020-01-31 ASSESSMENT — ANXIETY QUESTIONNAIRES: GAD7 TOTAL SCORE: 3

## 2020-02-04 ENCOUNTER — OFFICE VISIT (OUTPATIENT)
Dept: OBGYN | Facility: CLINIC | Age: 35
End: 2020-02-04
Attending: ADVANCED PRACTICE MIDWIFE
Payer: COMMERCIAL

## 2020-02-04 VITALS
DIASTOLIC BLOOD PRESSURE: 74 MMHG | HEART RATE: 123 BPM | HEIGHT: 67 IN | SYSTOLIC BLOOD PRESSURE: 121 MMHG | BODY MASS INDEX: 26.82 KG/M2 | WEIGHT: 170.9 LBS

## 2020-02-04 DIAGNOSIS — Z23 NEED FOR PROPHYLACTIC VACCINATION AND INOCULATION AGAINST INFLUENZA: Primary | ICD-10-CM

## 2020-02-04 PROCEDURE — G0463 HOSPITAL OUTPT CLINIC VISIT: HCPCS | Mod: ZF

## 2020-02-04 ASSESSMENT — MIFFLIN-ST. JEOR: SCORE: 1512.83

## 2020-02-04 NOTE — PROGRESS NOTES
"Subjective:      34 year old  at 30w1d presentst for a routine prenatal appointment with her .    Denies vaginal bleeding or leakage of fluid.  Denies contractions. Endorses fetal movement.       No HA, visual changes, RUQ or epigastric pain.   Patient concerns: Next US? Discuss water birth and what to bring to hospital. Considering hypnobirth and  [place] - doing yoga at [facility] and a spinning babies class. Would like to review recommendation from cardiologist. Feeling well overall.  Reviewed EOB labs with patient.  Reviewed TDAP Previously given     Objective:  Vitals:    20 0924   BP: 121/74   BP Location: Left arm   Patient Position: Chair   Pulse: 123   Weight: 77.5 kg (170 lb 14.4 oz)   Height: 1.702 m (5' 7\")   See ob flowsheet    Note from cardiology:    PLAN:      1.  At this point in time, no further treatment or workup would be indicated.  Lifestyle modification to avoid caffeine and alcohol in the future would be certainly prudent.      2.  As a precaution, she could certainly consider having a heart monitor in place during delivery, but otherwise no additional medical therapy or intervention would be currently indicated.      3.  We will check a Chem-7 and TSH to rule out metabolic abnormalities.        Assessment/Plan     Encounter Diagnosis   Name Primary?     Need for prophylactic vaccination and inoculation against influenza Yes     Orders Placed This Encounter   Procedures     TDAP VACCINE (BOOSTRIX)     No orders of the defined types were placed in this encounter.    ABO   Date Value Ref Range Status   09/10/2019 O  Final     RH(D)   Date Value Ref Range Status   09/10/2019 Pos  Final     Antibody Screen   Date Value Ref Range Status   09/10/2019 Neg  Final   Rhogam  was not given.    - Reviewed total weight gain, encouraged continued healthy diet and exercise.  Reviewed importance of daily fetal kick count and why/how to contact provider.    - Reviewed note from " cardiology at length. Given this and their ambivalence about delivering in the tub, water birth consent not signed.    - Reviewed why/how to contact provider if headache/visual changes/RUQ or epigastric pain, decreased fetal movement, vaginal bleeding, leakage of fluid or more than 4 contractions in an hour.     Patient education/orders or handouts today:  PTL signs/symptoms, Childbirth Ed classes and hypnobirthing and spinning babies.   Reviewed GBS screening at 35-36 wks.    Return to clinic in 2 weeks and prn if questions or concerns.     Eden Llanes CNM      Answers for HPI/ROS submitted by the patient on 1/30/2020   BRADLEY 7 TOTAL SCORE: 3

## 2020-02-04 NOTE — LETTER
"2020       RE: Deb Olivera  [address]  [facility] [address]     Dear Colleague,    Thank you for referring your patient, Deb Olivera, to the [facility] at [facility]. Please see a copy of my visit note below.    Subjective:      34 year old  at 30w1d presentst for a routine prenatal appointment with her .    Denies vaginal bleeding or leakage of fluid.  Denies contractions. Endorses fetal movement.       No HA, visual changes, RUQ or epigastric pain.   Patient concerns: Next US? Discuss water birth and what to bring to hospital. Considering hypnobirth and  [place] - doing yoga at [facility] and a spinning babies class. Would like to review recommendation from cardiologist. Feeling well overall.  Reviewed EOB labs with patient.  Reviewed TDAP Previously given     Objective:  Vitals:    20 0924   BP: 121/74   BP Location: Left arm   Patient Position: Chair   Pulse: 123   Weight: 77.5 kg (170 lb 14.4 oz)   Height: 1.702 m (5' 7\")   See ob flowsheet    Note from cardiology:    PLAN:      1.  At this point in time, no further treatment or workup would be indicated.  Lifestyle modification to avoid caffeine and alcohol in the future would be certainly prudent.      2.  As a precaution, she could certainly consider having a heart monitor in place during delivery, but otherwise no additional medical therapy or intervention would be currently indicated.      3.  We will check a Chem-7 and TSH to rule out metabolic abnormalities.        Assessment/Plan     Encounter Diagnosis   Name Primary?     Need for prophylactic vaccination and inoculation against influenza Yes     Orders Placed This Encounter   Procedures     TDAP VACCINE (BOOSTRIX)     No orders of the defined types were placed in this encounter.    ABO   Date Value Ref Range Status   09/10/2019 O  Final     RH(D)   Date Value Ref Range Status   09/10/2019 Pos  Final "     Antibody Screen   Date Value Ref Range Status   09/10/2019 Neg  Final   Rhogam  was not given.    - Reviewed total weight gain, encouraged continued healthy diet and exercise.  Reviewed importance of daily fetal kick count and why/how to contact provider.    - Reviewed note from cardiology at length. Given this and their ambivalence about delivering in the tub, water birth consent not signed.    - Reviewed why/how to contact provider if headache/visual changes/RUQ or epigastric pain, decreased fetal movement, vaginal bleeding, leakage of fluid or more than 4 contractions in an hour.     Patient education/orders or handouts today:  PTL signs/symptoms, Childbirth Ed classes and hypnobirthing and spinning babies.   Reviewed GBS screening at 35-36 wks.    Return to clinic in 2 weeks and prn if questions or concerns.     Eden Llanes CNM      Answers for HPI/ROS submitted by the patient on 1/30/2020   BRADLEY 7 TOTAL SCORE: 3      Again, thank you for allowing me to participate in the care of your patient.      Sincerely,    Eden Llanes CNM

## 2020-02-04 NOTE — LETTER
"2020      RE: Deb Olivera  [address]  [place] [facility] [place] [facility] [address]       Subjective:      34 year old  at 30w1d presentst for a routine prenatal appointment with her .    Denies vaginal bleeding or leakage of fluid.  Denies contractions. Endorses fetal movement.       No HA, visual changes, RUQ or epigastric pain.   Patient concerns: Next US? Discuss water birth and what to bring to hospital. Considering hypnobirth and  [place] - doing yoga at [facility] and a spinning babies class. Would like to review recommendation from cardiologist. Feeling well overall.  Reviewed EOB labs with patient.  Reviewed TDAP Previously given     Objective:  Vitals:    20 0924   BP: 121/74   BP Location: Left arm   Patient Position: Chair   Pulse: 123   Weight: 77.5 kg (170 lb 14.4 oz)   Height: 1.702 m (5' 7\")   See ob flowsheet    Note from cardiology:    PLAN:      1.  At this point in time, no further treatment or workup would be indicated.  Lifestyle modification to avoid caffeine and alcohol in the future would be certainly prudent.      2.  As a precaution, she could certainly consider having a heart monitor in place during delivery, but otherwise no additional medical therapy or intervention would be currently indicated.      3.  We will check a Chem-7 and TSH to rule out metabolic abnormalities.        Assessment/Plan     Encounter Diagnosis   Name Primary?     Need for prophylactic vaccination and inoculation against influenza Yes     Orders Placed This Encounter   Procedures     TDAP VACCINE (BOOSTRIX)     No orders of the defined types were placed in this encounter.    ABO   Date Value Ref Range Status   09/10/2019 O  Final     RH(D)   Date Value Ref Range Status   09/10/2019 Pos  Final     Antibody Screen   Date Value Ref Range Status   09/10/2019 Neg  Final   Rhogam  was not given.    - Reviewed total weight gain, encouraged continued healthy diet and exercise.  Reviewed importance of " daily fetal kick count and why/how to contact provider.    - Reviewed note from cardiology at length. Given this and their ambivalence about delivering in the tub, water birth consent not signed.    - Reviewed why/how to contact provider if headache/visual changes/RUQ or epigastric pain, decreased fetal movement, vaginal bleeding, leakage of fluid or more than 4 contractions in an hour.     Patient education/orders or handouts today:  PTL signs/symptoms, Childbirth Ed classes and hypnobirthing and spinning babies.   Reviewed GBS screening at 35-36 wks.    Return to clinic in 2 weeks and prn if questions or concerns.     Eden Llanes CNM      Answers for HPI/ROS submitted by the patient on 1/30/2020   BRADLEY 7 TOTAL SCORE: 3

## 2020-02-11 ASSESSMENT — ANXIETY QUESTIONNAIRES
7. FEELING AFRAID AS IF SOMETHING AWFUL MIGHT HAPPEN: NOT AT ALL
3. WORRYING TOO MUCH ABOUT DIFFERENT THINGS: SEVERAL DAYS
5. BEING SO RESTLESS THAT IT IS HARD TO SIT STILL: NOT AT ALL
GAD7 TOTAL SCORE: 3
4. TROUBLE RELAXING: SEVERAL DAYS
GAD7 TOTAL SCORE: 3
2. NOT BEING ABLE TO STOP OR CONTROL WORRYING: NOT AT ALL
1. FEELING NERVOUS, ANXIOUS, OR ON EDGE: SEVERAL DAYS
7. FEELING AFRAID AS IF SOMETHING AWFUL MIGHT HAPPEN: NOT AT ALL
6. BECOMING EASILY ANNOYED OR IRRITABLE: NOT AT ALL

## 2020-02-12 ASSESSMENT — ANXIETY QUESTIONNAIRES: GAD7 TOTAL SCORE: 3

## 2020-02-17 ENCOUNTER — OFFICE VISIT (OUTPATIENT)
Dept: OBGYN | Facility: CLINIC | Age: 35
End: 2020-02-17
Attending: ADVANCED PRACTICE MIDWIFE
Payer: COMMERCIAL

## 2020-02-17 VITALS
BODY MASS INDEX: 27.01 KG/M2 | HEIGHT: 67 IN | HEART RATE: 102 BPM | SYSTOLIC BLOOD PRESSURE: 122 MMHG | WEIGHT: 172.1 LBS | DIASTOLIC BLOOD PRESSURE: 75 MMHG

## 2020-02-17 DIAGNOSIS — Z34.02 ENCOUNTER FOR SUPERVISION OF NORMAL FIRST PREGNANCY IN SECOND TRIMESTER: Primary | ICD-10-CM

## 2020-02-17 PROCEDURE — G0463 HOSPITAL OUTPT CLINIC VISIT: HCPCS | Mod: ZF

## 2020-02-17 ASSESSMENT — PAIN SCALES - GENERAL: PAINLEVEL: NO PAIN (0)

## 2020-02-17 ASSESSMENT — MIFFLIN-ST. JEOR: SCORE: 1518.27

## 2020-02-17 NOTE — LETTER
"2020       RE: Deb Olivera  [address]  [facility] [address]     Dear Colleague,    Thank you for referring your patient, Deb Olivera, to the [facility] at [facility]. Please see a copy of my visit note below.    Subjective:      34 year old  at 32w1d presentst for a routine prenatal appointment.    Denies vaginal bleeding or leakage of fluid.  Denies contractions. Endorses fetal movement.       No HA, visual changes, RUQ or epigastric pain.   Reports she is taking vitamin D supplementation 4IU daily.  Patient concerns: wondering about fetal movement. Wondering about lactation support, wondering about first 2 hours postpartum.    Feeling well overall.  Reviewed EOB labs with patient.  Reviewed TDAP Previously given     Objective:  Vitals:    20 1120   BP: 122/75   Pulse: 102   Weight: 78.1 kg (172 lb 1.6 oz)   Height: 1.702 m (5' 7\")   See ob flowsheet    Assessment/Plan     Encounter Diagnosis   Name Primary?     Encounter for supervision of normal first pregnancy in second trimester Yes     No orders of the defined types were placed in this encounter.    Orders Placed This Encounter   Medications     cholecalciferol (VITAMIN D3) 400 unit (10 mcg) TABS tablet     Sig: Take by mouth daily     ABO   Date Value Ref Range Status   09/10/2019 O  Final     RH(D)   Date Value Ref Range Status   09/10/2019 Pos  Final     Antibody Screen   Date Value Ref Range Status   09/10/2019 Neg  Final   Rhogam  was not given.    - Reviewed total weight gain, encouraged continued healthy diet and exercise. Reviewed importance of daily fetal kick count and why/how to contact provider.    - Reviewed why/how to contact provider if headache/visual changes/RUQ or epigastric pain, decreased fetal movement, vaginal bleeding, leakage of fluid or more than 4 contractions in an hour.     Answered pt's questions to her satisfaction.     Reviewed GBS screening at " 35-36 wks.      Return to clinic in 2 weeks and prn if questions or concerns.     Eden Llanes CNM      Answers for HPI/ROS submitted by the patient on 2/11/2020   BRADLEY 7 TOTAL SCORE: 3

## 2020-02-17 NOTE — PROGRESS NOTES
"Subjective:      34 year old  at 32w1d presentst for a routine prenatal appointment.    Denies vaginal bleeding or leakage of fluid.  Denies contractions. Endorses fetal movement.       No HA, visual changes, RUQ or epigastric pain.   Reports she is taking vitamin D supplementation 4IU daily.  Patient concerns: wondering about fetal movement. Wondering about lactation support, wondering about first 2 hours postpartum.    Feeling well overall.  Reviewed EOB labs with patient.  Reviewed TDAP Previously given     Objective:  Vitals:    20 1120   BP: 122/75   Pulse: 102   Weight: 78.1 kg (172 lb 1.6 oz)   Height: 1.702 m (5' 7\")   See ob flowsheet    Assessment/Plan     Encounter Diagnosis   Name Primary?     Encounter for supervision of normal first pregnancy in second trimester Yes     No orders of the defined types were placed in this encounter.    Orders Placed This Encounter   Medications     cholecalciferol (VITAMIN D3) 400 unit (10 mcg) TABS tablet     Sig: Take by mouth daily     ABO   Date Value Ref Range Status   09/10/2019 O  Final     RH(D)   Date Value Ref Range Status   09/10/2019 Pos  Final     Antibody Screen   Date Value Ref Range Status   09/10/2019 Neg  Final   Rhogam  was not given.    - Reviewed total weight gain, encouraged continued healthy diet and exercise. Reviewed importance of daily fetal kick count and why/how to contact provider.    - Reviewed why/how to contact provider if headache/visual changes/RUQ or epigastric pain, decreased fetal movement, vaginal bleeding, leakage of fluid or more than 4 contractions in an hour.     Answered pt's questions to her satisfaction.     Reviewed GBS screening at 35-36 wks.      Return to clinic in 2 weeks and prn if questions or concerns.     Eden Llanes CNM      Answers for HPI/ROS submitted by the patient on 2020   BRADLEY 7 TOTAL SCORE: 3    "

## 2020-02-26 ASSESSMENT — ANXIETY QUESTIONNAIRES
5. BEING SO RESTLESS THAT IT IS HARD TO SIT STILL: NOT AT ALL
4. TROUBLE RELAXING: NOT AT ALL
1. FEELING NERVOUS, ANXIOUS, OR ON EDGE: NOT AT ALL
7. FEELING AFRAID AS IF SOMETHING AWFUL MIGHT HAPPEN: NOT AT ALL
6. BECOMING EASILY ANNOYED OR IRRITABLE: SEVERAL DAYS
2. NOT BEING ABLE TO STOP OR CONTROL WORRYING: NOT AT ALL
GAD7 TOTAL SCORE: 2
GAD7 TOTAL SCORE: 2
3. WORRYING TOO MUCH ABOUT DIFFERENT THINGS: SEVERAL DAYS
7. FEELING AFRAID AS IF SOMETHING AWFUL MIGHT HAPPEN: NOT AT ALL

## 2020-02-27 ASSESSMENT — ANXIETY QUESTIONNAIRES: GAD7 TOTAL SCORE: 2

## 2020-03-04 ENCOUNTER — OFFICE VISIT (OUTPATIENT)
Dept: OBGYN | Facility: CLINIC | Age: 35
End: 2020-03-04
Attending: ADVANCED PRACTICE MIDWIFE
Payer: COMMERCIAL

## 2020-03-04 VITALS
BODY MASS INDEX: 27.26 KG/M2 | HEIGHT: 67 IN | SYSTOLIC BLOOD PRESSURE: 110 MMHG | DIASTOLIC BLOOD PRESSURE: 68 MMHG | HEART RATE: 121 BPM | WEIGHT: 173.7 LBS

## 2020-03-04 DIAGNOSIS — F41.9 ANXIETY: ICD-10-CM

## 2020-03-04 DIAGNOSIS — Z34.03 ENCOUNTER FOR SUPERVISION OF NORMAL FIRST PREGNANCY IN THIRD TRIMESTER: Primary | ICD-10-CM

## 2020-03-04 PROCEDURE — G0463 HOSPITAL OUTPT CLINIC VISIT: HCPCS | Mod: ZF

## 2020-03-04 RX ORDER — HYDROXYZINE PAMOATE 25 MG/1
25 CAPSULE ORAL 3 TIMES DAILY PRN
Qty: 30 CAPSULE | Refills: 0 | Status: ON HOLD | OUTPATIENT
Start: 2020-03-04 | End: 2020-04-04

## 2020-03-04 ASSESSMENT — MIFFLIN-ST. JEOR: SCORE: 1525.53

## 2020-03-04 ASSESSMENT — PAIN SCALES - GENERAL: PAINLEVEL: NO PAIN (0)

## 2020-03-04 NOTE — LETTER
"3/4/2020       RE: Deb Olivera  [address]  [facility] [address]     Dear Colleague,    Thank you for referring your patient, Deb Olivera, to the [facility] at [facility]. Please see a copy of my visit note below.    Subjective:      34 year old  at 34w3d presentst for a routine prenatal appointment.    Denies vaginal bleeding or leakage of fluid.  Denies contractions. Endorses fetal movement.       No HA, visual changes, RUQ or epigastric pain.   Patient concerns: Having a lot of anxiety. Had a panic attack on Friday that was very scary for her (needed to call her  to come home from work). She has seen a therapist ongoing weekly to manage her anxiety, it's been two weeks now due to scheduling issues. She hasn't taken medication for this in years but is open to discussion today.   Feeling well overall.  Reviewed EOB labs with patient.    Objective:  Vitals:    20 0904   BP: 110/68   Pulse: 121   Weight: 78.8 kg (173 lb 11.2 oz)   Height: 1.702 m (5' 7\")   See ob flowsheet    Assessment/Plan     Encounter Diagnosis   Name Primary?     Encounter for supervision of normal first pregnancy in third trimester Yes     ABO   Date Value Ref Range Status   09/10/2019 O  Final     RH(D)   Date Value Ref Range Status   09/10/2019 Pos  Final     Antibody Screen   Date Value Ref Range Status   09/10/2019 Neg  Final   Rhogam was not given.    - Reviewed total weight gain, encouraged continued healthy diet and exercise. Reviewed importance of daily fetal kick count and why/how to contact provider.    - Reviewed why/how to contact provider if headache/visual changes/RUQ or epigastric pain, decreased fetal movement, vaginal bleeding, leakage of fluid or more than 4 contractions in an hour.    - Discussed anxiety at length. Will consider increasing frequency of therapy. Reviewed MBSR, breathing exercises. Reviewed medications for anxiety in pregnancy, " she would like to try hydroxyzine. Applauded her great self awareness and self care. She will reach back out if she needs further assistance on this issue.     Reviewed GBS screening at 35-36 wks.    Return to clinic in 2 weeks and prn if questions or concerns.     ANANT Clarke CNM    Answers for HPI/ROS submitted by the patient on 2/26/2020   BRADLEY 7 TOTAL SCORE: 2      Again, thank you for allowing me to participate in the care of your patient.      Sincerely,    Eden Llanes CNM

## 2020-03-04 NOTE — PROGRESS NOTES
"Subjective:      34 year old  at 34w3d presentst for a routine prenatal appointment.    Denies vaginal bleeding or leakage of fluid.  Denies contractions. Endorses fetal movement.       No HA, visual changes, RUQ or epigastric pain.   Patient concerns: Having a lot of anxiety. Had a panic attack on Friday that was very scary for her (needed to call her  to come home from work). She has seen a therapist ongoing weekly to manage her anxiety, it's been two weeks now due to scheduling issues. She hasn't taken medication for this in years but is open to discussion today.   Feeling well overall.  Reviewed EOB labs with patient.    Objective:  Vitals:    20 0904   BP: 110/68   Pulse: 121   Weight: 78.8 kg (173 lb 11.2 oz)   Height: 1.702 m (5' 7\")   See ob flowsheet    Assessment/Plan     Encounter Diagnosis   Name Primary?     Encounter for supervision of normal first pregnancy in third trimester Yes     ABO   Date Value Ref Range Status   09/10/2019 O  Final     RH(D)   Date Value Ref Range Status   09/10/2019 Pos  Final     Antibody Screen   Date Value Ref Range Status   09/10/2019 Neg  Final   Rhogam was not given.    - Reviewed total weight gain, encouraged continued healthy diet and exercise. Reviewed importance of daily fetal kick count and why/how to contact provider.    - Reviewed why/how to contact provider if headache/visual changes/RUQ or epigastric pain, decreased fetal movement, vaginal bleeding, leakage of fluid or more than 4 contractions in an hour.    - Discussed anxiety at length. Will consider increasing frequency of therapy. Reviewed MBSR, breathing exercises. Reviewed medications for anxiety in pregnancy, she would like to try hydroxyzine. Applauded her great self awareness and self care. She will reach back out if she needs further assistance on this issue.     Reviewed GBS screening at 35-36 wks.    Return to clinic in 2 weeks and prn if questions or concerns.     Heidy Harmon " ANANT Llanes CNM    Answers for HPI/ROS submitted by the patient on 2/26/2020   BRADLEY 7 TOTAL SCORE: 2

## 2020-03-10 ASSESSMENT — ANXIETY QUESTIONNAIRES
7. FEELING AFRAID AS IF SOMETHING AWFUL MIGHT HAPPEN: NOT AT ALL
4. TROUBLE RELAXING: NOT AT ALL
2. NOT BEING ABLE TO STOP OR CONTROL WORRYING: NOT AT ALL
5. BEING SO RESTLESS THAT IT IS HARD TO SIT STILL: NOT AT ALL
1. FEELING NERVOUS, ANXIOUS, OR ON EDGE: NOT AT ALL
6. BECOMING EASILY ANNOYED OR IRRITABLE: SEVERAL DAYS
GAD7 TOTAL SCORE: 2
GAD7 TOTAL SCORE: 2
3. WORRYING TOO MUCH ABOUT DIFFERENT THINGS: SEVERAL DAYS
7. FEELING AFRAID AS IF SOMETHING AWFUL MIGHT HAPPEN: NOT AT ALL

## 2020-03-18 ENCOUNTER — OFFICE VISIT (OUTPATIENT)
Dept: OBGYN | Facility: CLINIC | Age: 35
End: 2020-03-18
Attending: ADVANCED PRACTICE MIDWIFE
Payer: COMMERCIAL

## 2020-03-18 VITALS
HEART RATE: 137 BPM | WEIGHT: 172.8 LBS | HEIGHT: 67 IN | DIASTOLIC BLOOD PRESSURE: 77 MMHG | BODY MASS INDEX: 27.12 KG/M2 | SYSTOLIC BLOOD PRESSURE: 120 MMHG

## 2020-03-18 DIAGNOSIS — Z34.03 ENCOUNTER FOR SUPERVISION OF NORMAL FIRST PREGNANCY IN THIRD TRIMESTER: Primary | ICD-10-CM

## 2020-03-18 DIAGNOSIS — F41.1 GENERALIZED ANXIETY DISORDER: ICD-10-CM

## 2020-03-18 PROCEDURE — 87653 STREP B DNA AMP PROBE: CPT | Performed by: ADVANCED PRACTICE MIDWIFE

## 2020-03-18 PROCEDURE — G0463 HOSPITAL OUTPT CLINIC VISIT: HCPCS | Mod: ZF

## 2020-03-18 ASSESSMENT — ANXIETY QUESTIONNAIRES
GAD7 TOTAL SCORE: 4
1. FEELING NERVOUS, ANXIOUS, OR ON EDGE: SEVERAL DAYS
3. WORRYING TOO MUCH ABOUT DIFFERENT THINGS: SEVERAL DAYS
7. FEELING AFRAID AS IF SOMETHING AWFUL MIGHT HAPPEN: NOT AT ALL
2. NOT BEING ABLE TO STOP OR CONTROL WORRYING: SEVERAL DAYS
6. BECOMING EASILY ANNOYED OR IRRITABLE: NOT AT ALL
5. BEING SO RESTLESS THAT IT IS HARD TO SIT STILL: NOT AT ALL

## 2020-03-18 ASSESSMENT — PATIENT HEALTH QUESTIONNAIRE - PHQ9: 5. POOR APPETITE OR OVEREATING: SEVERAL DAYS

## 2020-03-18 ASSESSMENT — MIFFLIN-ST. JEOR: SCORE: 1521.45

## 2020-03-18 NOTE — PROGRESS NOTES
"Subjective:      34 year old  at 36w3d presents for a routine prenatal appointment alone given Corona Virus visitor restrictions.        no vaginal bleeding,  leakage of fluid, or change in vaginal discharge.  no contractions.  + fetal movement.     No HA, visual changes, RUQ or epigastric pain.     Patient concerns: Feeling well overall.     - Has considered hydroxyzine for anxiety.  Tearful when discussing stress but able to compose herself and complete visit.  Hopeful that her  and her  can both be present.  Will start working on plan with  for virtual support, be more explicit with birth preferences.   - no longer planning water birth - upright/mobility. Hypnotherapy practice - will increase.   - Interested in AMSTL  - Desires delayed cord clamping but undecided how log  - Interested in home visits postpartum from [place]  -  Hep B discussion - undecided if she'll get first injection at hospital or pediatric offtice.   - Would like to limit Cervical exams. Reviewed how long they take and how they're done.   - out of breath at times. Sleeping \"ok\".  Mostly working remote.   - Cardiology update - tachycardia with occasional palpitations. Had normal echo 2020. See note from January that stated \"As a precaution, she could certainly consider having a heart monitor in place during delivery, but otherwise no additional medical therapy or intervention would be currently indicated.\"         Objective:  Vitals:    20 0949   BP: 120/77   BP Location: Left arm   Patient Position: Chair   Pulse: 137   Weight: 78.4 kg (172 lb 12.8 oz)   Height: 1.702 m (5' 7\")    See OB flowsheet    Assessment/Plan     Encounter Diagnoses   Name Primary?     Encounter for supervision of normal first pregnancy in third trimester - [facility] CNM Yes     Generalized anxiety disorder      Orders Placed This Encounter   Procedures     CBC with Platelets         PHQ-9 SCORE 9/10/2019 2020   PHQ-9 Total Score 3 0 "     GBS screening: Obtained - pt self swabbed after directions.   Birth preferences reviewed: Upright/mobility   Labor signs discussed. Reinforced daily fetal movement counts.  Reviewed why/how to contact provider if headache/visual changes/RUQ or epigastric pain, decreased fetal movement, vaginal bleeding, leakage of fluid.  - Reinforced COVID 19 recommendations - social distancing, hand hygiene, avoid touching face, report any known or suspected exposure or s/s promptly.   Reviewed CNM Cohort scheduling - may have change to provider as scheduled.   Reinforced at present Hospital restriction on visitors to 1 - no switch out. At present doulas are NOT excluded from this restriction.  Reinforced at present office restriction on visitors - patient only.  - Reinforced normal assessment of maternal cardiac function in labor - plan EKG and anesthesia consult prn if s/s.  No continuous maternal cardiac monitoring and no concern with labor or vaginal delivery.    Return to clinic in 1 week and prn if questions or concerns.     Patria Purvis, ANANT CNM  Answers for HPI/ROS submitted by the patient on 3/10/2020   BRADLEY 7 TOTAL SCORE: 2

## 2020-03-18 NOTE — LETTER
"3/18/2020       RE: Deb Olivera  [address]  [place] [address]     Dear Colleague,    Thank you for referring your patient, Deb Olivera, to the [facility] at [facility]. Please see a copy of my visit note below.    Subjective:      34 year old  at 36w3d presents for a routine prenatal appointment alone given Corona Virus visitor restrictions.        no vaginal bleeding,  leakage of fluid, or change in vaginal discharge.  no contractions.  + fetal movement.     No HA, visual changes, RUQ or epigastric pain.     Patient concerns: Feeling well overall.     - Has considered hydroxyzine for anxiety.  Tearful when discussing stress but able to compose herself and complete visit.  Hopeful that her  and her  can both be present.  Will start working on plan with  for virtual support, be more explicit with birth preferences.   - no longer planning water birth - upright/mobility. Hypnotherapy practice - will increase.   - Interested in AMSTL  - Desires delayed cord clamping but undecided how log  - Interested in home visits postpartum from [place]  -  Hep B discussion - undecided if she'll get first injection at hospital or pediatric offtice.   - Would like to limit Cervical exams. Reviewed how long they take and how they're done.   - out of breath at times. Sleeping \"ok\".  Mostly working remote.   - Cardiology update - tachycardia with occasional palpitations. Had normal echo 2020. See note from January that stated \"As a precaution, she could certainly consider having a heart monitor in place during delivery, but otherwise no additional medical therapy or intervention would be currently indicated.\"         Objective:  Vitals:    20 0949   BP: 120/77   BP Location: Left arm   Patient Position: Chair   Pulse: 137   Weight: 78.4 kg (172 lb 12.8 oz)   Height: 1.702 m (5' 7\")    See OB flowsheet    Assessment/Plan   "   Encounter Diagnoses   Name Primary?     Encounter for supervision of normal first pregnancy in third trimester - [facility] CNM Yes     Generalized anxiety disorder      Orders Placed This Encounter   Procedures     CBC with Platelets         PHQ-9 SCORE 9/10/2019 1/20/2020   PHQ-9 Total Score 3 0     GBS screening: Obtained - pt self swabbed after directions.   Birth preferences reviewed: Upright/mobility   Labor signs discussed. Reinforced daily fetal movement counts.  Reviewed why/how to contact provider if headache/visual changes/RUQ or epigastric pain, decreased fetal movement, vaginal bleeding, leakage of fluid.  - Reinforced COVID 19 recommendations - social distancing, hand hygiene, avoid touching face, report any known or suspected exposure or s/s promptly.   Reviewed CNM Cohort scheduling - may have change to provider as scheduled.   Reinforced at present Hospital restriction on visitors to 1 - no switch out. At present doulas are NOT excluded from this restriction.  Reinforced at present office restriction on visitors - patient only.  - Reinforced normal assessment of maternal cardiac function in labor - plan EKG and anesthesia consult prn if s/s.  No continuous maternal cardiac monitoring and no concern with labor or vaginal delivery.    Return to clinic in 1 week and prn if questions or concerns.     ANANT Streeter CNM  Answers for HPI/ROS submitted by the patient on 3/10/2020   BRADLEY 7 TOTAL SCORE: 2      Again, thank you for allowing me to participate in the care of your patient.      Sincerely,    ANANT Streeter CNM

## 2020-03-19 LAB
GP B STREP DNA SPEC QL NAA+PROBE: NEGATIVE
SPECIMEN SOURCE: NORMAL

## 2020-03-20 ASSESSMENT — ANXIETY QUESTIONNAIRES
6. BECOMING EASILY ANNOYED OR IRRITABLE: SEVERAL DAYS
2. NOT BEING ABLE TO STOP OR CONTROL WORRYING: NOT AT ALL
1. FEELING NERVOUS, ANXIOUS, OR ON EDGE: NOT AT ALL
4. TROUBLE RELAXING: NOT AT ALL
3. WORRYING TOO MUCH ABOUT DIFFERENT THINGS: SEVERAL DAYS
GAD7 TOTAL SCORE: 2
7. FEELING AFRAID AS IF SOMETHING AWFUL MIGHT HAPPEN: NOT AT ALL
5. BEING SO RESTLESS THAT IT IS HARD TO SIT STILL: NOT AT ALL

## 2020-03-23 ASSESSMENT — ANXIETY QUESTIONNAIRES
7. FEELING AFRAID AS IF SOMETHING AWFUL MIGHT HAPPEN: NOT AT ALL
GAD7 TOTAL SCORE: 2
GAD7 TOTAL SCORE: 2

## 2020-03-24 ASSESSMENT — ANXIETY QUESTIONNAIRES: GAD7 TOTAL SCORE: 2

## 2020-03-26 ENCOUNTER — OFFICE VISIT (OUTPATIENT)
Dept: OBGYN | Facility: CLINIC | Age: 35
End: 2020-03-26
Attending: ADVANCED PRACTICE MIDWIFE
Payer: COMMERCIAL

## 2020-03-26 VITALS
SYSTOLIC BLOOD PRESSURE: 130 MMHG | HEART RATE: 112 BPM | HEIGHT: 67 IN | DIASTOLIC BLOOD PRESSURE: 82 MMHG | WEIGHT: 172.6 LBS | BODY MASS INDEX: 27.09 KG/M2

## 2020-03-26 DIAGNOSIS — Z34.03 ENCOUNTER FOR SUPERVISION OF NORMAL FIRST PREGNANCY IN THIRD TRIMESTER: ICD-10-CM

## 2020-03-26 DIAGNOSIS — Z34.03 ENCOUNTER FOR SUPERVISION OF NORMAL FIRST PREGNANCY IN THIRD TRIMESTER: Primary | ICD-10-CM

## 2020-03-26 DIAGNOSIS — O26.899 CARPAL TUNNEL SYNDROME DURING PREGNANCY: ICD-10-CM

## 2020-03-26 DIAGNOSIS — F41.1 GENERALIZED ANXIETY DISORDER: ICD-10-CM

## 2020-03-26 DIAGNOSIS — G56.00 CARPAL TUNNEL SYNDROME DURING PREGNANCY: ICD-10-CM

## 2020-03-26 LAB
ERYTHROCYTE [DISTWIDTH] IN BLOOD BY AUTOMATED COUNT: 12.6 % (ref 10–15)
HCT VFR BLD AUTO: 35.3 % (ref 35–47)
HGB BLD-MCNC: 11.7 G/DL (ref 11.7–15.7)
MCH RBC QN AUTO: 31 PG (ref 26.5–33)
MCHC RBC AUTO-ENTMCNC: 33.1 G/DL (ref 31.5–36.5)
MCV RBC AUTO: 94 FL (ref 78–100)
PLATELET # BLD AUTO: 316 10E9/L (ref 150–450)
RBC # BLD AUTO: 3.77 10E12/L (ref 3.8–5.2)
WBC # BLD AUTO: 13 10E9/L (ref 4–11)

## 2020-03-26 PROCEDURE — 85027 COMPLETE CBC AUTOMATED: CPT | Performed by: ADVANCED PRACTICE MIDWIFE

## 2020-03-26 PROCEDURE — 36415 COLL VENOUS BLD VENIPUNCTURE: CPT | Performed by: ADVANCED PRACTICE MIDWIFE

## 2020-03-26 PROCEDURE — G0463 HOSPITAL OUTPT CLINIC VISIT: HCPCS | Mod: ZF

## 2020-03-26 ASSESSMENT — MIFFLIN-ST. JEOR: SCORE: 1520.54

## 2020-03-26 NOTE — LETTER
"3/26/2020       RE: Deb Olivera  [address]  [place] [address]     Dear Colleague,    Thank you for referring your patient, Deb Olivera, to the [facility] at [facility]. Please see a copy of my visit note below.    .  Subjective:      34 year old  at 37w4d presents for a routine prenatal appointment.  no vaginal bleeding,  leakage of fluid, or change in vaginal discharge.  no contractions.  + fetal movement.     No HA, visual changes, RUQ or epigastric pain.   Patient concerns:   Feeling well overall.     - Her  wants to go golfing - do NOT recommend.  Encouraged pt to alert CNM if us calling him would help me understand the risk. Both of them should be quarantining at home.   - baby has hiccups at times but still normal movement.   - Reviewed recommended breastmilk if exposed to COVID19- can pump and non sick individual can feed  - [facility]   Working on phone visit before delivery.  Plans Hep B at peds clinic  - Having some carpal tunnel at night, has started wearing braces to sleep which help.  Encouraged to wear all day if needed and to plan while breastfeeding.     Pt tearful but trying her hardest to stay positive and balanced.  Plan  in labor, aware of visitors restrictions. Has  she's still working with for supports.  Reinforced NO water birth but pt was planning now hydrotherapy.  NO nitrous oxide.     Reviewed GBS negative.   Pt tried to get her CBC done last week but it was raining and she got lost trying to get to the lab and then there were just too many people waiting.  Will try to get done today. Is agreeable to daily iron.         Objective:  Vitals:    20 0957   BP: 130/82   BP Location: Left arm   Patient Position: Chair   Pulse: 112   Weight: 78.3 kg (172 lb 9.6 oz)   Height: 1.702 m (5' 7\")    See OB flowsheet    Assessment/Plan     Encounter Diagnoses   Name " Primary?     Encounter for supervision of normal first pregnancy in third trimester - [facility] CNM Yes     Generalized anxiety disorder      Carpal tunnel syndrome during pregnancy          PHQ-9 SCORE 9/10/2019 1/20/2020   PHQ-9 Total Score 3 0       GBS screening: Negative  Birth preferences reviewed: Un-Medicated  Labor signs discussed. Reinforced daily fetal movement counts.  Reviewed why/how to contact provider if headache/visual changes/RUQ or epigastric pain, decreased fetal movement, vaginal bleeding, leakage of fluid.  - Reinforced COVID 19 recommendations - social distancing, hand hygiene, avoid touching face, report any known or suspected exposure or s/s promptly.   Reviewed CNM Cohort scheduling - may have change to provider as scheduled.   Reinforced at present Hospital restriction on visitors to 1 - no switch out. At present doulas are NOT excluded from this restriction.  Reinforced at present office restriction on visitors - patient only.     Return to clinic in 1 week and prn if questions or concerns.     ANANT Streeter CNM  Answers for HPI/ROS submitted by the patient on 3/23/2020   BRADLEY 7 TOTAL SCORE: 2      Again, thank you for allowing me to participate in the care of your patient.      Sincerely,    ANANT Streeter CNM

## 2020-03-26 NOTE — PROGRESS NOTES
".  Subjective:      34 year old  at 37w4d presents for a routine prenatal appointment.  no vaginal bleeding,  leakage of fluid, or change in vaginal discharge.  no contractions.  + fetal movement.     No HA, visual changes, RUQ or epigastric pain.   Patient concerns:   Feeling well overall.     - Her  wants to go golfing - do NOT recommend.  Encouraged pt to alert CNM if us calling him would help me understand the risk. Both of them should be quarantining at home.   - baby has hiccups at times but still normal movement.   - Reviewed recommended breastmilk if exposed to COVID19- can pump and non sick individual can feed  - [facility]   Working on phone visit before delivery.  Plans Hep B at peds clinic  - Having some carpal tunnel at night, has started wearing braces to sleep which help.  Encouraged to wear all day if needed and to plan while breastfeeding.     Pt tearful but trying her hardest to stay positive and balanced.  Plan  in labor, aware of visitors restrictions. Has  she's still working with for supports.  Reinforced NO water birth but pt was planning now hydrotherapy.  NO nitrous oxide.     Reviewed GBS negative.   Pt tried to get her CBC done last week but it was raining and she got lost trying to get to the lab and then there were just too many people waiting.  Will try to get done today. Is agreeable to daily iron.         Objective:  Vitals:    20 0957   BP: 130/82   BP Location: Left arm   Patient Position: Chair   Pulse: 112   Weight: 78.3 kg (172 lb 9.6 oz)   Height: 1.702 m (5' 7\")    See OB flowsheet    Assessment/Plan     Encounter Diagnoses   Name Primary?     Encounter for supervision of normal first pregnancy in third trimester - [facility] CNM Yes     Generalized anxiety disorder      Carpal tunnel syndrome during pregnancy          PHQ-9 SCORE 9/10/2019 2020   PHQ-9 Total Score 3 0       GBS screening: Negative  Birth preferences " reviewed: Un-Medicated  Labor signs discussed. Reinforced daily fetal movement counts.  Reviewed why/how to contact provider if headache/visual changes/RUQ or epigastric pain, decreased fetal movement, vaginal bleeding, leakage of fluid.  - Reinforced COVID 19 recommendations - social distancing, hand hygiene, avoid touching face, report any known or suspected exposure or s/s promptly.   Reviewed CNM Cohort scheduling - may have change to provider as scheduled.   Reinforced at present Hospital restriction on visitors to 1 - no switch out. At present doulas are NOT excluded from this restriction.  Reinforced at present office restriction on visitors - patient only.     Return to clinic in 1 week and prn if questions or concerns.     Patria Purvis, ANANT CNM  Answers for HPI/ROS submitted by the patient on 3/23/2020   BRADLEY 7 TOTAL SCORE: 2

## 2020-04-01 NOTE — PROGRESS NOTES
Clinic visit not complete    Subjective:     34 year old  at 38w3d presents for routine prenatal visit.         Patient concerns:   - Pt reported to LPN while being roomed for clinic visit that she has been experiencing leaking of fluid throughout the morning. No large gushes, but consistent LOF.     Objective:  Vitals:    20 1022   BP: 102/71   Pulse: 104   Weight: 78.2 kg (172 lb 6.4 oz)    See OB flowsheet  Assessment/Plan    - Pt notified by LPN that she should instead [facility] for [facility]. Clinic visit will be canceled and plan will be made by [facility] provider for follow-up.   - [facility] midwife, Taran Greene, and L&D charge nurse were updated regarding patient's status and are expecting her.     ANANT Palomino CNM    Answers for HPI/ROS submitted by the patient on 3/23/2020   BRADLEY 7 TOTAL SCORE: 2

## 2020-04-02 ENCOUNTER — HOSPITAL ENCOUNTER (INPATIENT)
Facility: CLINIC | Age: 35
LOS: 2 days | Discharge: HOME HEALTH | End: 2020-04-04
Attending: OBSTETRICS & GYNECOLOGY | Admitting: ADVANCED PRACTICE MIDWIFE
Payer: COMMERCIAL

## 2020-04-02 ENCOUNTER — OFFICE VISIT (OUTPATIENT)
Dept: OBGYN | Facility: CLINIC | Age: 35
End: 2020-04-02
Attending: ADVANCED PRACTICE MIDWIFE
Payer: COMMERCIAL

## 2020-04-02 VITALS
DIASTOLIC BLOOD PRESSURE: 71 MMHG | BODY MASS INDEX: 27 KG/M2 | HEART RATE: 104 BPM | SYSTOLIC BLOOD PRESSURE: 102 MMHG | WEIGHT: 172.4 LBS

## 2020-04-02 DIAGNOSIS — G43.109 MIGRAINE AURA WITHOUT HEADACHE: ICD-10-CM

## 2020-04-02 DIAGNOSIS — F41.1 GENERALIZED ANXIETY DISORDER: ICD-10-CM

## 2020-04-02 DIAGNOSIS — Z34.03 ENCOUNTER FOR SUPERVISION OF NORMAL FIRST PREGNANCY IN THIRD TRIMESTER: Primary | ICD-10-CM

## 2020-04-02 LAB
ABO + RH BLD: NORMAL
ABO + RH BLD: NORMAL
BASOPHILS # BLD AUTO: 0 10E9/L (ref 0–0.2)
BASOPHILS NFR BLD AUTO: 0.2 %
BLD GP AB SCN SERPL QL: NORMAL
BLOOD BANK CMNT PATIENT-IMP: NORMAL
DIFFERENTIAL METHOD BLD: ABNORMAL
EOSINOPHIL # BLD AUTO: 0.2 10E9/L (ref 0–0.7)
EOSINOPHIL NFR BLD AUTO: 1.2 %
ERYTHROCYTE [DISTWIDTH] IN BLOOD BY AUTOMATED COUNT: 12.8 % (ref 10–15)
HCT VFR BLD AUTO: 36.6 % (ref 35–47)
HGB BLD-MCNC: 12 G/DL (ref 11.7–15.7)
IMM GRANULOCYTES # BLD: 0.1 10E9/L (ref 0–0.4)
IMM GRANULOCYTES NFR BLD: 0.6 %
LYMPHOCYTES # BLD AUTO: 2.7 10E9/L (ref 0.8–5.3)
LYMPHOCYTES NFR BLD AUTO: 21.3 %
MCH RBC QN AUTO: 30.8 PG (ref 26.5–33)
MCHC RBC AUTO-ENTMCNC: 32.8 G/DL (ref 31.5–36.5)
MCV RBC AUTO: 94 FL (ref 78–100)
MONOCYTES # BLD AUTO: 0.8 10E9/L (ref 0–1.3)
MONOCYTES NFR BLD AUTO: 6.6 %
NEUTROPHILS # BLD AUTO: 8.8 10E9/L (ref 1.6–8.3)
NEUTROPHILS NFR BLD AUTO: 70.1 %
NRBC # BLD AUTO: 0 10*3/UL
NRBC BLD AUTO-RTO: 0 /100
PLATELET # BLD AUTO: 329 10E9/L (ref 150–450)
RBC # BLD AUTO: 3.89 10E12/L (ref 3.8–5.2)
RUPTURE OF FETAL MEMBRANES BY ROM PLUS: POSITIVE
SPECIMEN EXP DATE BLD: NORMAL
WBC # BLD AUTO: 12.6 10E9/L (ref 4–11)

## 2020-04-02 PROCEDURE — 12000001 ZZH R&B MED SURG/OB UMMC

## 2020-04-02 PROCEDURE — 25000132 ZZH RX MED GY IP 250 OP 250 PS 637: Performed by: MIDWIFE

## 2020-04-02 PROCEDURE — 86900 BLOOD TYPING SEROLOGIC ABO: CPT | Performed by: ADVANCED PRACTICE MIDWIFE

## 2020-04-02 PROCEDURE — 85025 COMPLETE CBC W/AUTO DIFF WBC: CPT | Performed by: ADVANCED PRACTICE MIDWIFE

## 2020-04-02 PROCEDURE — 86780 TREPONEMA PALLIDUM: CPT | Performed by: ADVANCED PRACTICE MIDWIFE

## 2020-04-02 PROCEDURE — 86901 BLOOD TYPING SEROLOGIC RH(D): CPT | Performed by: ADVANCED PRACTICE MIDWIFE

## 2020-04-02 PROCEDURE — G0463 HOSPITAL OUTPT CLINIC VISIT: HCPCS

## 2020-04-02 PROCEDURE — 86850 RBC ANTIBODY SCREEN: CPT | Performed by: ADVANCED PRACTICE MIDWIFE

## 2020-04-02 PROCEDURE — 84112 EVAL AMNIOTIC FLUID PROTEIN: CPT | Performed by: ADVANCED PRACTICE MIDWIFE

## 2020-04-02 RX ORDER — CARBOPROST TROMETHAMINE 250 UG/ML
250 INJECTION, SOLUTION INTRAMUSCULAR
Status: DISCONTINUED | OUTPATIENT
Start: 2020-04-02 | End: 2020-04-04 | Stop reason: HOSPADM

## 2020-04-02 RX ORDER — NALOXONE HYDROCHLORIDE 0.4 MG/ML
.1-.4 INJECTION, SOLUTION INTRAMUSCULAR; INTRAVENOUS; SUBCUTANEOUS
Status: DISCONTINUED | OUTPATIENT
Start: 2020-04-02 | End: 2020-04-04 | Stop reason: HOSPADM

## 2020-04-02 RX ORDER — OXYCODONE AND ACETAMINOPHEN 5; 325 MG/1; MG/1
1 TABLET ORAL
Status: DISCONTINUED | OUTPATIENT
Start: 2020-04-02 | End: 2020-04-03

## 2020-04-02 RX ORDER — ACETAMINOPHEN 325 MG/1
650 TABLET ORAL EVERY 4 HOURS PRN
Status: DISCONTINUED | OUTPATIENT
Start: 2020-04-02 | End: 2020-04-03

## 2020-04-02 RX ORDER — OXYTOCIN 10 [USP'U]/ML
10 INJECTION, SOLUTION INTRAMUSCULAR; INTRAVENOUS
Status: DISCONTINUED | OUTPATIENT
Start: 2020-04-02 | End: 2020-04-04 | Stop reason: HOSPADM

## 2020-04-02 RX ORDER — HYDROXYZINE HYDROCHLORIDE 50 MG/1
50-100 TABLET, FILM COATED ORAL
Status: COMPLETED | OUTPATIENT
Start: 2020-04-02 | End: 2020-04-02

## 2020-04-02 RX ORDER — IBUPROFEN 800 MG/1
800 TABLET, FILM COATED ORAL
Status: COMPLETED | OUTPATIENT
Start: 2020-04-02 | End: 2020-04-03

## 2020-04-02 RX ORDER — OXYTOCIN/0.9 % SODIUM CHLORIDE 30/500 ML
100-340 PLASTIC BAG, INJECTION (ML) INTRAVENOUS CONTINUOUS PRN
Status: COMPLETED | OUTPATIENT
Start: 2020-04-02 | End: 2020-04-03

## 2020-04-02 RX ORDER — FENTANYL CITRATE 50 UG/ML
50-100 INJECTION, SOLUTION INTRAMUSCULAR; INTRAVENOUS
Status: DISCONTINUED | OUTPATIENT
Start: 2020-04-02 | End: 2020-04-04 | Stop reason: HOSPADM

## 2020-04-02 RX ORDER — METHYLERGONOVINE MALEATE 0.2 MG/ML
200 INJECTION INTRAVENOUS
Status: DISCONTINUED | OUTPATIENT
Start: 2020-04-02 | End: 2020-04-03

## 2020-04-02 RX ORDER — ONDANSETRON 2 MG/ML
4 INJECTION INTRAMUSCULAR; INTRAVENOUS EVERY 6 HOURS PRN
Status: DISCONTINUED | OUTPATIENT
Start: 2020-04-02 | End: 2020-04-04 | Stop reason: HOSPADM

## 2020-04-02 RX ORDER — MISOPROSTOL 100 UG/1
25 TABLET ORAL
Status: DISCONTINUED | OUTPATIENT
Start: 2020-04-02 | End: 2020-04-03

## 2020-04-02 RX ORDER — SODIUM CHLORIDE, SODIUM LACTATE, POTASSIUM CHLORIDE, CALCIUM CHLORIDE 600; 310; 30; 20 MG/100ML; MG/100ML; MG/100ML; MG/100ML
INJECTION, SOLUTION INTRAVENOUS CONTINUOUS
Status: DISCONTINUED | OUTPATIENT
Start: 2020-04-02 | End: 2020-04-04 | Stop reason: HOSPADM

## 2020-04-02 RX ADMIN — Medication 25 MCG: at 23:18

## 2020-04-02 RX ADMIN — Medication 25 MCG: at 20:57

## 2020-04-02 RX ADMIN — Medication 25 MCG: at 19:04

## 2020-04-02 RX ADMIN — HYDROXYZINE HYDROCHLORIDE 100 MG: 50 TABLET, FILM COATED ORAL at 23:19

## 2020-04-02 ASSESSMENT — PAIN SCALES - GENERAL: PAINLEVEL: NO PAIN (0)

## 2020-04-02 ASSESSMENT — MIFFLIN-ST. JEOR: SCORE: 1522.35

## 2020-04-02 NOTE — H&P
"ADMIT NOTE  =================  38w4d    Deb Olivera is a 34 year old female with an Patient's last menstrual period was 2019 (approximate). and Estimated Date of Delivery: 2020 [facility] 2020 at 11:45 AM with SROM without labor.     HPI  ================  Pt sent from clinic for r/o ROM.     Pt reports using restroom this AM, then laid down for ~1 hour. Upon rising, noticed general dampness around 0900. Took at home \"amni-check\" test, which was negative.  Laid down again for 10 minutes, after standing did not notice gush or leaking sensation. Denies continuous leaking sensation.  Reports Adams Cunningham contractions. Denies bleeding. + fetal movement.    Contractions- mild  Fetal movement- active  ROM- pending romplus  Vaginal bleeding- none  GBS- negative  FOB- is involved, Elan.  Other labor support- none    Weight gain- 172 - 155 lbs, Total weight gain- 18 lbs  Height- 67 in  BMI- 24  First prenatal visit at 9 weeks, Total visits- 11    PROBLEM LIST  =================  Patient Active Problem List    Diagnosis Date Noted     Generalized anxiety disorder 2011     Priority: High     Labor and delivery, indication for care 2020     Priority: Medium     Carpal tunnel syndrome during pregnancy 2020     Priority: Medium     Encounter for supervision of normal first pregnancy in third trimester - [facility] 09/10/2019     Priority: Medium     2019: level 2 ultrasound- placenta posterior.       Hx of abnormal cervical Pap smear 09/10/2019     Priority: Medium     14: LSIL, colpo biopsy CIN1  4/23/15: NIL, HPV neg  4/10/18: NIL, HPV neg - routine screening per [facility]          Migraine aura without headache 2014     Priority: Medium     HISTORIES  ============  No Known Allergies  Past Medical History:   Diagnosis Date     Hx of abnormal pap 2014    LSIL, colpo CIN1     Past Surgical History:   Procedure Laterality Date     ESOPHAGOSCOPY, " "GASTROSCOPY, DUODENOSCOPY (EGD), COMBINED N/A 2019    Procedure: COMBINED ESOPHAGOSCOPY, GASTROSCOPY, DUODENOSCOPY (EGD);  Surgeon: Vinay Childers MD;  Location: [facility]   .  Family History   Problem Relation Age of Onset     Diabetes Paternal Grandfather      Other Cancer Paternal Grandfather      Lipids Father      Other Cancer Father         testicular     Hypertension Father      Other Cancer Maternal Grandmother         brain cancer     Lung Cancer Paternal Grandmother      Social History     Tobacco Use     Smoking status: Former Smoker     Packs/day: 0.00     Years: 2.00     Pack years: 0.00     Types: Cigarettes     Smokeless tobacco: Never Used     Tobacco comment: when 19   Substance Use Topics     Alcohol use: Not Currently     Comment: occ     OB History    Para Term  AB Living   1 0 0 0 0 0   SAB TAB Ectopic Multiple Live Births   0 0 0 0 0      # Outcome Date GA Lbr Jake/2nd Weight Sex Delivery Anes PTL Lv   1 Current               LABS:   ===========  Prenatal Labs:  Rhogam not indicated   Lab Results   Component Value Date    ABO O 09/10/2019    RH Pos 09/10/2019    AS Neg 09/10/2019    HEPBANG Nonreactive 09/10/2019    HGB 11.7 2020     Rubella immune  Lab Results   Component Value Date    GBS Negative 2020     Other labs:  Results for orders placed or performed during the hospital encounter of 20 (from the past 24 hour(s))   Rupture of Fetal Membranes by ROM Plus   Result Value Ref Range    Rupture of Fetal Membranes by ROM Plus Positive (A) NEG^Negative     ROS  =========  Pt denies significant respiratory, cardiovacular, GI, or muscular/skeletalcomplaints.    See RN data base ROS.     PHYSICAL EXAM:  ===============  Temp 98.4  F (36.9  C) (Oral)   Resp 16   Ht 1.702 m (5' 7\")   Wt 78.5 kg (173 lb)   LMP 2019 (Approximate)   Breastfeeding No   BMI 27.10 kg/m    General appearance: comfortable  GENERAL APPEARANCE: healthy, alert and no " distress  RESP: normal respiratory effort  CV: regular rates and rhythm, normal S1 S2, no S3 or S4 and no murmur,and no varicosities  ABDOMEN:  soft, nontender, no epigastric pain  SKIN: no suspicious lesions or rashes  NEURO: Denies headache, blurred vision, other vision changes  PSYCH: mentation appears normal. and affect normal/bright  Legs: Reflexes normal bilaterally     Abdomen: gravid, vertex fetus per Leopold's, non-tender between contractions.   Cephalic presentation confirmed by BSUS  EFW-  7.5 lbs.   CONTACTIONS: irreg and mild  FETAL HEART TONES: continuous EFM- baseline 140 with moderate variability and positive accelerations. No decelerations.  PELVIC EXAM: closed/ 50%/ Anterior/ average/ -3   DOSHI SCORE: 4  BLOODY SHOW: no   ROM: yes  FLUID: clear  AMNISURE: positive    ASSESSMENT:  ==============  IUP @ 38w4d admitted with SROM without labor   NST REACTIVE  Fetal Heart Rate - category one  GBS- negative    Patient Active Problem List   Diagnosis     Generalized anxiety disorder     Migraine aura without headache     Encounter for supervision of normal first pregnancy in third trimester - [facility] CNM     Hx of abnormal cervical Pap smear     Carpal tunnel syndrome during pregnancy     Labor and delivery, indication for care     PLAN:  ===========  Admit - see IP orders  Pain medication options reviewed. Pt is interested in non-pharmacologic options.  Cervical ripening with misoprostol oral vs. expectant management reviewed with patient and partner. Pt desires expectant management for 3-4 hours, may consider cervical ripening after.  Answered all patient and partner's questions.  Ambulation, hydration, position changes, birthing ball and tub options to facilitate labor reviewed with pt .  ANANT Dinh CN

## 2020-04-02 NOTE — LETTER
2020       RE: Deb Olivera  [address]  [place] [address]     Dear Colleague,    Thank you for referring your patient, Deb Olivera, to the [facility] at [facility]. Please see a copy of my visit note below.    Clinic visit not complete    Subjective:     34 year old  at 38w3d presents for routine prenatal visit.         Patient concerns:   - Pt reported to LPN while being roomed for clinic visit that she has been experiencing leaking of fluid throughout the morning. No large gushes, but consistent LOF.     Objective:  Vitals:    20 1022   BP: 102/71   Pulse: 104   Weight: 78.2 kg (172 lb 6.4 oz)    See OB flowsheet  Assessment/Plan    - Pt notified by LPN that she should instead [facility] for [facility]. Clinic visit will be canceled and plan will be made by [facility] provider for follow-up.   - [facility] midwife, Taran Greene, and L&D charge nurse were updated regarding patient's status and are expecting her.     ANANT Palomino CNM    Answers for HPI/ROS submitted by the patient on 3/23/2020   BRADLEY 7 TOTAL SCORE: 2      Again, thank you for allowing me to participate in the care of your patient.      Sincerely,    ANANT Palomino CNM

## 2020-04-02 NOTE — PLAN OF CARE
Data: Patient admitted to room 473 at 1200. Patient is a . Prenatal record reviewed.   OB History    Para Term  AB Living   1 0 0 0 0 0   SAB TAB Ectopic Multiple Live Births   0 0 0 0 0      # Outcome Date GA Lbr Jake/2nd Weight Sex Delivery Anes PTL Lv   1 Current            .  Medical History:   Past Medical History:   Diagnosis Date    Hx of abnormal pap 2014    LSIL, colpo CIN1   .  Gestational age 38w4d. Vital signs per doc flowsheet. Fetal movement present. Patient reports Fluid Leak   as reason for admission. Support persons on his way not present at this moment.  Action:  Care of patient assumed at 1045. Verbal consent for EFM, external fetal monitors applied. Admission assessment completed. Patient and support persons educated on labor process. Patient instructed to report change in fetal movement, contractions, vaginal leaking of fluid or bleeding, abdominal pain, or any concerns related to the pregnancy to her nurse/physician. Patient oriented to room, call light in reach.   Response: Taran Greene CNM  informed of patient arrival Plan per provider is as ordered. Patient verbalized understanding of education and verbalized agreement with plan. Patient coping with plan of care,

## 2020-04-02 NOTE — PROVIDER NOTIFICATION
04/02/20 1830   Provider Notification   Provider Name/Title JIMI Mcclellan CNM   Method of Notification At Bedside   Notification Reason Status Update     JIMI Mcclellan CNM, at bedside to greet patient and to speak about treatment plan. Pt and  discussed IOL options and asked questions. Plan is to begin PO miso. Will continue to closely monitor.

## 2020-04-02 NOTE — PROGRESS NOTES
Received order for SW to see regarding transportation needs.      Phone call to [place].  The RN spoke with patient and she denies transportation concerns.  The RN believes the order was entered in error.     Order closed.      Please refer again if social work needs/concerns are identified.

## 2020-04-02 NOTE — PROGRESS NOTES
"Blood pressure 115/65, temperature 98.1  F (36.7  C), temperature source Oral, resp. rate 16, height 1.702 m (5' 7\"), weight 78.5 kg (173 lb), last menstrual period 06/22/2019, not currently breastfeeding.  Patient Vitals for the past 24 hrs:   BP Temp Temp src Resp Height Weight   04/02/20 1830 -- 98.1  F (36.7  C) Oral -- -- --   04/02/20 1730 -- 97.4  F (36.3  C) Oral -- -- --   04/02/20 1630 -- 97.9  F (36.6  C) Oral -- -- --   04/02/20 1530 115/65 97.9  F (36.6  C) Oral 16 -- --   04/02/20 1102 -- 98.4  F (36.9  C) Oral 16 1.702 m (5' 7\") 78.5 kg (173 lb)     General appearance: comfortable. Not feeling any more contractions or cramping than earlier. Still describes as \"[place].\" Would like to discuss options to start IOL. Partner at bedside and supportive.     CONTRACTIONS: every 3-5 minutes, mild per patient   FETAL HEART TONES: intermittent ausculation  145, regular. No audible decelerations.  ROM: PRM at 0900, fluid remains clear, afebrile, no odor    PELVIC EXAM:deferred    # Pain Assessment:  Current Pain Score 4/2/2020   Patient currently in pain? denies   Deb s pain level was assessed and she currently denies pain.      ASSESSMENT:  ==============  IUP @ 38w4d with SROM without labor   Fetal Heart Rate Tracing category one  GBS- negative  Patient Active Problem List   Diagnosis     Generalized anxiety disorder     Migraine aura without headache     Encounter for supervision of normal first pregnancy in third trimester - [facility] CNM     Hx of abnormal cervical Pap smear     Carpal tunnel syndrome during pregnancy     Labor and delivery, indication for care     PLAN:  ===========  Reviewed r/b of starting cervical ripening now vs r/b of continuing expectant management. Pt feels like she would like to get things started. Discussed cervical ripening with PO miso Q2 hours or less as needed. Pt is agreeable to this.   Reviewed options available to help patient sleep. Would like hydroxyzine HS.  Encouraged " self care including rest overnight as able, oral hydration, and regular light meals.   Pt requests portable monitor. RN notified.   Aware that when labor starts ambulation, hydration, position changes, birthing ball/sling and tub options are good options for comfort and to help facilitate labor.     ANANT PalominoM

## 2020-04-03 ENCOUNTER — ANESTHESIA EVENT (OUTPATIENT)
Dept: OBGYN | Facility: CLINIC | Age: 35
End: 2020-04-03
Payer: COMMERCIAL

## 2020-04-03 ENCOUNTER — ANESTHESIA (OUTPATIENT)
Dept: OBGYN | Facility: CLINIC | Age: 35
End: 2020-04-03
Payer: COMMERCIAL

## 2020-04-03 LAB — T PALLIDUM AB SER QL: NONREACTIVE

## 2020-04-03 PROCEDURE — 25000128 H RX IP 250 OP 636: Performed by: STUDENT IN AN ORGANIZED HEALTH CARE EDUCATION/TRAINING PROGRAM

## 2020-04-03 PROCEDURE — 3E0R3BZ INTRODUCTION OF ANESTHETIC AGENT INTO SPINAL CANAL, PERCUTANEOUS APPROACH: ICD-10-PCS | Performed by: ANESTHESIOLOGY

## 2020-04-03 PROCEDURE — 72200001 ZZH LABOR CARE VAGINAL DELIVERY SINGLE

## 2020-04-03 PROCEDURE — 25000132 ZZH RX MED GY IP 250 OP 250 PS 637: Performed by: ADVANCED PRACTICE MIDWIFE

## 2020-04-03 PROCEDURE — 25000132 ZZH RX MED GY IP 250 OP 250 PS 637: Performed by: MIDWIFE

## 2020-04-03 PROCEDURE — 12000001 ZZH R&B MED SURG/OB UMMC

## 2020-04-03 PROCEDURE — 0KQM0ZZ REPAIR PERINEUM MUSCLE, OPEN APPROACH: ICD-10-PCS | Performed by: ADVANCED PRACTICE MIDWIFE

## 2020-04-03 PROCEDURE — 25000125 ZZHC RX 250: Performed by: STUDENT IN AN ORGANIZED HEALTH CARE EDUCATION/TRAINING PROGRAM

## 2020-04-03 PROCEDURE — 25800030 ZZH RX IP 258 OP 636: Performed by: ADVANCED PRACTICE MIDWIFE

## 2020-04-03 PROCEDURE — 00HU33Z INSERTION OF INFUSION DEVICE INTO SPINAL CANAL, PERCUTANEOUS APPROACH: ICD-10-PCS | Performed by: ANESTHESIOLOGY

## 2020-04-03 PROCEDURE — 25000125 ZZHC RX 250: Performed by: ADVANCED PRACTICE MIDWIFE

## 2020-04-03 PROCEDURE — 25000128 H RX IP 250 OP 636: Performed by: MIDWIFE

## 2020-04-03 RX ORDER — OXYTOCIN/0.9 % SODIUM CHLORIDE 30/500 ML
PLASTIC BAG, INJECTION (ML) INTRAVENOUS
Status: DISCONTINUED
Start: 2020-04-03 | End: 2020-04-03 | Stop reason: HOSPADM

## 2020-04-03 RX ORDER — ACETAMINOPHEN 325 MG/1
650 TABLET ORAL EVERY 4 HOURS PRN
Status: DISCONTINUED | OUTPATIENT
Start: 2020-04-03 | End: 2020-04-04 | Stop reason: HOSPADM

## 2020-04-03 RX ORDER — AMOXICILLIN 250 MG
2 CAPSULE ORAL 2 TIMES DAILY
Status: DISCONTINUED | OUTPATIENT
Start: 2020-04-03 | End: 2020-04-04 | Stop reason: HOSPADM

## 2020-04-03 RX ORDER — LIDOCAINE HYDROCHLORIDE 10 MG/ML
INJECTION, SOLUTION EPIDURAL; INFILTRATION; INTRACAUDAL; PERINEURAL
Status: DISCONTINUED
Start: 2020-04-03 | End: 2020-04-03 | Stop reason: HOSPADM

## 2020-04-03 RX ORDER — MISOPROSTOL 200 UG/1
TABLET ORAL
Status: DISCONTINUED
Start: 2020-04-03 | End: 2020-04-03 | Stop reason: HOSPADM

## 2020-04-03 RX ORDER — AMOXICILLIN 250 MG
1 CAPSULE ORAL 2 TIMES DAILY
Status: DISCONTINUED | OUTPATIENT
Start: 2020-04-03 | End: 2020-04-04 | Stop reason: HOSPADM

## 2020-04-03 RX ORDER — IBUPROFEN 800 MG/1
800 TABLET, FILM COATED ORAL EVERY 6 HOURS PRN
Status: DISCONTINUED | OUTPATIENT
Start: 2020-04-03 | End: 2020-04-04 | Stop reason: HOSPADM

## 2020-04-03 RX ORDER — OXYTOCIN 10 [USP'U]/ML
10 INJECTION, SOLUTION INTRAMUSCULAR; INTRAVENOUS
Status: DISCONTINUED | OUTPATIENT
Start: 2020-04-03 | End: 2020-04-04 | Stop reason: HOSPADM

## 2020-04-03 RX ORDER — EPHEDRINE SULFATE 50 MG/ML
5 INJECTION, SOLUTION INTRAMUSCULAR; INTRAVENOUS; SUBCUTANEOUS
Status: DISCONTINUED | OUTPATIENT
Start: 2020-04-03 | End: 2020-04-04 | Stop reason: HOSPADM

## 2020-04-03 RX ORDER — OXYTOCIN 10 [USP'U]/ML
INJECTION, SOLUTION INTRAMUSCULAR; INTRAVENOUS
Status: DISCONTINUED
Start: 2020-04-03 | End: 2020-04-03 | Stop reason: HOSPADM

## 2020-04-03 RX ORDER — MISOPROSTOL 200 UG/1
400 TABLET ORAL
Status: DISCONTINUED | OUTPATIENT
Start: 2020-04-03 | End: 2020-04-04 | Stop reason: HOSPADM

## 2020-04-03 RX ORDER — NALBUPHINE HYDROCHLORIDE 20 MG/ML
2.5-5 INJECTION, SOLUTION INTRAMUSCULAR; INTRAVENOUS; SUBCUTANEOUS EVERY 6 HOURS PRN
Status: DISCONTINUED | OUTPATIENT
Start: 2020-04-03 | End: 2020-04-04 | Stop reason: HOSPADM

## 2020-04-03 RX ORDER — FENTANYL/BUPIVACAINE/NS/PF 2-1250MCG
PLASTIC BAG, INJECTION (ML) INJECTION CONTINUOUS PRN
OUTPATIENT
Start: 2020-04-03

## 2020-04-03 RX ORDER — BISACODYL 10 MG
10 SUPPOSITORY, RECTAL RECTAL DAILY PRN
Status: DISCONTINUED | OUTPATIENT
Start: 2020-04-05 | End: 2020-04-04 | Stop reason: HOSPADM

## 2020-04-03 RX ORDER — OXYTOCIN/0.9 % SODIUM CHLORIDE 30/500 ML
100 PLASTIC BAG, INJECTION (ML) INTRAVENOUS CONTINUOUS
Status: DISCONTINUED | OUTPATIENT
Start: 2020-04-03 | End: 2020-04-04 | Stop reason: HOSPADM

## 2020-04-03 RX ORDER — FENTANYL CITRATE 50 UG/ML
INJECTION, SOLUTION INTRAMUSCULAR; INTRAVENOUS PRN
OUTPATIENT
Start: 2020-04-03

## 2020-04-03 RX ORDER — HYDROXYZINE HYDROCHLORIDE 50 MG/1
50 TABLET, FILM COATED ORAL
Status: COMPLETED | OUTPATIENT
Start: 2020-04-03 | End: 2020-04-03

## 2020-04-03 RX ORDER — NALOXONE HYDROCHLORIDE 0.4 MG/ML
.1-.4 INJECTION, SOLUTION INTRAMUSCULAR; INTRAVENOUS; SUBCUTANEOUS
Status: DISCONTINUED | OUTPATIENT
Start: 2020-04-03 | End: 2020-04-04 | Stop reason: HOSPADM

## 2020-04-03 RX ORDER — OXYTOCIN/0.9 % SODIUM CHLORIDE 30/500 ML
340 PLASTIC BAG, INJECTION (ML) INTRAVENOUS CONTINUOUS PRN
Status: DISCONTINUED | OUTPATIENT
Start: 2020-04-03 | End: 2020-04-04 | Stop reason: HOSPADM

## 2020-04-03 RX ORDER — LANOLIN 100 %
OINTMENT (GRAM) TOPICAL
Status: DISCONTINUED | OUTPATIENT
Start: 2020-04-03 | End: 2020-04-04 | Stop reason: HOSPADM

## 2020-04-03 RX ORDER — HYDROCORTISONE 2.5 %
CREAM (GRAM) TOPICAL 3 TIMES DAILY PRN
Status: DISCONTINUED | OUTPATIENT
Start: 2020-04-03 | End: 2020-04-04 | Stop reason: HOSPADM

## 2020-04-03 RX ORDER — MORPHINE SULFATE 10 MG/ML
10 INJECTION, SOLUTION INTRAMUSCULAR; INTRAVENOUS ONCE
Status: COMPLETED | OUTPATIENT
Start: 2020-04-03 | End: 2020-04-03

## 2020-04-03 RX ORDER — LIDOCAINE HYDROCHLORIDE AND EPINEPHRINE 15; 5 MG/ML; UG/ML
INJECTION, SOLUTION EPIDURAL PRN
OUTPATIENT
Start: 2020-04-03

## 2020-04-03 RX ADMIN — LIDOCAINE HYDROCHLORIDE 10 ML: 10 INJECTION, SOLUTION EPIDURAL; INFILTRATION; INTRACAUDAL; PERINEURAL at 13:10

## 2020-04-03 RX ADMIN — IBUPROFEN 800 MG: 800 TABLET, FILM COATED ORAL at 14:30

## 2020-04-03 RX ADMIN — Medication 25 MCG: at 01:32

## 2020-04-03 RX ADMIN — IBUPROFEN 800 MG: 800 TABLET, FILM COATED ORAL at 20:52

## 2020-04-03 RX ADMIN — MORPHINE SULFATE 10 MG: 10 INJECTION INTRAVENOUS at 06:14

## 2020-04-03 RX ADMIN — HYDROXYZINE HYDROCHLORIDE 50 MG: 50 TABLET, FILM COATED ORAL at 06:11

## 2020-04-03 RX ADMIN — SODIUM CHLORIDE, POTASSIUM CHLORIDE, SODIUM LACTATE AND CALCIUM CHLORIDE: 600; 310; 30; 20 INJECTION, SOLUTION INTRAVENOUS at 09:28

## 2020-04-03 RX ADMIN — Medication 340 ML/HR: at 13:04

## 2020-04-03 RX ADMIN — SENNOSIDES AND DOCUSATE SODIUM 2 TABLET: 8.6; 5 TABLET ORAL at 20:53

## 2020-04-03 RX ADMIN — FENTANYL CITRATE 16 MCG: 50 INJECTION, SOLUTION INTRAMUSCULAR; INTRAVENOUS at 09:08

## 2020-04-03 RX ADMIN — LIDOCAINE HYDROCHLORIDE,EPINEPHRINE BITARTRATE 1.5 ML: 15; .005 INJECTION, SOLUTION EPIDURAL; INFILTRATION; INTRACAUDAL; PERINEURAL at 09:04

## 2020-04-03 RX ADMIN — SODIUM CHLORIDE, POTASSIUM CHLORIDE, SODIUM LACTATE AND CALCIUM CHLORIDE 1000 ML: 600; 310; 30; 20 INJECTION, SOLUTION INTRAVENOUS at 08:43

## 2020-04-03 RX ADMIN — Medication: at 09:10

## 2020-04-03 RX ADMIN — Medication 10 ML/HR: at 09:09

## 2020-04-03 RX ADMIN — Medication 8 ML: at 09:08

## 2020-04-03 RX ADMIN — Medication 25 MCG: at 06:05

## 2020-04-03 NOTE — PROGRESS NOTES
"Blood pressure 113/67, pulse 88, temperature 98.1  F (36.7  C), temperature source Oral, resp. rate 16, height 1.702 m (5' 7\"), weight 78.5 kg (173 lb), last menstrual period 06/22/2019, not currently breastfeeding. Remains afebrile.     General appearance: comfortable. Some cramping and contractions felt. Having difficulty sleeping.   RN asked patient if she is interested in reviewing option for morphine to help with cramping and to facilitate sleep. Pt declined at this time.     CONTRACTIONS: 3 in 10 mins, lasting 40-90 seconds, mild  PO Miso dose #4 at 0132    FETAL HEART TONES: continuous EFM- baseline 140 with moderate variability and positive accelerations. No decelerations.  ROM: clear fluid  PELVIC EXAM:deferred    ASSESSMENT:  ==============  IUP @ 38w5d SROM x 17 hours and for induction of labor.  Indication: SROM without labor   Fetal Heart Rate Tracing category one  GBS- negative  Patient Active Problem List   Diagnosis     Generalized anxiety disorder     Migraine aura without headache     Encounter for supervision of normal first pregnancy in third trimester - [facility] CNM     Hx of abnormal cervical Pap smear     Carpal tunnel syndrome during pregnancy     Labor and delivery, indication for care     PLAN:  ===========  Rest/sleep as able  Morphine for therapeutic sleep if desired  Continue PO miso per protocol   Reevaluate in 2-4 hours and prn     ANANT Palomino CNM  "

## 2020-04-03 NOTE — PROVIDER NOTIFICATION
04/03/20 1058   Provider Notification   Provider Name/Title Shraddha DAVISON   Method of Notification Electronic Page   Notification Reason Status Update   Her ctx have . Readjusted toco. They are  5-8 min apart.

## 2020-04-03 NOTE — PLAN OF CARE
Data: Deb Olivera transferred to [id number] via wheelchair at 1530. Baby transferred via parent's arms.  Action: Receiving unit notified of transfer: Yes. Patient and family notified of room change. Report given to Alexandria URIOSTEGUI at 1540. Belongings sent to receiving unit. Accompanied by Registered Nurse. Oriented patient to surroundings. Call light within reach. ID bands double-checked with receiving RN.  Response: Patient tolerated transfer and is stable.

## 2020-04-03 NOTE — PLAN OF CARE
Patient reporting increased pain and discomfort.  Requesting an epidural for pain relief. Dr Armando HITCHCOCK called and in room at 0845. Patient and procedure correctly identified/ verified with NALDO. Consent signed. 1000 mL fluid bolus given prior to epidural placement.  Epidural placed by NALDO without complication. Test dose/ bolus given by NALDO, patient tolerated well. Patient reports less pain after procedure, still feeling pressure.

## 2020-04-03 NOTE — PLAN OF CARE
of viable female with SENAIT Llanes CNM in attendance. Nursery RN Neeru VARGAS present. Infant with spontaneous cry to mothers abdomen, dried and stimulated.  Placenta delivered without complications, pitocin bolused,  laceration, repairs done. Nayely cares provided. Mother and baby in stable condition.

## 2020-04-03 NOTE — PROVIDER NOTIFICATION
04/03/20 0525   Provider Notification   Provider Name/Title Lucía Mcclellan CNM    Method of Notification Electronic Page   Request Evaluate - Remote   Notification Reason Status Update   Gave jemal kiah at 0132. Pt states contractions are getting stronger. Pt also curious of plan at this point.

## 2020-04-03 NOTE — PROGRESS NOTES
Patient arrived to [facility] unit via wheelchair at 1530,with belongings, accompanied by spouse/ significant other, with infant in arms. Received report from Shwetha VARGAS, and checked bands. Unit and room orientation started. Call light given; no concerns present at this time. Continue with plan of care.

## 2020-04-03 NOTE — PROGRESS NOTES
"Blood pressure 111/65, pulse 116, temperature 98.3  F (36.8  C), temperature source Oral, resp. rate 16, height 1.702 m (5' 7\"), weight 78.5 kg (173 lb), last menstrual period 06/22/2019, not currently breastfeeding.    General appearance: uncomfortable with contractions. Side lying in bed. Appears relaxed, but noticing each contraction. Would like to discuss options for pain management, specifically medications that will help her sleep.        CONTRACTIONS: every 2-4 minutes, lasting 50-80 seconds, palpate mild  PO miso x4, last was at 0132   FETAL HEART TONES: continuous EFM- baseline 145 with moderate variability and positive accelerations. No decelerations.  ROM: clear fluid  PELVIC EXAM:deferred    ASSESSMENT:  ==============  IUP @ 38w5d SROM x 21 hours and for induction of labor.  Indication: SROM without labor   Fetal Heart Rate Tracing category one  GBS- negative  Patient Active Problem List   Diagnosis     Generalized anxiety disorder     Migraine aura without headache     Encounter for supervision of normal first pregnancy in third trimester - [facility] CNM     Hx of abnormal cervical Pap smear     Carpal tunnel syndrome during pregnancy     Labor and delivery, indication for care     PLAN:  ===========  Reviewed r/b/a for pain medication options including morphine IM, fentanyl IV, and epidural. Discussed hydrotherapy, movement, music, or hypnobirthing techniques as alternatives to medication options as mentioned in patient's birth plan. Pt states she is too tired for movement and not in the head space to focus on hypnobirthing. Reviewed that with early labor status and maternal exhaustion due to lack of sleep, morphine may be best option. Pt is in agreement and requests to try this.   10mg IM morphine and additional 50mg hydroxyzine ordered   Continue cervical ripening with Misoprostol oral   Reevaluate in 2-4 hours prn    ANANT Palomino CNM  "

## 2020-04-03 NOTE — ANESTHESIA PREPROCEDURE EVALUATION
"Anesthesia Pre-Procedure Evaluation    Patient: Deb Olivera   MRN:     [id number] Gender:   female   Age:    34 year old :      1985        Preoperative Diagnosis: * No pre-op diagnosis entered *   * No procedures listed *     LABS:  CBC:   Lab Results   Component Value Date    WBC 12.6 (H) 2020    WBC 13.0 (H) 2020    HGB 12.0 2020    HGB 11.7 2020    HCT 36.6 2020    HCT 35.3 2020     2020     2020     BMP:   Lab Results   Component Value Date     2020     2018    POTASSIUM 3.9 2020    POTASSIUM 3.7 2018    CHLORIDE 108 2020    CHLORIDE 104 2018    CO2 24 2020    CO2 24 2018    BUN 7 2020    BUN 7 2018    CR 0.52 2020    CR 0.66 2018    GLC 85 2020    GLC 91 2018     COAGS: No results found for: PTT, INR, FIBR  POC:   Lab Results   Component Value Date    HCG Positive (A) 2019     OTHER:   Lab Results   Component Value Date    CLAU 8.4 (L) 2020    ALBUMIN 4.3 2018    PROTTOTAL 8.0 2018    ALT 20 2018    AST 22 2018    GGT 21 2011    ALKPHOS 40 2018    BILITOTAL 0.7 2018    LIPASE 147 2019    AMYLASE 55 2011    TSH 0.69 2020    CRP <2.9 2019    SED 10 2019        Preop Vitals    BP Readings from Last 3 Encounters:   20 118/73   20 102/71   20 130/82    Pulse Readings from Last 3 Encounters:   20 116   20 104   20 112      Resp Readings from Last 3 Encounters:   20 18   19 16   19 16    SpO2 Readings from Last 3 Encounters:   19 100%   19 100%   19 99%      Temp Readings from Last 1 Encounters:   20 36.6  C (97.8  F)    Ht Readings from Last 1 Encounters:   20 1.702 m (5' 7\")      Wt Readings from Last 1 Encounters:   20 78.5 kg (173 lb)    Estimated body mass index is 27.1 kg/m  as " "calculated from the following:    Height as of this encounter: 1.702 m (5' 7\").    Weight as of this encounter: 78.5 kg (173 lb).     LDA:  Peripheral IV 04/02/20 Left Lower forearm (Active)   Site Assessment WDL 04/03/20 0823   Line Status Infusing 04/03/20 0823   Phlebitis Scale 0-->no symptoms 04/03/20 0823   Infiltration Scale 0 04/03/20 0823   Number of days: 1       Intrathecal/Epidural Catheter 04/03/20 (Active)   Number of days: 0        Past Medical History:   Diagnosis Date     Hx of abnormal pap 04/2014    LSIL, colpo CIN1      Past Surgical History:   Procedure Laterality Date     ESOPHAGOSCOPY, GASTROSCOPY, DUODENOSCOPY (EGD), COMBINED N/A 1/7/2019    Procedure: COMBINED ESOPHAGOSCOPY, GASTROSCOPY, DUODENOSCOPY (EGD);  Surgeon: Vinay Childers MD;  Location: [facility]      No Known Allergies     Anesthesia Evaluation       history and physical reviewed .             ROS/MED HX    ENT/Pulmonary:  - neg pulmonary ROS     Neurologic:  - neg neurologic ROS     Cardiovascular:  - neg cardiovascular ROS       METS/Exercise Tolerance:     Hematologic:         Musculoskeletal:         GI/Hepatic:  - neg GI/hepatic ROS       Renal/Genitourinary:         Endo:         Psychiatric:         Infectious Disease:         Malignancy:         Other:                     JZG FV AN PHYSICAL EXAM    Assessment:   ASA SCORE: 2 emergent   H&P: History and physical reviewed and following examination; no interval change.   Smoking Status:  Non-Smoker/Unknown   NPO Status: ELEVATED Aspiration Risk/Unknown     Plan:   Anes. Type:  Epidural     Epidural Details:  Catheter; Lumbar   Pre-Medication: None   Induction:  N/a   Airway: Native Airway   Access/Monitoring: PIV   Maintenance: N/a     Postop Plan:   Postop Pain: Regional  Postop Sedation/Airway: Not planned  Disposition: Inpatient/Admit     PONV Management:   Adult Risk Factors: Female, Non-Smoker             neg OB ROS             Miryam Beltran MD  "

## 2020-04-03 NOTE — PLAN OF CARE
Pt given PO miso x5. Pt states she is cramping more painfully since starting miso and was unable to sleep. She agreed to therapeutic rest this morning. Leaking small amounts of clear fluid. Cat 1 strip. Elan STEPHENSON at bedside.

## 2020-04-03 NOTE — PROGRESS NOTES
"Blood pressure 110/68, temperature 98  F (36.7  C), temperature source Oral, resp. rate 16, height 1.702 m (5' 7\"), weight 78.5 kg (173 lb), last menstrual period 06/22/2019, not currently breastfeeding.    General appearance: comfortable but noticing some cramping. States its just enough to keep her awake. Patient just requested hydroxyzine.     Miso PO at 1904, 2057, 2318    CONTRACTIONS: irreg, mild   FETAL HEART TONES: continuous EFM- baseline 135 with moderate variability and positive accelerations. No decelerations.  ROM: leaking clear fluid since 1900  PELVIC EXAM:deferred    ASSESSMENT:  ==============  IUP @ 38w4d SROM x 14 hours and for induction of labor.  Indication: SROM without labor   Fetal Heart Rate Tracing category one  GBS- negative  Patient Active Problem List   Diagnosis     Generalized anxiety disorder     Migraine aura without headache     Encounter for supervision of normal first pregnancy in third trimester - [facility] CNM     Hx of abnormal cervical Pap smear     Carpal tunnel syndrome during pregnancy     Labor and delivery, indication for care     PLAN:  ===========  Patient just requested and received hydroxyzine form RN and plans to try to sleep now.   Continue PO miso every 2 hours per protocol   Reevaluate in 3-4 hours or sooner as needed     ANANT Palomino CNM  "

## 2020-04-03 NOTE — PLAN OF CARE
VSS. IOL w/PO miso started at 1900. Continued LOF throughout the day, scant & clear. TOCO tracing occ ctx, pt reports some tightness and cramping. FHR monitored normal baseline w/accels and moderate variability, see flowsheet for detail. Pt desires a non-medicated labor. Birth plan is included in chart.  is supportive at bedside. Anticipate ; continue to monitor.

## 2020-04-03 NOTE — ANESTHESIA PROCEDURE NOTES
Combined Spinal/Epidural procedure note    Staff -   Anesthesiologist:  Yana Roberts MD  Resident/Fellow: Miryam Beltran MD    Performed By: resident  Procedure performed by resident/CRNA in presence of a teaching physician.        Location:  OB  Procedure start time:  4/3/2020 8:50 AM  Procedure end time:  4/3/2020 9:10 AM    Timeout  patient identified, IV checked, site marked, risks and benefits discussed, informed consent, monitors and equipment checked, pre-op evaluation, at physician/surgeon's request and post-op pain management    Correct Patient: Yes    Correct Position: Yes    Correct Site: Yes    Correct Procedure: Yes    Correct Laterality:  Yes  Site Marked:  Yes    Procedure details  Procedure:  Combined Spinal/Epidural (CSE) (Dural puncture epidural)  Position:  Sitting  ASA:  2 and Emergent  Sterile Prep: chloraprep    Local skin infiltration:  1% lidocaine  amount (mL):  3  Approach:  Midline  Epidural Needle Type:  [employer]  Needle gauge (G):  17  Needle length (in):  3.5  Injection Technique:  LORT saline  DENNIS at (cm):  5  Attempts:  1  Spinal Needle (G):  25  Spinal Needle Type:  [place]  Spinal Needle Length (in):  5  Catheter gauge (G):  19  Catheter threaded easily: Yes    Threaded to skin (cm) :  10  Threaded in epidural space (cm):  5  Paresthesias:  No  CSF with Epidural needle: No    CSF with Spinal needle: Yes    Aspiration negative for Heme or CSF: Yes    Test dose (mL):  3  Local anesthetic for test dose:  Lidocaine 1.5% w/ 1:200,000 epinephrine  Test dose time:  09:04  Test dose negative for signs of intravascular, subdural or intrathecal injection: Yes

## 2020-04-03 NOTE — PROGRESS NOTES
"Blood pressure 111/65, pulse 116, temperature 97.8  F (36.6  C), resp. rate 16, height 1.702 m (5' 7\"), weight 78.5 kg (173 lb), last menstrual period 2019, not currently breastfeeding.    Partner has arrived.   General appearance: comfortable  CONTRACTIONS: irreg and mild  Pitocin- none,  Antibiotics- none  FETAL HEART TONES: continuous EFM- baseline 145 with moderate variability and positive accelerations. No decelerations.  ROM: clear fluid  PELVIC EXAM: deferred    ASSESSMENT:  ==============  IUP @ 38w5d with SROM without labor   Fetal Heart Rate Tracing category one  GBS- negative    PLAN:  ===========  Reviewed plan of care with patient and partner. Answered all patient and partner's questions.   Ambulation, hydration, position changes, birthing ball/sling and tub options to facilitate labor.  Reviewed r/b/a of cervical ripening vs. Expectant management. Reviewed risk of infection with prolonged ROM. Reviewed risks of  and indications.  Pt and partner would like to continue with expectant for additional 3-4 hours.  Plan to re-evaluate at that time.  Reevaluate in 2-4 hours ANANT Gutierrez CNM    "

## 2020-04-03 NOTE — PROGRESS NOTES
"[facility] Labor and Delivery Progress Note    Deb Olivera MRN# [id number]   Age: 34 year old YOB: 1985         Subjective:   Pt given Morphine and Vistaril at 0615 for increased pain, she awoke about 10 minutes ago experiencing increased pain and requesting assessment for an epidural.            Objective:     Patient Vitals for the past 24 hrs:   BP Temp Temp src Pulse Resp Height Weight BMI (Calculated) Oximeter Heart Rate   20 0756 -- 97.8  F (36.6  C) -- -- -- -- -- -- --   20 0605 -- 97.9  F (36.6  C) Oral -- -- -- -- -- --   20 0443 111/65 98.3  F (36.8  C) Oral 116 16 -- -- -- --   20 0308 -- 98.4  F (36.9  C) Oral -- -- -- -- -- --   20 0210 -- 98.1  F (36.7  C) Oral -- -- -- -- -- --   20 0110 -- 98.1  F (36.7  C) Oral -- -- -- -- -- --   20 0010 -- 98  F (36.7  C) Oral -- -- -- -- -- --   20 2320 113/67 97.4  F (36.3  C) Oral 88 16 -- -- -- --   20 2230 -- 98  F (36.7  C) Oral -- -- -- -- -- --   20 2130 -- 98.1  F (36.7  C) Oral -- -- -- -- -- --   20 2030 -- 97.6  F (36.4  C) Oral -- -- -- -- -- --   20 1930 110/68 97.7  F (36.5  C) Oral -- 16 -- -- -- 99 bpm   20 1830 -- 98.1  F (36.7  C) Oral -- -- -- -- -- --   20 1730 -- 97.4  F (36.3  C) Oral -- -- -- -- -- --   20 1630 -- 97.9  F (36.6  C) Oral -- -- -- -- -- --   20 1530 115/65 97.9  F (36.6  C) Oral -- 16 -- -- -- 100 bpm   20 1102 -- 98.4  F (36.9  C) Oral -- 16 1.702 m (5' 7\") 78.5 kg (173 lb) 27.1 --         Cervical Exam:     Position: Anterior and Mid    Membranes: Leaking     Fetal Heart Rate:    Monitor: external US    Variability: moderate (amplitude range 6 to 25 bpm)    Baseline Rate: normal range    Fetal Heart Rate Tracin  Contractions: 2-3 minutes, lasting 90 seconds, strong    Last dose of miso at 0605      Assessment:   Deb Olivera is a 34 year old  who is 38w5d here with " induction/augmentation of labor post PROM.           Plan:   LR bolus started for epidural, MDA called  Started hypnobirthing CD and labor coaching from CNM  Utilizing ball and position changes  Anticipate       Eden Llanes

## 2020-04-03 NOTE — L&D DELIVERY NOTE
Delivery Summary    Deb Olivera MRN# [id number]   Age: 34 year old YOB: 1985   DELIVERY NOTE:  Deb Olivera is a 34 year old  at 38w5d who presented to labor floor with c/o PROM.  Time of rupture:20@0900 Progressed to complete at 1130 on 4/3/20 .  Pushed effectively with CNM coaching. FHR with one prolonged decel with pushing effort, moderate variability throughout. Head delivered OA and restituted to CLARISSA . No nuchal cord, posterior nuchal hand. Shoulders easily delivered under maternal effort.  Live female  delivered at 1302 over an 2nd lacerated perineum under epidural anesthesia.  Spontaneous breath, vigorous cry, well flexed, HR>100. Infant directly to maternal abdomen, skin to skin. Delayed cord clamping for >2 minutes then clamped x2 and cut by Elan (FOB). 20 units of pitocin infusing after baby with pt's consent. Cord blood obtained for typing. Intact placenta spontaneously delivered via [place] at 1311.  3 vessel cord. Fundus firm @ U/3 after IV pitocin for AMSTL with pt consent. Vagina, perineum, and rectum inspected 2nd degree laceration repaired with a 3-0 vicryl in the usual fashion. Hemostasis noted. Mother and infant stable; continued skin to skin. Good family bonding observed.     Apgars 9/9.  Weight 3374gm/8als2hr.         Delivery Note:   IUP at 38 weeks 5 days gestation delivered on April 3, 2020.     delivery of a viable Female infant.  Weight : 7 pounds 7 ounces   Apgars of 9 at 1 minute and 9 at 5 minutes.  Labor was augmented.  Medications administered  in labor:  Pain Rx Epidural; Antibiotics No; Other no  Perineum: 2nd degree repaired by Eden Llanes CNM  Placenta-mechanism: spontaneous, intact,  with a 3 vessel cord. IV oxytocin was given After delivery of baby  Quantitative Blood Loss was 184 cc.   Complications of labor and delivery: None  Anticipated Discharge Date: 4/3/20  Birth attendants: SAMAN Bland,  SAMAN    ASSESSMENT & PLAN:  at term.       Labor Event Times    Labor onset date:  4/3/20 Onset time:   4:00 AM   Dilation complete date:  4/3/20 Complete time:  11:30 AM   Start pushing date/time:  4/3/2020 1137      Labor Events     labor?:  No   steroids:  None  Labor Type:  Augmentation     Antibiotics received during labor?:  No     Rupture date/time: 20 0900   Rupture type:  Spontaneous rupture of membranes occuring during spontaneous labor or augmentation  Fluid color:  Clear, Pink  Fluid odor:  Normal     Indications for augmentation:  Prolonged ROM  1:1 continuous labor support provided by?:  RN       Delivery/Placenta Date and Time    Delivery Date:  4/3/20 Delivery Time:   1:02 PM   Placenta Date/Time:  4/3/2020  1:11 PM  Oxytocin given at the time of delivery:  after delivery of baby     Vaginal Counts     Initial count performed by 2 team members:   Two Team Members   MANDO Llanes CNM       [place] Suture [place] [employer] Instruments   Initial counts 2  5    Added to count  1     Final counts 2 1 5    Placed during labor Accounted for at the end of labor   NA    NA    NA     Final count performed by 2 team members:   Two Team Members   MANDO Llanes CNM      Final count correct?:  Yes     Apgars     1 Minute 5 Minute 10 Minute 15 Minute 20 Minute   Skin color: 1  1       Heart rate: 2  2       Reflex irritability: 2  2       Muscle tone: 2  2       Respiratory effort: 2  2       Total: 9  9       Apgars assigned by:  FIDEL BULL RN     Cord    Vessels:  3 Vessels Complications:  None   Cord Blood Disposition:  Lab Gases Sent?:  No      [place] Resuscitation    Methods:  None   Care at Delivery:  Data: Female infant born at 1302.  Action: No interventions needed.  Spontaneous cry, stimulated, placed on mothers abdomen. Delayed cord clamping. Cord cut. Placed on mothers chest, warm blankets. applied, hat applied.  Response: Stable [place].  "Positive bonding behaviors observed.      Measurements    Weight:  7 lb 7 oz Length:  1' 9\"   Head circumference:  33.7 cm       Skin to Skin and Feeding Plan    Skin to skin initiation date/time: 1841    Skin to skin with:  Mother  Skin to skin end date/time: 1841    Breastfeeding initiated date/time:  4/3/2020 1320  How do you plan to feed your baby:  Breastfeeding     Labor Events and Shoulder Dystocia    Fetal Tracing Prior to Delivery:  Category 1  Shoulder dystocia present?:  Neg     Delivery (Maternal) (Provider to Complete) ([id number])    Episiotomy:  None  Perineal lacerations:  2nd Repaired?:  Yes      Blood Loss  Mother: Deb Olivera #[id number]   Start of Mother's Information    IO Blood Loss  20 0400 - 20 1535    Delivery QBL (mL) Hospital Encounter 184 mL    Total  184 mL         End of Mother's Information  Mother: Deb Olivera #[id number]         Delivery - Provider to Complete ([id number])    Delivering clinician:  Eden Llanes CNM  CNMANDO Care:  Exclusive CNM care in labor  Attempted Delivery Types (Choose all that apply):  Spontaneous Vaginal Delivery  Delivery Type (Choose the 1 that will go to the Birth History):  Vaginal, Spontaneous   Other personnel:   Provider Role   Shwetha Ramirez, RN Delivery Nurse         Placenta    Delayed Cord Clamping:  Done  Date/Time:  4/3/2020  1:11 PM  Removal:  Spontaneous  Comments:  Via Kishan  Disposition:  Hospital disposal     Anesthesia    Method:  Epidural  Cervical dilation at placement:  8-10          Presentation and Position    Presentation:  Vertex  Position:  Right Occiput Transverse           Eden Llanes CNM  "

## 2020-04-04 VITALS
BODY MASS INDEX: 27.15 KG/M2 | DIASTOLIC BLOOD PRESSURE: 62 MMHG | SYSTOLIC BLOOD PRESSURE: 101 MMHG | HEART RATE: 116 BPM | HEIGHT: 67 IN | TEMPERATURE: 98.1 F | RESPIRATION RATE: 16 BRPM | WEIGHT: 173 LBS | OXYGEN SATURATION: 98 %

## 2020-04-04 LAB — HGB BLD-MCNC: 9.8 G/DL (ref 11.7–15.7)

## 2020-04-04 PROCEDURE — 36415 COLL VENOUS BLD VENIPUNCTURE: CPT | Performed by: ADVANCED PRACTICE MIDWIFE

## 2020-04-04 PROCEDURE — 25000132 ZZH RX MED GY IP 250 OP 250 PS 637: Performed by: ADVANCED PRACTICE MIDWIFE

## 2020-04-04 PROCEDURE — 85018 HEMOGLOBIN: CPT | Performed by: ADVANCED PRACTICE MIDWIFE

## 2020-04-04 RX ORDER — AMOXICILLIN 250 MG
1 CAPSULE ORAL DAILY
Qty: 100 TABLET | Refills: 0 | Status: SHIPPED | OUTPATIENT
Start: 2020-04-04 | End: 2020-10-28

## 2020-04-04 RX ORDER — IBUPROFEN 600 MG/1
600 TABLET, FILM COATED ORAL EVERY 6 HOURS PRN
Qty: 60 TABLET | Refills: 0 | Status: SHIPPED | OUTPATIENT
Start: 2020-04-04 | End: 2020-10-28

## 2020-04-04 RX ORDER — FERROUS SULFATE 325(65) MG
325 TABLET ORAL
Qty: 30 TABLET | Refills: 0 | Status: SHIPPED | OUTPATIENT
Start: 2020-04-04 | End: 2020-05-21

## 2020-04-04 RX ORDER — ACETAMINOPHEN 325 MG/1
650 TABLET ORAL EVERY 6 HOURS PRN
Qty: 100 TABLET | Refills: 0 | Status: SHIPPED | OUTPATIENT
Start: 2020-04-04 | End: 2021-05-05

## 2020-04-04 RX ADMIN — ACETAMINOPHEN 650 MG: 325 TABLET ORAL at 16:16

## 2020-04-04 RX ADMIN — SENNOSIDES AND DOCUSATE SODIUM 2 TABLET: 8.6; 5 TABLET ORAL at 07:47

## 2020-04-04 RX ADMIN — ACETAMINOPHEN 650 MG: 325 TABLET ORAL at 12:12

## 2020-04-04 RX ADMIN — IBUPROFEN 800 MG: 800 TABLET, FILM COATED ORAL at 16:16

## 2020-04-04 RX ADMIN — IBUPROFEN 800 MG: 800 TABLET, FILM COATED ORAL at 03:35

## 2020-04-04 RX ADMIN — ACETAMINOPHEN 650 MG: 325 TABLET ORAL at 07:47

## 2020-04-04 RX ADMIN — IBUPROFEN 800 MG: 800 TABLET, FILM COATED ORAL at 09:28

## 2020-04-04 NOTE — PLAN OF CARE
Oriented to room and unit routines and welcome folder (reviewed birth certificate and EDS).  VSS and postpartum assessments WDL.  Up ad mina with steady gait.  Independent with cares, showered this evening.  Voiding without difficulty.  Bonding well with infant.  Breastfeeding on cue with assist.  Pain managed with ibuprofen per MAR.  PIV saline locked.  , Elan present and supportive.  Resting between cares and feedings.  Will continue with postpartum cares and education per plan of care.

## 2020-04-04 NOTE — PLAN OF CARE
VSS. Fundus Midline, firm, and U/U. Lochia scant. Pain adequately managed with tylenol and ibuprofen. Voiding without difficulty. Breastfeeding independently with good latch. Bonding well with . Continue with plan of care.

## 2020-04-04 NOTE — PLAN OF CARE
Patient has normal postpartum assessment, VSS. Bleeding within expected ranges. Pain controlled with Ibuprofen alone. Ambulating well and voiding without difficulty. Breastfeeding baby with minimal assistance. Continue with POC.

## 2020-04-04 NOTE — PLAN OF CARE
Home care referral placed through [facility] for first time mother breastfeeding.

## 2020-04-04 NOTE — PLAN OF CARE
Pt has been stable and independent with cares.  Breastfeeding independently.  Pain managed with tylenol and ibuprofen per MAR.  Reviewed discharge instructions and answered all questions.  Discharge medications sent to [facility] pharmacy in [place] per pt request.  Pt discharged home with infant and all belongings at 1830.

## 2020-04-16 ASSESSMENT — ANXIETY QUESTIONNAIRES
3. WORRYING TOO MUCH ABOUT DIFFERENT THINGS: SEVERAL DAYS
GAD7 TOTAL SCORE: 5
7. FEELING AFRAID AS IF SOMETHING AWFUL MIGHT HAPPEN: SEVERAL DAYS
4. TROUBLE RELAXING: SEVERAL DAYS
7. FEELING AFRAID AS IF SOMETHING AWFUL MIGHT HAPPEN: SEVERAL DAYS
6. BECOMING EASILY ANNOYED OR IRRITABLE: SEVERAL DAYS
1. FEELING NERVOUS, ANXIOUS, OR ON EDGE: SEVERAL DAYS
2. NOT BEING ABLE TO STOP OR CONTROL WORRYING: NOT AT ALL
5. BEING SO RESTLESS THAT IT IS HARD TO SIT STILL: NOT AT ALL
GAD7 TOTAL SCORE: 5

## 2020-04-17 ASSESSMENT — ANXIETY QUESTIONNAIRES: GAD7 TOTAL SCORE: 5

## 2020-04-24 ENCOUNTER — OFFICE VISIT (OUTPATIENT)
Dept: OBGYN | Facility: CLINIC | Age: 35
End: 2020-04-24
Attending: ADVANCED PRACTICE MIDWIFE
Payer: COMMERCIAL

## 2020-04-24 NOTE — LETTER
"2020       RE: Deb Olivera  [address]  [place] [address]     Dear Colleague,    Thank you for referring your patient, Deb Olivera, to the [facility] at [facility]. Please see a copy of my visit note below.    Deb Olivera is a 34 year old female who is being evaluated via a billable telephone visit.      The patient has been notified of following:     \"This telephone visit will be conducted via a call between you and your physician/provider. We have found that certain health care needs can be provided without the need for a physical exam.  This service lets us provide the care you need with a short phone conversation.  If a prescription is necessary we can send it directly to your pharmacy.  If lab work is needed we can place an order for that and you can then stop by our lab to have the test done at a later time.    If during the course of the call the physician/provider feels a telephone visit is not appropriate, you will not be charged for this service.\"     Deb Olivera complains of  No chief complaint on file.    I have reviewed and updated the patient's Past Medical History, Social History, Family History and Medication List.    ALLERGIES  Patient has no known allergies.  SUBJECTIVE  34 year old  presents for 2 week post partum visit s/p SVB  delivery on 4/3/2020 for breastfeeding and mood check.  Pt is doing well. Breastfeeding with good latch to both breasts. No nipple pain. Lochia Coping well with support from spouse .   Baby is possibly having reflux BB girl  Kay. She is waiting for RX to be sent for her. She has noticed some increased anxiety when the baby is sleeping in her room. She sleeps better when baby is in her own room  reassurred pt this is ok and mom  Needs restful sleep too.  Baby has been quite fussy when laying down due to reflux  Pt has talked with peds and lactation consultant and has received helpful " tips   Has a therapist but is now looking for someone that deals speifically with PP issues.  Does not feel depressed and feels like she is managing spouse is home supportive.  Denies feeling the need for medication  Pt has used in past but its been years pt plans possibly IUD placement at PP appt.  Not quite sure  Questions answered re: options   OBJECTIVE  Virtual visit   [facility] 06/22/2019 (Approximate)      ASSESSMENT/PLAN  2 weeks postpartum s/p vaginal  delivery  - No signs or symptoms of PPD. Breastfeeding   - RTO in 3 weeks for 6 week check and prn if questions or concerns  Assessment/Plan:  1. Routine postpartum follow-up        Phone call duration:  20 minutes    ANANT Gunter CNM

## 2020-04-24 NOTE — PROGRESS NOTES
"Deb Olivera is a 34 year old female who is being evaluated via a billable telephone visit.      The patient has been notified of following:     \"This telephone visit will be conducted via a call between you and your physician/provider. We have found that certain health care needs can be provided without the need for a physical exam.  This service lets us provide the care you need with a short phone conversation.  If a prescription is necessary we can send it directly to your pharmacy.  If lab work is needed we can place an order for that and you can then stop by our lab to have the test done at a later time.    If during the course of the call the physician/provider feels a telephone visit is not appropriate, you will not be charged for this service.\"     Deb Olivera complains of  No chief complaint on file.    I have reviewed and updated the patient's Past Medical History, Social History, Family History and Medication List.    ALLERGIES  Patient has no known allergies.  SUBJECTIVE  34 year old  presents for 2 week post partum visit s/p SVB  delivery on 4/3/2020 for breastfeeding and mood check.  Pt is doing well. Breastfeeding with good latch to both breasts. No nipple pain. Lochia Coping well with support from spouse .   Baby is possibly having reflux BB girl  Kay. She is waiting for RX to be sent for her. She has noticed some increased anxiety when the baby is sleeping in her room. She sleeps better when baby is in her own room  reassurred pt this is ok and mom  Needs restful sleep too.  Baby has been quite fussy when laying down due to reflux  Pt has talked with peds and lactation consultant and has received helpful tips   Has a therapist but is now looking for someone that deals speifically with PP issues.  Does not feel depressed and feels like she is managing spouse is home supportive.  Denies feeling the need for medication  Pt has used in past but its been years pt plans possibly IUD placement " at PP appt.  Not quite sure  Questions answered re: options   OBJECTIVE  Virtual visit   [facility] 06/22/2019 (Approximate)      ASSESSMENT/PLAN  2 weeks postpartum s/p vaginal  delivery  - No signs or symptoms of PPD. Breastfeeding   - RTO in 3 weeks for 6 week check and prn if questions or concerns  Assessment/Plan:  1. Routine postpartum follow-up        Phone call duration:  20 minutes    ANANT GunterM    Answers for HPI/ROS submitted by the patient on 4/16/2020   BRADLEY 7 TOTAL SCORE: 5

## 2020-05-11 ASSESSMENT — ANXIETY QUESTIONNAIRES
7. FEELING AFRAID AS IF SOMETHING AWFUL MIGHT HAPPEN: SEVERAL DAYS
3. WORRYING TOO MUCH ABOUT DIFFERENT THINGS: NOT AT ALL
2. NOT BEING ABLE TO STOP OR CONTROL WORRYING: NOT AT ALL
5. BEING SO RESTLESS THAT IT IS HARD TO SIT STILL: NOT AT ALL
6. BECOMING EASILY ANNOYED OR IRRITABLE: MORE THAN HALF THE DAYS
7. FEELING AFRAID AS IF SOMETHING AWFUL MIGHT HAPPEN: SEVERAL DAYS
GAD7 TOTAL SCORE: 5
4. TROUBLE RELAXING: SEVERAL DAYS
GAD7 TOTAL SCORE: 5
1. FEELING NERVOUS, ANXIOUS, OR ON EDGE: SEVERAL DAYS

## 2020-05-12 ASSESSMENT — ANXIETY QUESTIONNAIRES: GAD7 TOTAL SCORE: 5

## 2020-05-21 ENCOUNTER — OFFICE VISIT (OUTPATIENT)
Dept: OBGYN | Facility: CLINIC | Age: 35
End: 2020-05-21
Attending: ADVANCED PRACTICE MIDWIFE
Payer: COMMERCIAL

## 2020-05-21 VITALS
WEIGHT: 155.2 LBS | HEART RATE: 81 BPM | HEIGHT: 67 IN | SYSTOLIC BLOOD PRESSURE: 106 MMHG | DIASTOLIC BLOOD PRESSURE: 69 MMHG | BODY MASS INDEX: 24.36 KG/M2

## 2020-05-21 DIAGNOSIS — O26.899 CARPAL TUNNEL SYNDROME DURING PREGNANCY: ICD-10-CM

## 2020-05-21 DIAGNOSIS — Z30.09 BIRTH CONTROL COUNSELING: ICD-10-CM

## 2020-05-21 DIAGNOSIS — Z91.89 AT RISK FOR INEFFECTIVE BREASTFEEDING: ICD-10-CM

## 2020-05-21 DIAGNOSIS — Z87.42 HX OF ABNORMAL CERVICAL PAP SMEAR: ICD-10-CM

## 2020-05-21 DIAGNOSIS — Z91.89 AT RISK FOR POSTPARTUM DEPRESSION: ICD-10-CM

## 2020-05-21 DIAGNOSIS — G56.00 CARPAL TUNNEL SYNDROME DURING PREGNANCY: ICD-10-CM

## 2020-05-21 DIAGNOSIS — F41.1 GENERALIZED ANXIETY DISORDER: ICD-10-CM

## 2020-05-21 PROBLEM — Z34.03 ENCOUNTER FOR SUPERVISION OF NORMAL FIRST PREGNANCY IN THIRD TRIMESTER: Status: RESOLVED | Noted: 2019-09-10 | Resolved: 2020-05-21

## 2020-05-21 PROCEDURE — G0463 HOSPITAL OUTPT CLINIC VISIT: HCPCS | Mod: ZF

## 2020-05-21 ASSESSMENT — PAIN SCALES - GENERAL: PAINLEVEL: NO PAIN (0)

## 2020-05-21 ASSESSMENT — MIFFLIN-ST. JEOR: SCORE: 1441.61
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
## 2020-05-22 ENCOUNTER — VIRTUAL VISIT (OUTPATIENT)
Dept: PHYSICAL THERAPY | Facility: CLINIC | Age: 35
End: 2020-05-22
Attending: ADVANCED PRACTICE MIDWIFE
Payer: COMMERCIAL

## 2020-05-22 DIAGNOSIS — N39.46 MIXED INCONTINENCE URGE AND STRESS (MALE)(FEMALE): ICD-10-CM

## 2020-05-22 DIAGNOSIS — M62.89 PELVIC FLOOR DYSFUNCTION IN FEMALE: ICD-10-CM

## 2020-05-22 PROCEDURE — 97161 PT EVAL LOW COMPLEX 20 MIN: CPT | Mod: GT | Performed by: PHYSICAL THERAPIST

## 2020-05-22 PROCEDURE — 97110 THERAPEUTIC EXERCISES: CPT | Mod: GT | Performed by: PHYSICAL THERAPIST

## 2020-05-22 NOTE — PROGRESS NOTES
"Physical Therapy Virtual Initial Visit      The patient has been notified of following:     \"This virtual visit will be conducted between you and your provider. We have found that certain health care needs can be provided without the need for physical presence.  This service lets us provide the care you need with a virtual visit.\"    Due to external, as well as internal [facility] management of the COVID-19 Virus, Deb Olivera was not seen in our clinic.  As a substitution, we implemented a virtual visit to manage this patient's condition utilizing the PTRx virtual visit platform via the patient s existing code.  The provider, Irene Montano, reviewed the patient's chart, PTRx prescription, and spoke with the patient to determine the following telemedicine visit is appropriate and effective for the patient's care.    The following type of visit was completed:   Video Visit:  The [employer] platform uses a synchronous HIPAA compliant video stream for this patient encounter.         Subjective:    Patient Health History  Deb Olivera being seen for Postpartum Pelvic floor issues.     Problem began: 4/3/2020.   Problem occurred: Child birth   Pain is reported as 2/10 on pain scale.  General health as reported by patient is good.  Pertinent medical history includes: none.   Red flags:  Incontinence.  Medical allergies: none.   Surgeries include:  None.    Current medications:  None.    Current occupation is .   Primary job tasks include:  Computer work and prolonged sitting.   Other job/home tasks details: housework,  (infant daughter).                Therapist Generated HPI Evaluation  Problem details: Pt notes having problems in her pelvic region since giving birth 7 weeks ago. She describes a lot of pressure sensations with walking and even with laying down, especially when laying on her back.  She does have mild incontinence, mainly with exercises but sometimes with a full bladder. She does not " "wear a pad or even a panty liner. She does note that after she is done peeing, it feels like she has more in her, but nothing comes out.  She is drinking plenty of water daily (3-4 of a 32 oz bottle) as she is still breastfeeding.  Pt is having regular BMs but is also taking a stool softener, and stools are well formed. She does not strain to pass stool.  She has not attempted sexual intercourse yet.  She does note that she has had LBP in the past and feels that her core is somewhat weak and would like some direction on how to help that..         Type of problem:  Incontinence and pelvic dysfunction.    This is a new condition.  Condition occurred with:  After pregnancy.  Where condition occurred: for unknown reasons.  Patient reports pain:  N/a (pressure only).      Since onset symptoms are gradually improving.  Symptoms are exacerbated by coughing and walking  and relieved by nothing.      Restrictions due to condition include:  Currently not working due to present treatment (still on maternity leave).  Barriers include:  None as reported by patient.                  Objective:    System                               Pelvic Dysfunction Evaluation:    Bladder/Pelvic Problems:    Storage Problem:  Frequency and mixed incontinence  Emptying Problem:  Incomplete emptying          Abdominal Wall:  Abdominal wall pelvic: pt reports a diastasis recti, provider measured it as 1 1/2 finger width gap.        Pelvic Clock Exam:  NA                External Assessment:  External assessment pelvic: pt palpated self, felt a \"bulge\" as if something coming out of vaginal opening with a cough, but no pain; she felt her perineum lift off chair with a pelvic floor contraction.              Internal Assessment:  NA              SEMG Biofeedback:  NA                  Additional History:  Delivery History:  Tearing and vaginal delivery  Number of Pregnancies: 1  Number of Live Births: 1                     General   ROS    PTRx Content " from today's visit:    Pelvic Floor Muscle Strengthening Quick Flicks - 10 reps in sitting; also did 5 reps of 10 s holds with 10 s relax btwn   Diastasis Recti Evaluation - discussed and to do indep'ly  Abdominal Strengthening For Diastasis Recti -discussed and demo'd how to lace fingers over gap in abdomen  Partial Sit-up, Notes: brace the over the gap with your fingers laced, and contract your pelvic floor muscles as you lift your head/shoulders  Bridging #1 - discussed how this can help reduce her prolapse if it feels worse than usual and to Contract  pelvic floor muscles in the lifted position  Side Stepping With Theraband - demo'd for pt to try for core strengthening/low impact exercise  Functional Lunge Forward -added after finishing our session, instructions to try it for core strengthening    Assessment/Plan:     Patient is a 34 year old female with pelvic complaints.    Patient has the following significant findings with corresponding treatment plan.                Diagnosis 1:  Post-partum PF dysfunction  Decreased strength - therapeutic exercise and therapeutic activities  Impaired muscle performance - neuro re-education  Decreased function - therapeutic activities    Therapy Evaluation Codes:   1) History comprised of:   Personal factors that impact the plan of care:      Past/current experiences.    Comorbidity factors that impact the plan of care are:      Migraines/headaches and anxiety.     Medications impacting care: senna.  2) Examination of Body Systems comprised of:   Body structures and functions that impact the plan of care:      Lumbar spine and Pelvis.   Activity limitations that impact the plan of care are:      Jumping, Standing, Walking, Laying down and Urinary incontinence.  3) Clinical presentation characteristics are:   Stable/Uncomplicated.  4) Decision-Making    Low complexity using standardized patient assessment instrument and/or measureable assessment of functional  outcome.  Cumulative Therapy Evaluation is: Low complexity.    Previous and current functional limitations:  (See Goal Flow Sheet for this information)    Short term and Long term goals: (See Goal Flow Sheet for this information)     Communication ability:  Patient appears to be able to clearly communicate and understand verbal and written communication and follow directions correctly.  Treatment Explanation - The following has been discussed with the patient:   RX ordered/plan of care  Anticipated outcomes  Possible risks and side effects  This patient would benefit from PT intervention to resume normal activities.   Rehab potential is excellent.    Frequency:  2 X a month, once daily  Duration:  for 3 months  Discharge Plan:  Achieve all LTG.  Independent in home treatment program.  Reach maximal therapeutic benefit.    Please refer to the daily flowsheet for treatment today, total treatment time and time spent performing 1:1 timed codes.       Virtual visit contact time    Time of service began: 11:20 AM  Time of service ended: 11:59 AM  Total Time for set up, visit, and documentation: 55 minutes    Payor: [employer] / Plan: [employer] OPEN ACCESS / Product Type: HMO /     Procedure Code/s   Therapeutic Exercise (01481): 15 minutes  Evaluation: 25 minutes    I have reviewed the note as documented above.  This accurately captures the substance of my conversation with the patient.  Provider location: [place], [place] ([facility]/State)  Patient location: home    ___________________________________________________         Answers for HPI/ROS submitted by the patient on 5/21/2020   History Reported by Patient  Where?: other  Pain quality: aching  Frequency: intermittent  Pain is: worse during the night  Progression since onset: unchanged  Work restrictions: working in normal job without restrictions  Barriers at home/work: none as reported by patient

## 2020-06-04 DIAGNOSIS — Z30.011 BCP (BIRTH CONTROL PILLS) INITIATION: Primary | ICD-10-CM

## 2020-06-04 RX ORDER — ACETAMINOPHEN AND CODEINE PHOSPHATE 120; 12 MG/5ML; MG/5ML
0.35 SOLUTION ORAL DAILY
Qty: 90 TABLET | Refills: 4 | Status: SHIPPED | OUTPATIENT
Start: 2020-06-04 | End: 2021-05-05

## 2020-06-05 ENCOUNTER — VIRTUAL VISIT (OUTPATIENT)
Dept: PHYSICAL THERAPY | Facility: CLINIC | Age: 35
End: 2020-06-05
Payer: COMMERCIAL

## 2020-06-05 DIAGNOSIS — M62.89 PELVIC FLOOR DYSFUNCTION IN FEMALE: ICD-10-CM

## 2020-06-05 DIAGNOSIS — N39.46 MIXED INCONTINENCE URGE AND STRESS (MALE)(FEMALE): ICD-10-CM

## 2020-06-05 PROCEDURE — 97110 THERAPEUTIC EXERCISES: CPT | Mod: GT | Performed by: PHYSICAL THERAPIST

## 2020-06-05 PROCEDURE — 97112 NEUROMUSCULAR REEDUCATION: CPT | Mod: GT | Performed by: PHYSICAL THERAPIST

## 2020-06-05 NOTE — PROGRESS NOTES
"Physical Therapy Virtual Follow Up Visit      The patient has been notified of following:     \"This virtual visit will be conducted between you and your provider. We have found that certain health care needs can be provided without the need for physical presence.  This service lets us provide the care you need with a virtual visit.\"    Due to external, as well as internal [facility] management of the COVID-19 Virus, Deb Olivera was not seen in our clinic.  As a substitution, we implemented a virtual visit to manage this patient's condition utilizing the PTRx virtual visit platform via the patient s existing code.  The provider, Irene Montano, reviewed the patient's chart, PTRx prescription, and spoke with the patient to determine the following telemedicine visit is appropriate and effective for the patient's care.    The following type of visit was completed:   Video Visit:  The PTRx platform uses a synchronous HIPAA compliant video stream for this patient encounter.        S: Deb Olivera is a 34 year old female. Connected virtually on the PTRx platform to discuss their condition/progress. They noted improvements in decreased pain with laying down/sleeping at night.  They noted ongoing pain or limitations with voiding, still has been feeling a lot of pressure whether she strains or not, as well as with walking.     Current pain level: 0/10  And no pressure sensation in pelvic area.    O: Patient demonstrated weakness in glut med's and glut max mm with ex's performed today; also appeared to have weakness on L side while performing squats.     PTRx Content from today's visit:    Pelvic Floor Muscle Strengthening Quick Flicks - EMR Notes: Rev'd, pt feels these have helped her a lot with the pressure sensation  Diastasis Recti Evaluation  Abdominal Strengthening For Diastasis Recti - EMR Notes: using fingers laced over DR, did 5x, then had pt do 5 more while lara PF mm at the same time  Supine " Abdominal Exercise #3 (Marching) - EMR Notes: 2 sets of 10x per leg while bracing with fingers  Bridging #1 - EMR Notes: did 5x to review  Side Stepping With Theraband - EMR Notes: Rev'd, pt feel it works her hips and thighs pretty well, sometimes uses band  Functional Lunge Forward - EMR Notes: pt notes that she doesn't feel her form is very good when she tried this, so has not done consistently  Clamshell Feet together - EMR Notes: 10x per side, felt fatigue  Glueteus James Re Training -  EMR Notes: 5 s holds x 10 per side  Squat Pelvic Floor - EMR Notes: 8x total today  Double Knee to Chest - EMR Notes: did one set following glut max ex   Marching in Place - EMR Notes: discussed trying to work on lara PF mm while marching in place to help with pressure sensation when walking    A:   Patient is progressing as expected.  Response to therapy has shown an improvement in  function and pressure sensation/pain  Response to therapy has shown lack of progress in  incontinence  Pt is progressing on STGs and LTGs.    P: Patient will continue with the exercise program assigned on their PTRx code and will add the following measures to manage their pain/condition: continue to work on PF ex's as well as all others prescribed; work on trying to incorporate PF contractions w/leg movements in general to help rebuild that muscle coordination.     Current treatment program is being advanced to more complex exercises.      Virtual visit contact time    Time of service began: 11:40 AM  Time of service ended: 12:10 PM  Total Time for set up, visit, and documentation: 45 minutes    Payor: InstallFree / Plan: InstallFree OPEN ACCESS / Product Type: HMO /   .  Procedure Code/s   Therapeutic Exercise (76728): 15 minutes  Neuromuscular Re-education (48329): 15 minutes    I have reviewed the note as documented above.  This accurately captures the substance of my conversation with the patient.  Provider location: [place], [place]  (City/State)  Patient location: home

## 2020-07-01 ENCOUNTER — THERAPY VISIT (OUTPATIENT)
Dept: PHYSICAL THERAPY | Facility: CLINIC | Age: 35
End: 2020-07-01
Payer: COMMERCIAL

## 2020-07-01 DIAGNOSIS — N39.46 MIXED INCONTINENCE URGE AND STRESS (MALE)(FEMALE): ICD-10-CM

## 2020-07-01 DIAGNOSIS — M62.89 PELVIC FLOOR DYSFUNCTION IN FEMALE: ICD-10-CM

## 2020-07-01 PROCEDURE — 97112 NEUROMUSCULAR REEDUCATION: CPT | Mod: GT | Performed by: PHYSICAL THERAPIST

## 2020-07-01 PROCEDURE — 97110 THERAPEUTIC EXERCISES: CPT | Mod: GT | Performed by: PHYSICAL THERAPIST

## 2020-07-01 NOTE — PROGRESS NOTES
"Physical Therapy Virtual Follow Up Visit      The patient has been notified of following:     \"This virtual visit will be conducted between you and your provider. We have found that certain health care needs can be provided without the need for physical presence.  This service lets us provide the care you need with a virtual visit.\"    Due to external, as well as internal [facility] management of the COVID-19 Virus, Deb Olivera was not seen in our clinic.  As a substitution, we implemented a virtual visit to manage this patient's condition utilizing the [employer] virtual visit platform via the patient s existing code.  The provider, Irene Montano, reviewed the patient's chart, PTRx prescription, and spoke with the patient to determine the following telemedicine visit is appropriate and effective for the patient's care.    The following type of visit was completed:   Video Visit:  The [employer] platform uses a synchronous HIPAA compliant video stream for this patient encounter.        S: Deb Olivera is a 34 year old female. Connected virtually on the [employer] platform to discuss their condition/progress. They noted improvements in incontinence and pressure/pain sensation with walking.  They noted ongoing pain or limitations with sitting on the toilet, pressure/pain sensation every time, even w/o elimination of urine or stool.     Current pain level: 0/10  Previously 0/10    O: Patient demonstrated DR measurement of 2 inches long, 1 12 fingers wide. Core strength improved as observed with ex's performed today.     PTRx Content from today's visit:  Exercise Name: Pelvic Floor Muscle Strengthening Quick Flicks - EMR Notes: incorporated into all the ex's performed today  Exercise Name: Diastasis Recti Evaluation, EMR Notes: pt measured; 2 inches top to bottom, 1 1/2 fingers side to side  Exercise Name: Abdominal Strengthening For Diastasis Recti -  EMR Notes: pt using belly band at home  Exercise Name: Partial Sit-up,  EMR " Notes: rev'd to progress for DR strengthening, and when crunched for time, to do this for sure  Exercise Name: Supine Abdominal Exercise #3 (Marching) -EMR Notes: HEP  Exercise Name: Bridging #1 - EMR Notes: HEP  Exercise Name: Side Stepping With Theraband -EMR Notes: Rev'd, medium resistance band used when doing this  Exercise Name: Functional Lunge Forward -  EMR Notes: had pt do the modified version vs regular, and she noted that this felt much better  x10 per side, brief holds  Exercise Name: Clamshell Feet together - EMR Notes: 15x per side, cues to contract TrA and PFM as she lifts knee  Exercise Name: Glueteus James Re Training -  EMR Notes: 5 s holds x 10 per side  Exercise Name: Squat Pelvic Floor - EMR Notes: 10x total today w/3 sec hold in squat position  Exercise Name: Double Knee to Chest - HEP  Exercise Name: Marching in Place - HEP    A:   Patient's symptoms are resolving.  Patient is progressing as expected.  Response to therapy has shown an improvement in  pain level, strength, muscle control and incontinence  Response to therapy has shown lack of progress in  strength, muscle control, coordination and pressure sensation  Pt is meeting STGs and progressing toward LTGs    P: Patient will continue with the exercise program assigned on their PTRx code and will add the following measures to manage their pain/condition: on days when she is unable to do all her ex's, she should focus on doing the DR strengthening and clamshells.     Current treatment program is being advanced to more complex exercises.  Frequency and/or duration have been changed to:  Yes,  1 X  Month for 2 months      Virtual visit contact time    Time of service began: 12:05 PM  Time of service ended: 12:37 PM  Total Time for set up, visit, and documentation: 40 minutes    Payor: [employer] / Plan: [employer] OPEN ACCESS / Product Type: HMO /   .  Procedure Code/s   Therapeutic Exercise (83734): 10 minutes  Neuromuscular  Re-education (05831): 20 minutes    I have reviewed the note as documented above.  This accurately captures the substance of my conversation with the patient.  Provider location: [place], [place] ([facility])  Patient location: home

## 2020-10-01 PROBLEM — M62.89 PELVIC FLOOR DYSFUNCTION IN FEMALE: Status: RESOLVED | Noted: 2020-05-22 | Resolved: 2020-10-01

## 2020-10-01 PROBLEM — N39.46 MIXED INCONTINENCE URGE AND STRESS (MALE)(FEMALE): Status: RESOLVED | Noted: 2020-05-22 | Resolved: 2020-10-01

## 2020-10-01 NOTE — PROGRESS NOTES
DISCHARGE SUMMARY    Deb Olivera was seen 3 times for evaluation and treatment.  Patient did not return for further treatment and current status is unknown.  Due to short treatment duration, no objective or functional changes were made.  Please see goal flow sheet from episode noted date below and initial evaluation for further information.  Patient is discharged from therapy and therapy episode is resolved as of 10/01/20.      Linked Episodes   Type: Episode: Status: Noted: Resolved: Last update: Updated by:   PHYSICAL THERAPY post-partum PF dysfunction  5-22-20 Active 5/22/2020 7/1/2020 12:52 PM Irene Montano, PT      Comments:

## 2020-10-28 ENCOUNTER — OFFICE VISIT (OUTPATIENT)
Dept: FAMILY MEDICINE | Facility: CLINIC | Age: 35
End: 2020-10-28
Payer: COMMERCIAL

## 2020-10-28 VITALS
WEIGHT: 147 LBS | OXYGEN SATURATION: 98 % | SYSTOLIC BLOOD PRESSURE: 113 MMHG | HEART RATE: 107 BPM | TEMPERATURE: 98.2 F | BODY MASS INDEX: 23.07 KG/M2 | DIASTOLIC BLOOD PRESSURE: 70 MMHG | HEIGHT: 67 IN

## 2020-10-28 DIAGNOSIS — R10.32 BILATERAL LOWER ABDOMINAL DISCOMFORT: ICD-10-CM

## 2020-10-28 DIAGNOSIS — N63.0 LUMP OR MASS IN BREAST: Primary | ICD-10-CM

## 2020-10-28 DIAGNOSIS — E55.9 VITAMIN D DEFICIENCY: ICD-10-CM

## 2020-10-28 DIAGNOSIS — R53.83 OTHER FATIGUE: ICD-10-CM

## 2020-10-28 DIAGNOSIS — R10.31 BILATERAL LOWER ABDOMINAL DISCOMFORT: ICD-10-CM

## 2020-10-28 LAB
ALBUMIN UR-MCNC: NEGATIVE MG/DL
APPEARANCE UR: CLEAR
BILIRUB UR QL STRIP: NEGATIVE
COLOR UR AUTO: YELLOW
ERYTHROCYTE [DISTWIDTH] IN BLOOD BY AUTOMATED COUNT: 11.7 % (ref 10–15)
GLUCOSE UR STRIP-MCNC: NEGATIVE MG/DL
HCT VFR BLD AUTO: 38.9 % (ref 35–47)
HGB BLD-MCNC: 12.7 G/DL (ref 11.7–15.7)
HGB UR QL STRIP: NEGATIVE
KETONES UR STRIP-MCNC: NEGATIVE MG/DL
LEUKOCYTE ESTERASE UR QL STRIP: NEGATIVE
MCH RBC QN AUTO: 31.1 PG (ref 26.5–33)
MCHC RBC AUTO-ENTMCNC: 32.6 G/DL (ref 31.5–36.5)
MCV RBC AUTO: 95 FL (ref 78–100)
NITRATE UR QL: NEGATIVE
PH UR STRIP: 6 PH (ref 5–7)
PLATELET # BLD AUTO: 300 10E9/L (ref 150–450)
RBC # BLD AUTO: 4.08 10E12/L (ref 3.8–5.2)
SOURCE: NORMAL
SP GR UR STRIP: <=1.005 (ref 1–1.03)
TSH SERPL DL<=0.005 MIU/L-ACNC: 0.91 MU/L (ref 0.4–4)
UROBILINOGEN UR STRIP-ACNC: 0.2 EU/DL (ref 0.2–1)
WBC # BLD AUTO: 7.3 10E9/L (ref 4–11)

## 2020-10-28 PROCEDURE — 84443 ASSAY THYROID STIM HORMONE: CPT | Performed by: FAMILY MEDICINE

## 2020-10-28 PROCEDURE — 82306 VITAMIN D 25 HYDROXY: CPT | Performed by: FAMILY MEDICINE

## 2020-10-28 PROCEDURE — 81003 URINALYSIS AUTO W/O SCOPE: CPT | Performed by: FAMILY MEDICINE

## 2020-10-28 PROCEDURE — 36415 COLL VENOUS BLD VENIPUNCTURE: CPT | Performed by: FAMILY MEDICINE

## 2020-10-28 PROCEDURE — 99214 OFFICE O/P EST MOD 30 MIN: CPT | Performed by: FAMILY MEDICINE

## 2020-10-28 PROCEDURE — 85027 COMPLETE CBC AUTOMATED: CPT | Performed by: FAMILY MEDICINE

## 2020-10-28 ASSESSMENT — MIFFLIN-ST. JEOR: SCORE: 1404.42

## 2020-10-28 NOTE — PROGRESS NOTES
"Subjective     Deb Olivera is a 34 year old female who presents to clinic today for the following health issues:    HPI            Lump or mass in breast  -area in the right breast superior to and subareolar that she feels as a lump.  She is nursing regularly and does not note that this area changes before or after nursing.  No history of mastitis or other nursing difficulty described.  Will occasionally get a discomfort in the right armpit area as well.  Not currently having any symptoms.    Bilateral lower abdominal discomfort  -   did have pelvic and back pain that was discussed at her postpartum check and she underwent some physical therapy which has been very effective for those symptoms.  Continues to do those exercises at home.  She is currently noting discomfort in the anterior lower abdominal area.  She will noticed this with running for example.  Some upper mid back pain is noted on occasion as well.  Occasional symptoms of fatigue noted.  Her daughter is sleeping well.      Vitamin D deficiency -was found to have significant vitamin D deficiency about 9 months ago.  Has been on supplements since that time.    PHQ 9/10/2019 1/20/2020 10/30/2020   PHQ-9 Total Score 3 0 8   Q9: Thoughts of better off dead/self-harm past 2 weeks Not at all Not at all Not at all     BRADLEY-7 SCORE 4/16/2020 5/11/2020 10/30/2020   Total Score - - -   Total Score 5 (mild anxiety) 5 (mild anxiety) -   Total Score 5 5 4         Review of Systems   Constitutional, HEENT, cardiovascular, pulmonary, gi and gu systems are negative, except as otherwise noted.      Objective    /70 (BP Location: Right arm, Patient Position: Chair, Cuff Size: Adult Regular)   Pulse 107   Temp 98.2  F (36.8  C) (Oral)   Ht 1.702 m (5' 7\")   Wt 66.7 kg (147 lb)   SpO2 98%   BMI 23.02 kg/m    Body mass index is 23.02 kg/m .  Physical Exam   GENERAL: healthy, alert and no distress  NECK: no adenopathy, no asymmetry, masses, or scars and thyroid " normal to palpation  RESP: lungs clear to auscultation - no rales, rhonchi or wheezes  BREAST: left breast normal without masses, tenderness or nipple discharge, no palpable axillary masses or adenopathy and right breast with lump/mass superior/subareolar area of the breast - 2 cm, mildly tender.  No right axillary adenopathy.  CV: regular rate and rhythm, normal S1 S2, no S3 or S4, no murmur, click or rub, no peripheral edema and peripheral pulses strong  ABDOMEN: tenderness mild midline lower abdominal, no rebound or guarding, no organomegaly or masses and bowel sounds normal.  No significant diathesis noted.  MS: no gross musculoskeletal defects noted, no edema  SKIN: no suspicious lesions or rashes  BACK: no CVA tenderness, no paralumbar tenderness.  Mild tenderness midthoracic area paravertebral muscles.  PSYCH: mentation appears normal, affect normal/bright    Results for orders placed or performed in visit on 10/28/20   *UA reflex to Microscopic and Culture ([place] and [facility] (except [place] and [place])     Status: None    Specimen: Midstream Urine   Result Value Ref Range    Color Urine Yellow     Appearance Urine Clear     Glucose Urine Negative NEG^Negative mg/dL    Bilirubin Urine Negative NEG^Negative    Ketones Urine Negative NEG^Negative mg/dL    Specific Gravity Urine <=1.005 1.003 - 1.035    Blood Urine Negative NEG^Negative    pH Urine 6.0 5.0 - 7.0 pH    Protein Albumin Urine Negative NEG^Negative mg/dL    Urobilinogen Urine 0.2 0.2 - 1.0 EU/dL    Nitrite Urine Negative NEG^Negative    Leukocyte Esterase Urine Negative NEG^Negative    Source Midstream Urine    CBC with platelets     Status: None   Result Value Ref Range    WBC 7.3 4.0 - 11.0 10e9/L    RBC Count 4.08 3.8 - 5.2 10e12/L    Hemoglobin 12.7 11.7 - 15.7 g/dL    Hematocrit 38.9 35.0 - 47.0 %    MCV 95 78 - 100 fl    MCH 31.1 26.5 - 33.0 pg    MCHC 32.6 31.5 - 36.5 g/dL    RDW 11.7 10.0 - 15.0 %    Platelet Count 300 150 -  450 10e9/L   TSH with free T4 reflex     Status: None   Result Value Ref Range    TSH 0.91 0.40 - 4.00 mU/L   Vitamin D Deficiency     Status: Abnormal   Result Value Ref Range    Vitamin D Deficiency screening 78 (H) 20 - 75 ug/L           Assessment & Plan   Problem List Items Addressed This Visit     None      Visit Diagnoses     Lump or mass in breast    -  Primary    Relevant Orders    US Breast Right Limited 1-3 Quadrants    Bilateral lower abdominal discomfort        Relevant Orders    *UA reflex to Microscopic and Culture ([place] [facility] (except [place] and [place]) (Completed)    CBC with platelets (Completed)    Other fatigue        Relevant Orders    CBC with platelets (Completed)    TSH with free T4 reflex (Completed)    Vitamin D deficiency        Relevant Orders    Vitamin D Deficiency (Completed)         Normal lab testing.  Breast evaluation as planned.  Consider additional evaluation of the lower abdominal symptoms if persistent and not resolved with increase to normal activities.          Patient Instructions   You can begin exercises for the core abdominal muscles without weights as tolerated.    Ultrasound for the area in breast ordered.  You can call to schedule if the area increases in size or fails to resolve in the next 2 weeks.   You can call [phone] to schedule this at the [facility] building in [facility] today.    Return in about 4 weeks (around 11/25/2020) for as needed for persistent symptoms.    Shruthi Cruz MD  [facility]         This chart was documented by provider using a voice activated software called Dragon in addition to manual typing. There may be vocabulary errors or other grammatical errors due to this.

## 2020-10-29 LAB — DEPRECATED CALCIDIOL+CALCIFEROL SERPL-MC: 78 UG/L (ref 20–75)

## 2020-10-29 NOTE — RESULT ENCOUNTER NOTE
These initial tests are all normal.  Your urine testing is normal.  Your thyroid testing is normal.  Your blood cell counts are normal.  Your vitamin D level is pending and will be forwarded when that is available  Please call or MyChart message me if you have any questions.    PARKERK

## 2020-10-30 ASSESSMENT — ANXIETY QUESTIONNAIRES
6. BECOMING EASILY ANNOYED OR IRRITABLE: NOT AT ALL
IF YOU CHECKED OFF ANY PROBLEMS ON THIS QUESTIONNAIRE, HOW DIFFICULT HAVE THESE PROBLEMS MADE IT FOR YOU TO DO YOUR WORK, TAKE CARE OF THINGS AT HOME, OR GET ALONG WITH OTHER PEOPLE: SOMEWHAT DIFFICULT
5. BEING SO RESTLESS THAT IT IS HARD TO SIT STILL: NOT AT ALL
7. FEELING AFRAID AS IF SOMETHING AWFUL MIGHT HAPPEN: SEVERAL DAYS
1. FEELING NERVOUS, ANXIOUS, OR ON EDGE: SEVERAL DAYS
3. WORRYING TOO MUCH ABOUT DIFFERENT THINGS: SEVERAL DAYS
GAD7 TOTAL SCORE: 4
2. NOT BEING ABLE TO STOP OR CONTROL WORRYING: SEVERAL DAYS

## 2020-10-30 ASSESSMENT — PATIENT HEALTH QUESTIONNAIRE - PHQ9
SUM OF ALL RESPONSES TO PHQ QUESTIONS 1-9: 8
5. POOR APPETITE OR OVEREATING: NOT AT ALL

## 2020-10-31 ASSESSMENT — ANXIETY QUESTIONNAIRES: GAD7 TOTAL SCORE: 4

## 2020-11-03 ENCOUNTER — HOSPITAL ENCOUNTER (OUTPATIENT)
Dept: MAMMOGRAPHY | Facility: CLINIC | Age: 35
End: 2020-11-03
Attending: FAMILY MEDICINE
Payer: COMMERCIAL

## 2020-11-03 DIAGNOSIS — N63.0 LUMP OR MASS IN BREAST: ICD-10-CM

## 2020-11-03 DIAGNOSIS — Z11.59 ENCOUNTER FOR SCREENING FOR OTHER VIRAL DISEASES: Primary | ICD-10-CM

## 2020-11-03 PROCEDURE — G0279 TOMOSYNTHESIS, MAMMO: HCPCS

## 2020-11-03 PROCEDURE — 76642 ULTRASOUND BREAST LIMITED: CPT | Mod: RT

## 2020-11-08 DIAGNOSIS — Z11.59 ENCOUNTER FOR SCREENING FOR OTHER VIRAL DISEASES: ICD-10-CM

## 2020-11-08 PROCEDURE — U0003 INFECTIOUS AGENT DETECTION BY NUCLEIC ACID (DNA OR RNA); SEVERE ACUTE RESPIRATORY SYNDROME CORONAVIRUS 2 (SARS-COV-2) (CORONAVIRUS DISEASE [COVID-19]), AMPLIFIED PROBE TECHNIQUE, MAKING USE OF HIGH THROUGHPUT TECHNOLOGIES AS DESCRIBED BY CMS-2020-01-R: HCPCS | Performed by: FAMILY MEDICINE

## 2020-11-09 LAB
SARS-COV-2 RNA SPEC QL NAA+PROBE: NOT DETECTED
SPECIMEN SOURCE: NORMAL

## 2020-11-11 ENCOUNTER — HOSPITAL ENCOUNTER (OUTPATIENT)
Dept: MAMMOGRAPHY | Facility: CLINIC | Age: 35
End: 2020-11-11
Attending: FAMILY MEDICINE
Payer: COMMERCIAL

## 2020-11-11 DIAGNOSIS — N63.0 LUMP OR MASS IN BREAST: ICD-10-CM

## 2020-11-11 PROCEDURE — 250N000009 HC RX 250: Performed by: FAMILY MEDICINE

## 2020-11-11 PROCEDURE — 999N000065 MA POST PROCEDURE RIGHT

## 2020-11-11 PROCEDURE — 88305 TISSUE EXAM BY PATHOLOGIST: CPT | Mod: 26 | Performed by: PATHOLOGY

## 2020-11-11 PROCEDURE — 88305 TISSUE EXAM BY PATHOLOGIST: CPT | Mod: TC | Performed by: FAMILY MEDICINE

## 2020-11-11 PROCEDURE — 88342 IMHCHEM/IMCYTCHM 1ST ANTB: CPT | Mod: TC | Performed by: FAMILY MEDICINE

## 2020-11-11 PROCEDURE — 88342 IMHCHEM/IMCYTCHM 1ST ANTB: CPT | Mod: 26 | Performed by: PATHOLOGY

## 2020-11-11 PROCEDURE — 272N000031 US BREAST BIOPSY CORE NEEDLE RIGHT

## 2020-11-11 RX ADMIN — LIDOCAINE HYDROCHLORIDE 5 ML: 10 INJECTION, SOLUTION INFILTRATION; PERINEURAL at 10:12

## 2020-11-11 NOTE — DISCHARGE INSTRUCTIONS

## 2020-11-13 ENCOUNTER — TELEPHONE (OUTPATIENT)
Dept: MAMMOGRAPHY | Facility: CLINIC | Age: 35
End: 2020-11-13

## 2020-11-13 LAB — COPATH REPORT: NORMAL

## 2020-11-13 NOTE — RESULT ENCOUNTER NOTE
Bree Cruz is out of the office and I am reviewing your results.   It looks like your biopsy results did not show any cancers.   Please call or MyChart my office with any questions or concerns.   Jammie Ascencio, PAC

## 2020-12-06 ENCOUNTER — HEALTH MAINTENANCE LETTER (OUTPATIENT)
Age: 35
End: 2020-12-06

## 2021-01-07 ENCOUNTER — THERAPY VISIT (OUTPATIENT)
Dept: PHYSICAL THERAPY | Facility: CLINIC | Age: 36
End: 2021-01-07
Payer: COMMERCIAL

## 2021-01-07 DIAGNOSIS — R10.2 PELVIC PAIN IN FEMALE: ICD-10-CM

## 2021-01-07 DIAGNOSIS — M62.89 PELVIC FLOOR DYSFUNCTION: ICD-10-CM

## 2021-01-07 PROCEDURE — 97110 THERAPEUTIC EXERCISES: CPT | Mod: GP | Performed by: PHYSICAL THERAPIST

## 2021-01-07 PROCEDURE — 97161 PT EVAL LOW COMPLEX 20 MIN: CPT | Mod: GP | Performed by: PHYSICAL THERAPIST

## 2021-01-07 PROCEDURE — 97530 THERAPEUTIC ACTIVITIES: CPT | Mod: GP | Performed by: PHYSICAL THERAPIST

## 2021-01-07 NOTE — PROGRESS NOTES
[facility] Initial Evaluation  Subjective:  The history is provided by the patient.   Patient Health History  Deb Olivera being seen for Pelvic pain.     Problem began: 4/3/2020.   Problem occurred: Post-partum   Pain is reported as 3/10 on pain scale.  General health as reported by patient is good.  Pertinent medical history includes: none.   Red flags:  None as reported by patient.  Medical allergies: none.   Surgeries include:  None.    Current medications:  None.    Current occupation is .   Primary job tasks include:  Computer work and prolonged sitting.                  Therapist Generated HPI Evaluation  Problem details: Deb Olivera is a 35 year old year old female with a pelvic floor condition. Patient reports onset of symptoms 4/3/20.Symptoms include lower abdominal pain and pressure, incontinence w/ex's. Since onset symptoms have been getting better, worse or staying the same? Staying the same   Urination:  Do you leak on the way to the bathroom or with a strong urge to void? No   Do you leak with cough,sneeze, jumping, running?Yes, mostly running, sometimes with laughing   Any other activities that cause leaking? No   Do you have triggers that make you feel you can't wait to go to the bathroom? No what are they NA.  Type of pad and number used per day? None used, even when she goes running  When you leak what is the amount? Hasn't actually leaked urine, just feels like she is about to  How long can you delay the need to urinate? Every 2 hours or so during the day, maybe more frequently when at home vs at the office.   How many times do you get up to urinate at night? 1-2   Can you stop the flow of urine when on the toilet? Yes  Is the volume of urine passed usually: average. (8sec rule= 250ml with average bladder storing 400-600ml)  Do you strain to pass urine? No  Do you have a slow or hesitant urinary stream? No  Do you have difficulty initiating the urine stream? No  Is  urination painful? No  How many bladder infections have you had in last 12 months? none  Fluid intake(one glass is 8oz or one cup) 32-60 oz/day, 2 cups of coffee/day  Very occasional alcohol   Bowel habits:  Frequency of bowel movements? 2-4 times a week  Consistancy of stool? soft formed, [place] Stool Scale type 4  Do you ignore the urge to defecate? No  Do you strain to pass stool? No  Pelvic Pain:  Do you have any pelvic pain with intercourse, exams, use of tampons? No  Pain level rate at  NA  Are you sexually active?Yes  Is initial penetration during intercourse painful? No  Is deeper penetration painful? No  Do you use lubricant? No What kind? NA     Given birth? Yes Any complications? No  # of vaginal delieveries?1  # of C-sections? no  # of episiotomies? none.  Have you ever been worried for your physical safety? No   Any abdominal or pelvic surgeries? no  Are you having any regular exercise? yes  Have you practiced the PF(kegel) exercises for 4 or more weeks? yes  Thyroid checked? yes(related to hair loss, flu-like symptoms, wt gain/loss, fatigue, menopause)  Changed diet lately? no.         Type of problem:  Pelvic dysfunction.                         Restrictions due to condition include:  Working in normal job without restrictions.  Barriers include:  None as reported by patient.                        Objective:  System                                 Pelvic Dysfunction Evaluation:    Bladder/Pelvic Problems:    Storage Problem:  Stress incontinence and nocturia            Abdominal Wall:  Abdominal wall pelvic: DR measures 2 inches long, 1/2 inch wide at widest across navel; mild tenderness on B inguinal ligaments, nontender on lower abdominals.  Diastasis Recti:  Present      Pelvic Clock Exam:    Ischiocavernosis pain:  -  Bulbocavernosis pain:  -  Transverse Perineal:  -    Perineal Body:  -    Reflex Testing:  normal    External Assessment:    Skin Condition:  Normal  Scars:  Well healed  Bearing  Down/Coughing:  Urethrocele  Tissue Symmetry:  Normal  Introitus:  Normal  Muscle Contraction/Perineal Mobility:  Elevation and urogential triangle descent  Internal Assessment:    Sensory Exam:  Normal  Contraction/Grade:  Good squeeze, good hold with lift, repeatable (4)  Accessory Muscle use-Abdominals:  None  Accessory Muscle use-Gluteals:  None  Accessory Muscle use-Adductors:  None  Symmetry of Contraction Response:  Equal  SEMG Biofeedback:    Equipment:  MR-10 unit    Suraface electrode placement--Perianal:  Yes, bilateral placement  Baseline EMG PM:  0.3 uV (very low)    Peak pelvic muscle contraction:  13 uV  Sustained contraction:  Poor to fair control init'ly, improved as she did more, but only avg'ing 5.5 uV  EMG interpretation to fatigue:  5-8 seconds  Position:  SittingAdditional History:  Delivery History:  Tearing  Number of Pregnancies: 1  Number of Live Births: 1                       General     ROS    Assessment/Plan:    Patient is a 35 year old female with pelvic complaints.    Patient has the following significant findings with corresponding treatment plan.                Diagnosis 1:  PF dysfunction w/prolapse  Pain -  hot/cold therapy, self management, education and home program  Decreased ROM/flexibility - manual therapy and therapeutic exercise  Decreased strength - therapeutic exercise and therapeutic activities  Impaired muscle performance - biofeedback and neuro re-education  Decreased function - therapeutic activities    Therapy Evaluation Codes:   1) History comprised of:   Personal factors that impact the plan of care:      Past/current experiences and Time since onset of symptoms.    Comorbidity factors that impact the plan of care are:      None.     Medications impacting care: None.  2) Examination of Body Systems comprised of:   Body structures and functions that impact the plan of care:      Pelvis.   Activity limitations that impact the plan of care are:      Running and  prolapse.  3) Clinical presentation characteristics are:   Stable/Uncomplicated.  4) Decision-Making    Low complexity using standardized patient assessment instrument and/or measureable assessment of functional outcome.  Cumulative Therapy Evaluation is: Low complexity.    Previous and current functional limitations:  (See Goal Flow Sheet for this information)    Short term and Long term goals: (See Goal Flow Sheet for this information)     Communication ability:  Patient appears to be able to clearly communicate and understand verbal and written communication and follow directions correctly.  Treatment Explanation - The following has been discussed with the patient:   RX ordered/plan of care  Anticipated outcomes  Possible risks and side effects  This patient would benefit from PT intervention to resume normal activities.   Rehab potential is excellent.    Frequency:  1 X week, once daily  Duration:  for 3 weeks tapering to 2 X a month over 2 months  Discharge Plan:  Achieve all LTG.  Independent in home treatment program.  Reach maximal therapeutic benefit.    Please refer to the daily flowsheet for treatment today, total treatment time and time spent performing 1:1 timed codes.

## 2021-01-29 ENCOUNTER — THERAPY VISIT (OUTPATIENT)
Dept: PHYSICAL THERAPY | Facility: CLINIC | Age: 36
End: 2021-01-29
Payer: COMMERCIAL

## 2021-01-29 DIAGNOSIS — M62.89 PELVIC FLOOR DYSFUNCTION: ICD-10-CM

## 2021-01-29 DIAGNOSIS — R10.2 PELVIC PAIN IN FEMALE: ICD-10-CM

## 2021-01-29 PROCEDURE — 97110 THERAPEUTIC EXERCISES: CPT | Mod: GP | Performed by: PHYSICAL THERAPIST

## 2021-01-29 PROCEDURE — 97140 MANUAL THERAPY 1/> REGIONS: CPT | Mod: GP | Performed by: PHYSICAL THERAPIST

## 2021-03-16 PROBLEM — M62.89 PELVIC FLOOR DYSFUNCTION: Status: RESOLVED | Noted: 2021-01-07 | Resolved: 2021-03-16

## 2021-03-16 PROBLEM — R10.2 PELVIC PAIN IN FEMALE: Status: RESOLVED | Noted: 2021-01-07 | Resolved: 2021-03-16

## 2021-03-16 NOTE — PROGRESS NOTES
DISCHARGE SUMMARY    Deb Olivera was seen 2 times for evaluation and treatment.  Patient did not return for further treatment and current status is unknown.  Due to short treatment duration, no objective or functional changes were made.  Please see goal flow sheet from episode noted date below and initial evaluation for further information.  Patient is discharged from therapy and therapy episode is resolved as of 03/16/21.      Linked Episodes   Type: Episode: Status: Noted: Resolved: Last update: Updated by:   PHYSICAL THERAPY PF dysfunction w/prolapse 1-7-20 Active 1/7/2021 1/29/2021 11:08 AM Irene Montano, PT      Comments:

## 2021-04-21 ASSESSMENT — ANXIETY QUESTIONNAIRES
GAD7 TOTAL SCORE: 5
6. BECOMING EASILY ANNOYED OR IRRITABLE: SEVERAL DAYS
7. FEELING AFRAID AS IF SOMETHING AWFUL MIGHT HAPPEN: SEVERAL DAYS
GAD7 TOTAL SCORE: 5
2. NOT BEING ABLE TO STOP OR CONTROL WORRYING: NOT AT ALL
5. BEING SO RESTLESS THAT IT IS HARD TO SIT STILL: NOT AT ALL
7. FEELING AFRAID AS IF SOMETHING AWFUL MIGHT HAPPEN: SEVERAL DAYS
4. TROUBLE RELAXING: SEVERAL DAYS
3. WORRYING TOO MUCH ABOUT DIFFERENT THINGS: SEVERAL DAYS
1. FEELING NERVOUS, ANXIOUS, OR ON EDGE: SEVERAL DAYS

## 2021-04-21 ASSESSMENT — ENCOUNTER SYMPTOMS
HOT FLASHES: 0
DECREASED CONCENTRATION: 1
DEPRESSION: 1
INSOMNIA: 1
PANIC: 0
DECREASED LIBIDO: 1
NERVOUS/ANXIOUS: 1

## 2021-04-22 ASSESSMENT — ANXIETY QUESTIONNAIRES: GAD7 TOTAL SCORE: 5

## 2021-05-02 NOTE — PROGRESS NOTES
[facility]  Gynecology Visit    SUBJECTIVE    Deb Olivera is a 35 year old  who is here for an annual examination with concerns of pelvic pain. Pain began following birth of son 1 year ago. Has been working with pelvic floor PT who were concerned about possible prolapse. Pain started diffuse across anterior pelvis, slowly localized to R groin/lower abdomen. Intermittent. No incontinence or leaking or urine. No burning or neuropathic pain. No changes to sensation or numbness. Not provoked by menses or sexual intercourse. Somewhat provoked by activity but also felt at rest and at night. Has not tried OTC medications. Minor relief from hot packs.    Interested in trying for pregnancy again. Still breastfeeding but is currently weaning off and expects to be done within several weeks. Menses have resumed. Has been using condoms for contraception. Is on a prenatal vitamin.     PAST MEDICAL HISTORY  Past Medical History:   Diagnosis Date     Hx of abnormal pap 2014    LSIL, colpo CIN1       MEDICATIONS  Current Outpatient Medications   Medication     acetaminophen (TYLENOL) 325 MG tablet     cholecalciferol (VITAMIN D3) 400 unit (10 mcg) TABS tablet     norethindrone (MICRONOR) 0.35 MG tablet     Prenatal Vit-Fe Fumarate-FA (PRENATAL COMPLETE PO)     No current facility-administered medications for this visit.      Facility-Administered Medications Ordered in Other Visits   Medication     bupivacaine (MARCAINE) 0.125 % injection (diluted from stock concentration by MD or CRNA)     fentaNYL (PF) (SUBLIMAZE) injection     fentaNYL (SUBLIMAZE) 2 mcg/mL, bupivacaine (MARCAINE) 0.125% in NaCl 0.9% EPIDURAL infusion     lidocaine-EPINEPHrine 1.5 %-1:437008 injection       ALLERGIES  No Known Allergies    OBSTETRIC/GYNECOLOGIC HISTORY  Her LMP is: 21  Period cycle/length/flow/associated symptoms: q29 days  Current contraception: condoms  Number of partners in last year: 1  History of STDs:  none  Component      Latest Ref Rng & Units 4/10/2018   HPV Source       SurePath   HPV 16 DNA      NEG:Negative Negative   HPV 18 DNA      NEG:Negative Negative   Other HR HPV      NEG:Negative Negative   Final Diagnosis       This patient's sample is negative for HPV DNA.   Specimen Description       Cervical Cells   PAP       NIL   Copath Report       Patient Name: NEGIN ALVARADO . Ratna .     History of abnormal Pap smear: Yes: LSIL in , all normal since    OB History    Para Term  AB Living   1 1 1 0 0 1   SAB TAB Ectopic Multiple Live Births   0 0 0 0 1      # Outcome Date GA Lbr Jake/2nd Weight Sex Delivery Anes PTL Lv   1 Term 20 38w5d 07:30 / 01:32 3.374 kg (7 lb 7 oz) F Vag-Spont EPI N ALEXIS      Name: SANDRA JOHNSON-NEGIN      Apgar1: 9  Apgar5: 9       PAST SURGICAL HISTORY   Past Surgical History:   Procedure Laterality Date     ESOPHAGOSCOPY, GASTROSCOPY, DUODENOSCOPY (EGD), COMBINED N/A 2019    Procedure: COMBINED ESOPHAGOSCOPY, GASTROSCOPY, DUODENOSCOPY (EGD);  Surgeon: Vinay Childers MD;  Location:  GI       SOCIAL HISTORY  Social History     Tobacco Use     Smoking status: Former Smoker     Packs/day: 0.00     Years: 2.00     Pack years: 0.00     Types: Cigarettes     Smokeless tobacco: Never Used     Tobacco comment: when 19   Substance Use Topics     Alcohol use: Not Currently     Comment: occ     Drug use: No     Social History     Social History Narrative     Not on file       FAMILY HISTORY  Family History   Problem Relation Age of Onset     Diabetes Paternal Grandfather      Other Cancer Paternal Grandfather      Lipids Father      Other Cancer Father         testicular     Hypertension Father      Other Cancer Maternal Grandmother         brain cancer     Lung Cancer Paternal Grandmother        REVIEW OF SYSTEMS  A 10 point review of systems including Constitutional, Eyes, Respiratory, Cardiovascular, Gastroenterology, Genitourinary, Integumentary, Musculoskeletal,  "and Psychiatric, were all negative, except for pertinent positives noted in the above HPI.    OBJECTIVE  Ht 1.702 m (5' 7\")   Wt 72.7 kg (160 lb 4.8 oz)   LMP 2021   BMI 25.11 kg/m      General: Alert, without distress  HEENT: normocephalic, without obvious abnormality; normal thyroid gland   Breast: Within normal limits bilaterally, no nipple discharge, no lymphadenopathy  Cardiovascular: regular rate and rhythm, no murmurs/rubs/gallops   Lungs: clear to auscultation bilaterally   Abdomen: soft, non-tender, non-distended, normal bowel sounds; small diastasis < 2cm at umbilicus   Pelvic: normal external female genitalia; normal vagina without discharge; normal cervix without lesions/masses; no evidence of prolapse of cervix, anterior or posterior vagina either at rest or with valsalva; uterus small, anteverted, mobile, nontender; adnexae nontender and without masses; normal anus/perineum   Extremities: normal    LABS:   Breast Biopsy 2020:  Breast, right, 11:00, retroareolar: Ultrasound guided core biopsy:   - Benign breast tissue with stromal fibrosis, negative for in situ and   invasive carcinoma     ASSESSMENT  Deb Olivera is a 35 year old  who is here for annual visit with pelvic pain and pre-conception counseling.  Normal breast and pelvic exam. Pelvic pain w/ unclear etiology. No cystocele or prolapse on exam. No hernia. Will obtain TVUS to r/o uterine fibroids, ovarian cyst, or other pelvic pathology.      PLAN  -pelvic pain:    -General PT referral as Deb is wondering if she has anterior abdominal wall tenderness from diastasis   -TVUS  -preconception counseling:    -Cont prenatal vitamin   -Pre-conception counseling provided. Encouraged 18 month birth to birth window so okay to start trying. Also discussed fertility windows, most fertile day 13 of 29 day cycle.     Age 19-39 Annual Preventive Exam  1.  Screening:   Cervical cancer: last pap ; repeat    Breast cancer: CBE today " normal, repeat annually; mammograms to be started at age 40    2.  Immunizations   Last Tdap 2013; repeat 2023   HPV 2011   Influenza 11/2020   MMR yes   Varicella yes    RTC in one year for an annual examination.    Latia Sterling, MS4    OBGYN Attending Addendum    I appreciate the note above by medical student, Latia Sterling.  I, Isela Morgan, was present with the medical student, who participated in the service and in the documentation of the note. I have verified the history and personally performed the physical exam and medical decision making. I have edited accordingly and agree with the assessment and plan of care as documented in the note.    Isela Morgan MD, MSCI    [facility]/OBGYN

## 2021-05-05 ENCOUNTER — OFFICE VISIT (OUTPATIENT)
Dept: OBGYN | Facility: CLINIC | Age: 36
End: 2021-05-05
Attending: OBSTETRICS & GYNECOLOGY
Payer: COMMERCIAL

## 2021-05-05 VITALS — WEIGHT: 160.3 LBS | HEIGHT: 67 IN | BODY MASS INDEX: 25.16 KG/M2

## 2021-05-05 DIAGNOSIS — R10.2 PELVIC PAIN IN FEMALE: ICD-10-CM

## 2021-05-05 DIAGNOSIS — M62.08 DIASTASIS RECTI: ICD-10-CM

## 2021-05-05 DIAGNOSIS — Z01.419 ENCOUNTER FOR GYNECOLOGICAL EXAMINATION WITHOUT ABNORMAL FINDING: Primary | ICD-10-CM

## 2021-05-05 PROCEDURE — G0463 HOSPITAL OUTPT CLINIC VISIT: HCPCS

## 2021-05-05 PROCEDURE — 99395 PREV VISIT EST AGE 18-39: CPT | Performed by: OBSTETRICS & GYNECOLOGY

## 2021-05-05 PROCEDURE — 99212 OFFICE O/P EST SF 10 MIN: CPT | Mod: 25 | Performed by: OBSTETRICS & GYNECOLOGY

## 2021-05-05 ASSESSMENT — MIFFLIN-ST. JEOR: SCORE: 1459.75

## 2021-05-05 NOTE — LETTER
2021       RE: Deb Olivera  [address]  [facility] [address]     Dear Colleague,    Thank you for referring your patient, Deb Olivera, to the [facility] [place] [facility]. Please see a copy of my visit note below.    [facility]  Gynecology Visit    SUBJECTIVE    Deb Olivera is a 35 year old  who is here for an annual examination with concerns of pelvic pain. Pain began following birth of son 1 year ago. Has been working with pelvic floor PT who were concerned about possible prolapse. Pain started diffuse across anterior pelvis, slowly localized to R groin/lower abdomen. Intermittent. No incontinence or leaking or urine. No burning or neuropathic pain. No changes to sensation or numbness. Not provoked by menses or sexual intercourse. Somewhat provoked by activity but also felt at rest and at night. Has not tried OTC medications. Minor relief from hot packs.    Interested in trying for pregnancy again. Still breastfeeding but is currently weaning off and expects to be done within several weeks. Menses have resumed. Has been using condoms for contraception. Is on a prenatal vitamin.     PAST MEDICAL HISTORY  Past Medical History:   Diagnosis Date     Hx of abnormal pap 2014    LSIL, colpo CIN1       MEDICATIONS  Current Outpatient Medications   Medication     acetaminophen (TYLENOL) 325 MG tablet     cholecalciferol (VITAMIN D3) 400 unit (10 mcg) TABS tablet     norethindrone (MICRONOR) 0.35 MG tablet     Prenatal Vit-Fe Fumarate-FA (PRENATAL COMPLETE PO)     No current facility-administered medications for this visit.      Facility-Administered Medications Ordered in Other Visits   Medication     bupivacaine (MARCAINE) 0.125 % injection (diluted from stock concentration by MD or CRNA)     fentaNYL (PF) (SUBLIMAZE) injection     fentaNYL (SUBLIMAZE) 2 mcg/mL, bupivacaine (MARCAINE) 0.125% in NaCl  0.9% EPIDURAL infusion     lidocaine-EPINEPHrine 1.5 %-1:[id number] injection       ALLERGIES  No Known Allergies    OBSTETRIC/GYNECOLOGIC HISTORY  Her LMP is: 21  Period cycle/length/flow/associated symptoms: q29 days  Current contraception: condoms  Number of partners in last year: 1  History of STDs: none  Component      Latest Ref Rng & Units 4/10/2018   HPV Source       SurePath   HPV 16 DNA      NEG:Negative Negative   HPV 18 DNA      NEG:Negative Negative   Other HR HPV      NEG:Negative Negative   Final Diagnosis       This patient's sample is negative for HPV DNA.   Specimen Description       Cervical Cells   PAP       NIL   Copath Report       Patient Name: NEGIN ALVARADO . Ratna .     History of abnormal Pap smear: Yes: LSIL in , all normal since    OB History    Para Term  AB Living   1 1 1 0 0 1   SAB TAB Ectopic Multiple Live Births   0 0 0 0 1      # Outcome Date GA Lbr Jake/2nd Weight Sex Delivery Anes PTL Lv   1 Term 20 38w5d 07:30 / 01:32 3.374 kg (7 lb 7 oz) F Vag-Spont EPI N ALEXIS      Name: AJITH JOHNSON      Apgar1: 9  Apgar5: 9       PAST SURGICAL HISTORY   Past Surgical History:   Procedure Laterality Date     ESOPHAGOSCOPY, GASTROSCOPY, DUODENOSCOPY (EGD), COMBINED N/A 2019    Procedure: COMBINED ESOPHAGOSCOPY, GASTROSCOPY, DUODENOSCOPY (EGD);  Surgeon: Vinay Childers MD;  Location:  GI       SOCIAL HISTORY  Social History     Tobacco Use     Smoking status: Former Smoker     Packs/day: 0.00     Years: 2.00     Pack years: 0.00     Types: Cigarettes     Smokeless tobacco: Never Used     Tobacco comment: when 19   Substance Use Topics     Alcohol use: Not Currently     Comment: occ     Drug use: No     Social History     Social History Narrative     Not on file       FAMILY HISTORY  Family History   Problem Relation Age of Onset     Diabetes Paternal Grandfather      Other Cancer Paternal Grandfather      Lipids Father      Other Cancer Father          "testicular     Hypertension Father      Other Cancer Maternal Grandmother         brain cancer     Lung Cancer Paternal Grandmother        REVIEW OF SYSTEMS  A 10 point review of systems including Constitutional, Eyes, Respiratory, Cardiovascular, Gastroenterology, Genitourinary, Integumentary, Musculoskeletal, and Psychiatric, were all negative, except for pertinent positives noted in the above HPI.    OBJECTIVE  Ht 1.702 m (5' 7\")   Wt 72.7 kg (160 lb 4.8 oz)   LMP 2021   BMI 25.11 kg/m      General: Alert, without distress  HEENT: normocephalic, without obvious abnormality; normal thyroid gland   Breast: Within normal limits bilaterally, no nipple discharge, no lymphadenopathy  Cardiovascular: regular rate and rhythm, no murmurs/rubs/gallops   Lungs: clear to auscultation bilaterally   Abdomen: soft, non-tender, non-distended, normal bowel sounds; small diastasis < 2cm at umbilicus   Pelvic: normal external female genitalia; normal vagina without discharge; normal cervix without lesions/masses; no evidence of prolapse of cervix, anterior or posterior vagina either at rest or with valsalva; uterus small, anteverted, mobile, nontender; adnexae nontender and without masses; normal anus/perineum   Extremities: normal    LABS:   Breast Biopsy 2020:  Breast, right, 11:00, retroareolar: Ultrasound guided core biopsy:   - Benign breast tissue with stromal fibrosis, negative for in situ and   invasive carcinoma     ASSESSMENT  Deb Olivera is a 35 year old  who is here for annual visit with pelvic pain and pre-conception counseling.  Normal breast and pelvic exam. Pelvic pain w/ unclear etiology. No cystocele or prolapse on exam. No hernia. Will obtain TVUS to r/o uterine fibroids, ovarian cyst, or other pelvic pathology.      PLAN  -pelvic pain:    -General PT referral as Deb is wondering if she has anterior abdominal wall tenderness from diastasis   -TVUS  -preconception counseling:    -Cont " prenatal vitamin   -Pre-conception counseling provided. Encouraged 18 month birth to birth window so okay to start trying. Also discussed fertility windows, most fertile day 13 of 29 day cycle.     Age 19-39 Annual Preventive Exam  1.  Screening:   Cervical cancer: last pap 2018; repeat 2023   Breast cancer: CBE today normal, repeat annually; mammograms to be started at age 40    2.  Immunizations   Last Tdap 2013; repeat 2023   HPV 2011   Influenza 11/2020   MMR yes   Varicella yes    RTC in one year for an annual examination.    Latia Sterling, MS4    OBGYN Attending Addendum    I appreciate the note above by medical student, Latia Sterling.  I, Isela Morgan, was present with the medical student, who participated in the service and in the documentation of the note. I have verified the history and personally performed the physical exam and medical decision making. I have edited accordingly and agree with the assessment and plan of care as documented in the note.    Isela Morgan MD, MSCI    [facility]

## 2021-05-12 ENCOUNTER — ANCILLARY PROCEDURE (OUTPATIENT)
Dept: ULTRASOUND IMAGING | Facility: CLINIC | Age: 36
End: 2021-05-12
Attending: OBSTETRICS & GYNECOLOGY
Payer: COMMERCIAL

## 2021-05-12 DIAGNOSIS — R10.2 PELVIC PAIN IN FEMALE: ICD-10-CM

## 2021-05-12 PROCEDURE — 76830 TRANSVAGINAL US NON-OB: CPT | Mod: 26 | Performed by: OBSTETRICS & GYNECOLOGY

## 2021-05-12 PROCEDURE — 76830 TRANSVAGINAL US NON-OB: CPT

## 2021-05-18 ENCOUNTER — VIRTUAL VISIT (OUTPATIENT)
Dept: FAMILY MEDICINE | Facility: CLINIC | Age: 36
End: 2021-05-18
Payer: COMMERCIAL

## 2021-05-18 DIAGNOSIS — R07.0 THROAT PAIN: ICD-10-CM

## 2021-05-18 DIAGNOSIS — R07.0 THROAT PAIN: Primary | ICD-10-CM

## 2021-05-18 LAB
DEPRECATED S PYO AG THROAT QL EIA: NEGATIVE
SPECIMEN SOURCE: NORMAL
SPECIMEN SOURCE: NORMAL
STREP GROUP A PCR: NOT DETECTED

## 2021-05-18 PROCEDURE — 99N1174 PR STATISTIC STREP A RAPID: Performed by: PREVENTIVE MEDICINE

## 2021-05-18 PROCEDURE — 99213 OFFICE O/P EST LOW 20 MIN: CPT | Mod: GT | Performed by: PREVENTIVE MEDICINE

## 2021-05-18 PROCEDURE — 87651 STREP A DNA AMP PROBE: CPT | Performed by: PREVENTIVE MEDICINE

## 2021-05-18 NOTE — RESULT ENCOUNTER NOTE
Deb, your test results were within normal limits.  Strep test was negative.  If symptoms are not better in 3 days then I would recommend an in person evaluation.     Please do not hesitate to call us at [phone] if you have any questions or concerns.    Thank you,    Griselda Holt MD MPH

## 2021-05-18 NOTE — PROGRESS NOTES
"Deb is a 35 year old who is being evaluated via a billable video visit.      How would you like to obtain your AVS? MyChart  If the video visit is dropped, the invitation should be resent by: Text to cell phone: [phone]  Will anyone else be joining your video visit? No      Video Start Time: 0740 AM    Assessment & Plan     Throat pain  -will come in for strep swab today  -hydration and monitor temperature  -Covid vaccine completed  -Saline gargles  -await results   - Streptococcus A Rapid Scr w Reflx to PCR    20 minutes spent on the date of the encounter doing chart review, history and exam, documentation and further activities per the note       BMI:   Estimated body mass index is 25.11 kg/m  as calculated from the following:    Height as of 5/5/21: 1.702 m (5' 7\").    Weight as of 5/5/21: 72.7 kg (160 lb 4.8 oz).       Return in about 1 day (around 5/19/2021) for labs.    Griselda Holt MD MPH    [facility]    Subjective   Deb is a 35 year old who presents for the following health issues :    HPI       Acute Illness  Acute illness concerns: Sore throat with white spots on the tonsils.   Onset/Duration: 3 days and increased since last night, localized to one side.   Symptoms:  Fever: no  Chills/Sweats: no  Headache (location?): no  Sinus Pressure: no  Conjunctivitis:  no  Ear Pain: no  Rhinorrhea: no  Congestion: no  Sore Throat: no  Cough: no  Wheeze: no  Decreased Appetite: no  Nausea: no  Vomiting: no  Diarrhea: no  Dysuria/Freq.: no  Dysuria or Hematuria: no  Fatigue/Achiness: no  Sick/Strep Exposure: No, daughter who is 13 months old is in ,  also with sore throat  Therapies tried and outcome: None      Review of Systems   Constitutional, HEENT, cardiovascular, pulmonary, gi and gu systems are negative, except as otherwise noted.      Objective           Vitals:  No vitals were obtained today due to virtual visit.    Physical Exam   GENERAL: Healthy, alert and " no distress  EYES: Eyes grossly normal to inspection.  No discharge or erythema, or obvious scleral/conjunctival abnormalities.  RESP: No audible wheeze, cough, or visible cyanosis.  No visible retractions or increased work of breathing.    SKIN: Visible skin clear. No significant rash, abnormal pigmentation or lesions.  NEURO: Cranial nerves grossly intact.  Mentation and speech appropriate for age.  PSYCH: Mentation appears normal, affect normal/bright, judgement and insight intact, normal speech and appearance well-groomed.            Video-Visit Details    Type of service:  Video Visit    Video End Time:0746 AM    Originating Location (pt. Location): Home    Distant Location (provider location):  [facility]     Platform used for Video Visit: [employer]

## 2021-09-25 ENCOUNTER — HEALTH MAINTENANCE LETTER (OUTPATIENT)
Age: 36
End: 2021-09-25

## 2021-11-10 ENCOUNTER — OFFICE VISIT (OUTPATIENT)
Dept: FAMILY MEDICINE | Facility: CLINIC | Age: 36
End: 2021-11-10
Payer: COMMERCIAL

## 2021-11-10 VITALS
TEMPERATURE: 98.2 F | SYSTOLIC BLOOD PRESSURE: 112 MMHG | BODY MASS INDEX: 24.9 KG/M2 | DIASTOLIC BLOOD PRESSURE: 75 MMHG | WEIGHT: 159 LBS | OXYGEN SATURATION: 97 % | HEART RATE: 95 BPM

## 2021-11-10 DIAGNOSIS — R59.1 LYMPHADENOPATHY: Primary | ICD-10-CM

## 2021-11-10 DIAGNOSIS — Z23 NEED FOR PROPHYLACTIC VACCINATION AND INOCULATION AGAINST INFLUENZA: ICD-10-CM

## 2021-11-10 LAB
ERYTHROCYTE [DISTWIDTH] IN BLOOD BY AUTOMATED COUNT: 12.5 % (ref 10–15)
HCT VFR BLD AUTO: 40.4 % (ref 35–47)
HGB BLD-MCNC: 13.3 G/DL (ref 11.7–15.7)
MCH RBC QN AUTO: 30.9 PG (ref 26.5–33)
MCHC RBC AUTO-ENTMCNC: 32.9 G/DL (ref 31.5–36.5)
MCV RBC AUTO: 94 FL (ref 78–100)
PLATELET # BLD AUTO: 311 10E3/UL (ref 150–450)
RBC # BLD AUTO: 4.31 10E6/UL (ref 3.8–5.2)
WBC # BLD AUTO: 6.3 10E3/UL (ref 4–11)

## 2021-11-10 PROCEDURE — 99214 OFFICE O/P EST MOD 30 MIN: CPT | Mod: 25 | Performed by: PHYSICIAN ASSISTANT

## 2021-11-10 PROCEDURE — 36415 COLL VENOUS BLD VENIPUNCTURE: CPT | Performed by: PHYSICIAN ASSISTANT

## 2021-11-10 PROCEDURE — 85027 COMPLETE CBC AUTOMATED: CPT | Performed by: PHYSICIAN ASSISTANT

## 2021-11-10 PROCEDURE — 90471 IMMUNIZATION ADMIN: CPT | Performed by: PHYSICIAN ASSISTANT

## 2021-11-10 PROCEDURE — 90686 IIV4 VACC NO PRSV 0.5 ML IM: CPT | Performed by: PHYSICIAN ASSISTANT

## 2021-11-10 NOTE — PATIENT INSTRUCTIONS
Please call [facility] Sched[facility] at [phone]  to set up the ultrasound of your neck if symptoms persist for another 2-3 weeks.

## 2021-11-10 NOTE — PROGRESS NOTES
"  Assessment & Plan     Lymphadenopathy  I reassured Deb that it is normal to have reactive lymph nodes that will come and go in response to our body's immune system fighting off some sort of illness.  Typically if lump grows in size or is present for more than 6 weeks, further evaluation is warranted.  CBC done today and normal.    If she continues to feel the lymph node in 2-3 weeks, I suggest we do an ultrasound (order placed).     - CBC with platelets; Future  - US Head Neck Soft Tissue; Future  - CBC with platelets    Due for a covid booster, however will wait just another couple weeks as it is very common to after lymphadenopathy after covid vaccine and I do not want to add any further variables at this point.     Need for prophylactic vaccination and inoculation against influenza  due  - INFLUENZA VACCINE IM > 6 MONTHS VALENT IIV4 (AFLURIA/FLUZONE)    Updated HM to reflect when next pap is due.   I discussed the new pap recommendations regarding screening.  Explained the rationale for increased intervals between paps.  Questions asked and answered.  She does agree to this regimen.          BMI:   Estimated body mass index is 24.9 kg/m  as calculated from the following:    Height as of 5/5/21: 1.702 m (5' 7\").    Weight as of this encounter: 72.1 kg (159 lb).           Return in about 2 weeks (around 11/24/2021) for US if it does not resolve on it's own. .    20 minutes spent on the date of the encounter doing chart review, history and exam, documentation and further activities per the note   SANDRO Justice [facility] BLAYNE Byod is a 35 year old who presents for the following health issues     History of Present Illness       She eats 2-3 servings of fruits and vegetables daily.She consumes 0 sweetened beverage(s) daily.She exercises with enough effort to increase her heart rate 9 or less minutes per day.  She exercises with enough effort to increase her heart rate 3 " or less days per week.   She is taking medications regularly.       Concern - Swollen Gland  Onset: Ongoing 1 month   Description: Michael presents to discuss right-sided swelling of neck. Area is swollen and painful, pt notes it has remained the same size. No family history or personal history of similar problem. No known injury to area per pt.    Intensity: mild, moderate  Progression of Symptoms:  same  Previous history of similar problem: NA   Therapies tried and outcome: None        Review of Systems   Constitutional, HEENT, cardiovascular, pulmonary, gi and gu systems are negative, except as otherwise noted.      Objective    /75 (BP Location: Left arm, Patient Position: Chair, Cuff Size: Adult Regular)   Pulse 95   Temp 98.2  F (36.8  C) (Tympanic)   Wt 72.1 kg (159 lb)   SpO2 97%   BMI 24.90 kg/m    Body mass index is 24.9 kg/m .  Physical Exam   GENERAL: healthy, alert and no distress  EYES: Eyes grossly normal to inspection, PERRL and conjunctivae and sclerae normal  HENT: ear canals and TM's normal, nose and mouth without ulcers or lesions  NECK: no adenopathy, no asymmetry, masses, or scars and thyroid normal to palpation, she points to the swollen lump in posterior cervical triangle (just under the sternocleidomastoid muscle).  I did not appreciate a mass on exam today.   MS: no gross musculoskeletal defects noted, no edema    Results for orders placed or performed in visit on 11/10/21 (from the past 24 hour(s))   CBC with platelets   Result Value Ref Range    WBC Count 6.3 4.0 - 11.0 10e3/uL    RBC Count 4.31 3.80 - 5.20 10e6/uL    Hemoglobin 13.3 11.7 - 15.7 g/dL    Hematocrit 40.4 35.0 - 47.0 %    MCV 94 78 - 100 fL    MCH 30.9 26.5 - 33.0 pg    MCHC 32.9 31.5 - 36.5 g/dL    RDW 12.5 10.0 - 15.0 %    Platelet Count 311 150 - 450 10e3/uL

## 2021-11-30 ENCOUNTER — ANCILLARY PROCEDURE (OUTPATIENT)
Dept: ULTRASOUND IMAGING | Facility: CLINIC | Age: 36
End: 2021-11-30
Attending: PHYSICIAN ASSISTANT
Payer: COMMERCIAL

## 2021-11-30 DIAGNOSIS — R59.1 LYMPHADENOPATHY: ICD-10-CM

## 2021-11-30 PROCEDURE — 76536 US EXAM OF HEAD AND NECK: CPT | Performed by: RADIOLOGY

## 2021-12-27 ENCOUNTER — MYC MEDICAL ADVICE (OUTPATIENT)
Dept: FAMILY MEDICINE | Facility: CLINIC | Age: 36
End: 2021-12-27
Payer: COMMERCIAL

## 2022-02-17 PROBLEM — Z01.419 ENCOUNTER FOR GYNECOLOGICAL EXAMINATION WITHOUT ABNORMAL FINDING: Status: ACTIVE | Noted: 2021-05-07

## 2022-03-23 ENCOUNTER — TELEPHONE (OUTPATIENT)
Dept: OBGYN | Facility: CLINIC | Age: 37
End: 2022-03-23
Payer: COMMERCIAL

## 2022-03-23 DIAGNOSIS — Z32.01 PREGNANCY TEST POSITIVE: Primary | ICD-10-CM

## 2022-03-23 NOTE — TELEPHONE ENCOUNTER
[facility] Call Center    Phone Message    May a detailed message be left on voicemail: yes     Reason for Call: Order(s): Other:   Reason for requested: US new OBI  Date needed: 4/13/22  Provider name: Taran Greene      Action Taken: Message routed to:  [facility] SHERI    Travel Screening: Not Applicable

## 2022-03-30 ENCOUNTER — LAB (OUTPATIENT)
Dept: LAB | Facility: CLINIC | Age: 37
End: 2022-03-30
Payer: COMMERCIAL

## 2022-03-30 ENCOUNTER — TELEPHONE (OUTPATIENT)
Dept: OBGYN | Facility: CLINIC | Age: 37
End: 2022-03-30

## 2022-03-30 DIAGNOSIS — O20.0 THREATENED ABORTION: ICD-10-CM

## 2022-03-30 DIAGNOSIS — O20.0 THREATENED ABORTION: Primary | ICD-10-CM

## 2022-03-30 LAB — B-HCG SERPL-ACNC: 7 IU/L (ref 0–5)

## 2022-03-30 PROCEDURE — 36415 COLL VENOUS BLD VENIPUNCTURE: CPT

## 2022-03-30 PROCEDURE — 84702 CHORIONIC GONADOTROPIN TEST: CPT

## 2022-03-31 ENCOUNTER — TELEPHONE (OUTPATIENT)
Dept: OBGYN | Facility: CLINIC | Age: 37
End: 2022-03-31
Payer: COMMERCIAL

## 2022-03-31 NOTE — TELEPHONE ENCOUNTER
----- Message from Dayanna Cat RN sent at 3/30/2022  2:37 PM CDT -----  Regarding: FW: [place] [facility]    ----- Message -----  From: Dayanna Cat RN  Sent: 3/30/2022   2:37 PM CDT  To: [facility]  Subject: [facility]

## 2022-03-31 NOTE — TELEPHONE ENCOUNTER
Called Deb with BHCG results of 7.  Advised that this is low, but not yet negative.  Will repeat tomorrow as originally planned.    Bleeding continues today, like menstrual period.  Pain is manageable with tylenol.    Reviewed ED precautions.

## 2022-04-01 ENCOUNTER — LAB (OUTPATIENT)
Dept: LAB | Facility: CLINIC | Age: 37
End: 2022-04-01
Payer: COMMERCIAL

## 2022-04-01 DIAGNOSIS — O20.0 THREATENED ABORTION: ICD-10-CM

## 2022-04-01 LAB — B-HCG SERPL-ACNC: 3 IU/L (ref 0–5)

## 2022-04-01 PROCEDURE — 84702 CHORIONIC GONADOTROPIN TEST: CPT

## 2022-04-01 PROCEDURE — 36415 COLL VENOUS BLD VENIPUNCTURE: CPT

## 2022-06-07 ENCOUNTER — TELEPHONE (OUTPATIENT)
Dept: OBGYN | Facility: CLINIC | Age: 37
End: 2022-06-07
Payer: COMMERCIAL

## 2022-06-07 DIAGNOSIS — Z32.01 PREGNANCY TEST POSITIVE: Primary | ICD-10-CM

## 2022-06-07 NOTE — TELEPHONE ENCOUNTER
[facility] Call Center    Phone Message    May a detailed message be left on voicemail: yes     Reason for Call: Order(s): Other:   Reason for requested: US  Date needed: 6/20/22  Provider name: Concetta      Action Taken: Other: whs    Travel Screening: Not Applicable

## 2022-06-14 ENCOUNTER — TELEPHONE (OUTPATIENT)
Dept: OBGYN | Facility: CLINIC | Age: 37
End: 2022-06-14

## 2022-06-14 ENCOUNTER — LAB (OUTPATIENT)
Dept: LAB | Facility: CLINIC | Age: 37
End: 2022-06-14
Payer: COMMERCIAL

## 2022-06-14 DIAGNOSIS — O20.0 THREATENED ABORTION: ICD-10-CM

## 2022-06-14 DIAGNOSIS — O20.0 THREATENED ABORTION: Primary | ICD-10-CM

## 2022-06-14 LAB — B-HCG SERPL-ACNC: 2564 IU/L (ref 0–5)

## 2022-06-14 PROCEDURE — 36415 COLL VENOUS BLD VENIPUNCTURE: CPT

## 2022-06-14 PROCEDURE — 84702 CHORIONIC GONADOTROPIN TEST: CPT

## 2022-06-14 NOTE — TELEPHONE ENCOUNTER
Patient left a message with concerns of a possible miscarriage. She is 6 weeks pregnant and has her first appointment scheduled for June 24th for US and first OB appointment.     Spoke with patient and asked to describe the cramping and how long she has had the cramping.  Patient stated the cramping is uncomfortable, not located on just one side or the other, it comes and goes, and it started last Thursday.  No bleeding or spotting at this time.  Patient is expressing anxiety due to previous loss.    Did put in an order for patient to have a beta Hcg drawn today and then again in 48 hours. Will route a note off to the midwife pool to let them know, and will watch for the results. Did instruct patient to call if she is having bleeding or pain that is not going away or is located on just one side.

## 2022-06-16 ENCOUNTER — LAB (OUTPATIENT)
Dept: LAB | Facility: CLINIC | Age: 37
End: 2022-06-16
Payer: COMMERCIAL

## 2022-06-16 DIAGNOSIS — O20.0 THREATENED ABORTION: ICD-10-CM

## 2022-06-16 LAB — B-HCG SERPL-ACNC: 2685 IU/L (ref 0–5)

## 2022-06-16 PROCEDURE — 84702 CHORIONIC GONADOTROPIN TEST: CPT

## 2022-06-16 PROCEDURE — 36415 COLL VENOUS BLD VENIPUNCTURE: CPT

## 2022-06-17 ENCOUNTER — TELEPHONE (OUTPATIENT)
Dept: OBGYN | Facility: CLINIC | Age: 37
End: 2022-06-17
Payer: COMMERCIAL

## 2022-06-17 ENCOUNTER — HOSPITAL ENCOUNTER (OUTPATIENT)
Dept: ULTRASOUND IMAGING | Facility: CLINIC | Age: 37
Discharge: HOME | End: 2022-06-17
Attending: MIDWIFE | Admitting: MIDWIFE
Payer: COMMERCIAL

## 2022-06-17 DIAGNOSIS — O20.0 THREATENED ABORTION: Primary | ICD-10-CM

## 2022-06-17 DIAGNOSIS — O20.0 THREATENED ABORTION: ICD-10-CM

## 2022-06-17 PROCEDURE — 76801 OB US < 14 WKS SINGLE FETUS: CPT

## 2022-06-17 NOTE — TELEPHONE ENCOUNTER
----- Message from Betsy Ward RN sent at 6/17/2022  9:07 AM CDT -----  Left a message and saw her beta HCG results and has questions.

## 2022-06-17 NOTE — CONFIDENTIAL NOTE
Called Deb and discussed [facility] results.  She is teary and sad because of both current pregnancy and another recent loss.    She is having mild cramping, no bleeding, and reports that she has lost all pregnancy symptoms.    Would like US to r/o ectopic.    Contacted Lucía Mcclellan, who will call her after ultrasound is complete.

## 2022-06-17 NOTE — TELEPHONE ENCOUNTER
Reviewed hCG and US results with patient.     Reviewed very limited rise in hCG is pointing toward MAB, but discussed that US is inconclusive at this time and repeat US should be done in 11 days. Pt can do another hCG if desired, order placed. If heavy bleeding or severe pain patient should present to ED. F/U US ordered.     ANANT Palomino CNM

## 2022-06-18 PROBLEM — O20.0 THREATENED MISCARRIAGE: Status: ACTIVE | Noted: 2022-06-18

## 2022-06-18 PROBLEM — G56.00 CARPAL TUNNEL SYNDROME DURING PREGNANCY: Status: RESOLVED | Noted: 2020-03-26 | Resolved: 2022-06-18

## 2022-06-18 PROBLEM — O26.899 CARPAL TUNNEL SYNDROME DURING PREGNANCY: Status: RESOLVED | Noted: 2020-03-26 | Resolved: 2022-06-18

## 2022-06-20 ENCOUNTER — LAB (OUTPATIENT)
Dept: LAB | Facility: CLINIC | Age: 37
End: 2022-06-20
Payer: COMMERCIAL

## 2022-06-20 DIAGNOSIS — O20.0 THREATENED ABORTION: ICD-10-CM

## 2022-06-20 LAB — B-HCG SERPL-ACNC: 2925 IU/L (ref 0–5)

## 2022-06-20 PROCEDURE — 84702 CHORIONIC GONADOTROPIN TEST: CPT

## 2022-06-20 PROCEDURE — 36415 COLL VENOUS BLD VENIPUNCTURE: CPT

## 2022-06-27 ENCOUNTER — TELEPHONE (OUTPATIENT)
Dept: OBGYN | Facility: CLINIC | Age: 37
End: 2022-06-27

## 2022-06-27 NOTE — TELEPHONE ENCOUNTER
Pt called asking if she needed to do the US on Thursday because she had bleeding over the weekend.  Pt stated that the bleeding was very light but she is having cramping today.  The writer offered another Bhcg level to confirm.  Pt did not want a blood test.  Pt asked if she could take Ibuprofen.  This writer stated that it would be better that she has had confirmation of the MAB.  Pt agreed to plan.

## 2022-06-27 NOTE — TELEPHONE ENCOUNTER
----- Message from Betsy Ward RN sent at 6/27/2022 11:09 AM CDT -----  Regarding: bleeding and cramping over the weekend  Patient is having a miscarriage and over the weekend had bleeding and cramping. Does have an US scheduled for Thursday to confirm that this is a miscarriage. Patient is hoping not to come in for the US, but states she will if she needs to.  Patient has been through this before.

## 2022-06-30 ENCOUNTER — ANCILLARY PROCEDURE (OUTPATIENT)
Dept: ULTRASOUND IMAGING | Facility: CLINIC | Age: 37
End: 2022-06-30
Attending: MIDWIFE
Payer: COMMERCIAL

## 2022-06-30 ENCOUNTER — OFFICE VISIT (OUTPATIENT)
Dept: OBGYN | Facility: CLINIC | Age: 37
End: 2022-06-30
Attending: REGISTERED NURSE
Payer: COMMERCIAL

## 2022-06-30 VITALS
HEART RATE: 105 BPM | HEIGHT: 67 IN | SYSTOLIC BLOOD PRESSURE: 99 MMHG | BODY MASS INDEX: 24.6 KG/M2 | WEIGHT: 156.7 LBS | DIASTOLIC BLOOD PRESSURE: 67 MMHG

## 2022-06-30 DIAGNOSIS — O03.9 SPONTANEOUS PREGNANCY LOSS: ICD-10-CM

## 2022-06-30 DIAGNOSIS — O20.0 THREATENED ABORTION: ICD-10-CM

## 2022-06-30 DIAGNOSIS — O03.9 COMPLETE ABORTION: Primary | ICD-10-CM

## 2022-06-30 PROCEDURE — 76830 TRANSVAGINAL US NON-OB: CPT | Mod: 26 | Performed by: OBSTETRICS & GYNECOLOGY

## 2022-06-30 PROCEDURE — 76830 TRANSVAGINAL US NON-OB: CPT

## 2022-06-30 PROCEDURE — 99202 OFFICE O/P NEW SF 15 MIN: CPT | Mod: 25 | Performed by: REGISTERED NURSE

## 2022-06-30 PROCEDURE — G0463 HOSPITAL OUTPT CLINIC VISIT: HCPCS

## 2022-06-30 NOTE — LETTER
6/30/2022       RE: Deb Olivera  [address]  [facility] [address]     Dear Colleague,    Thank you for referring your patient, Deb Olivera, to the [facility] [place] [facility]. Please see a copy of my visit note below.    Subjective:  Deb Olivera is a 36 year old female who presents to review result of ultrasound.      Pt had ultrasound performed here in clinic. The first US was done on 6/17/22 which showed:  IMPRESSION:   1.  Single intrauterine gestation at 5 weeks 5 days based on mean sac diameter. However, there is no yolk sac, embryo or heartbeat which is suspicious for pregnancy failure and blighted ovum.  2.  Serial beta-hCG values may be beneficial for confirmation. Repeat short-term ultrasound may also be helpful.     The patient has been experiencing cramping on and off the past 2 weeks, over the weekend (6 days ago) she had bleeding with medium sized clots, 3 days ago she had severe cramping with larger amount of blood and now cramping is minimal today with light bleeding than has continued to taper down.      Repeat US today confirms complete AB without retained products of conception.     Impression: Normal size uterus, endometrium 8.7mm, normal. Small hypoechoic area in the anterior myometrium. Normal ovaries.    Patient is seen here and informed of the results. Pt expresses appropriate sadness at loss, has adequate support from friends and .  Reassured that the loss could not have been stopped by her actions or any other persons actions.  Seeing a therapist and feels mood is well controlled.     Reports she has had a negative experience with 2 recent miscarriages and being passed from person to person through the clinic over the phone. She is very teaful during visit. She is interested in speaking to an OBGYN to discuss preconception counseling.      Review Of Systems  ROS: 10 point ROS neg other than the  "symptoms noted above in the HPI.    OBJECTIVE:   Blood pressure 99/67, pulse 105, height 1.702 m (5' 7\"), weight 71.1 kg (156 lb 11.2 oz), unknown if currently breastfeeding.  OBGyn Exam  O    ASSESSMENT:  -  Complete AB  - Rh POS    PLAN:   - Offered support and encouraged self care.  Offered coordination with mental health services prn.   - Discussed possible causes of miscarriage including chromosome abnormalities.  Pt verbalized understanding that nothing can be done to prevent a miscarriage from occuring.    -  Reviewed how/why to call or present to the emergency room if she were to develop heavy bleeding saturating a maxi pad more frequently than every hour or passing large clots. Pt instructed to call clinic if she develops a fever.   - Reviewed recommendation for home pregnancy test 3 weeks after miscarriage to ensure that all pregnancy tissue has passed.    - Recommend wait one normal menses before trying to become pregnant again.  Continue prenatal vitamin and avoidance of possible teratogens. Consider vitamin D supplement.   - Pt verbalized understanding of and agreement to plan of care.      - Reviewed if she were to become pregnant again could initiate early serial bhcg given history along with early US.     At visit with OBGYN: pt is interested in discussing other supplements recommended?     ANANT GarciaM    23 minutes spent on the date of the encounter doing chart review, history and exam, documentation and further activities as noted above      "

## 2022-06-30 NOTE — PROGRESS NOTES
"Subjective:  Deb Olivera is a 36 year old female who presents to review result of ultrasound.      Pt had ultrasound performed here in clinic. The first US was done on 6/17/22 which showed:  IMPRESSION:   1.  Single intrauterine gestation at 5 weeks 5 days based on mean sac diameter. However, there is no yolk sac, embryo or heartbeat which is suspicious for pregnancy failure and blighted ovum.  2.  Serial beta-hCG values may be beneficial for confirmation. Repeat short-term ultrasound may also be helpful.     The patient has been experiencing cramping on and off the past 2 weeks, over the weekend (6 days ago) she had bleeding with medium sized clots, 3 days ago she had severe cramping with larger amount of blood and now cramping is minimal today with light bleeding than has continued to taper down.      Repeat US today confirms complete AB without retained products of conception.     Impression: Normal size uterus, endometrium 8.7mm, normal. Small hypoechoic area in the anterior myometrium. Normal ovaries.    Patient is seen here and informed of the results. Pt expresses appropriate sadness at loss, has adequate support from friends and .  Reassured that the loss could not have been stopped by her actions or any other persons actions.  Seeing a therapist and feels mood is well controlled.     Reports she has had a negative experience with 2 recent miscarriages and being passed from person to person through the clinic over the phone. She is very teaful during visit. She is interested in speaking to an OBGYN to discuss preconception counseling.      Review Of Systems  ROS: 10 point ROS neg other than the symptoms noted above in the HPI.    OBJECTIVE:   Blood pressure 99/67, pulse 105, height 1.702 m (5' 7\"), weight 71.1 kg (156 lb 11.2 oz), unknown if currently breastfeeding.  OBGyn Exam  O    ASSESSMENT:  -  Complete AB  - Rh POS    PLAN:   - Offered support and encouraged self care.  Offered coordination " with mental health services prn.   - Discussed possible causes of miscarriage including chromosome abnormalities.  Pt verbalized understanding that nothing can be done to prevent a miscarriage from occuring.    -  Reviewed how/why to call or present to the emergency room if she were to develop heavy bleeding saturating a maxi pad more frequently than every hour or passing large clots. Pt instructed to call clinic if she develops a fever.   - Reviewed recommendation for home pregnancy test 3 weeks after miscarriage to ensure that all pregnancy tissue has passed.    - Recommend wait one normal menses before trying to become pregnant again.  Continue prenatal vitamin and avoidance of possible teratogens. Consider vitamin D supplement.   - Pt verbalized understanding of and agreement to plan of care.      - Reviewed if she were to become pregnant again could initiate early serial bhcg given history along with early US.     At visit with OBGYN: pt is interested in discussing other supplements recommended?     ANANT Garcia CNM    23 minutes spent on the date of the encounter doing chart review, history and exam, documentation and further activities as noted above

## 2022-12-02 ENCOUNTER — TELEPHONE (OUTPATIENT)
Dept: OBGYN | Facility: CLINIC | Age: 37
End: 2022-12-02

## 2022-12-02 DIAGNOSIS — Z32.01 PREGNANCY TEST POSITIVE: Primary | ICD-10-CM

## 2022-12-02 NOTE — TELEPHONE ENCOUNTER
Called patient.  She took a home pregnancy test and believes she is 4w4d by LMP.      Per Anabella Omer's note on 6/30, patient can receive serial HcGs until first ultrasound on 12/22.  Ordered first 2 and patient plans to have them drawn Monday and Wednesday.      Reaching out to CNMs to clarify how often she should have HcGs drawn prior to first appointment.     Placed orders for 12/22 US.

## 2022-12-02 NOTE — TELEPHONE ENCOUNTER
[facility] Call Center    Phone Message    May a detailed message be left on voicemail: yes     Reason for Call: Order(s): Other:   Reason for requested: OBI US  Date needed: 12/22/22  Provider name: Taran Greene      Action Taken: Message routed to:  [facility] SHERI    Travel Screening: Not Applicable

## 2022-12-05 NOTE — TELEPHONE ENCOUNTER
"Called patient to pass on Yana Tolentino [facility] message:    \"One set of Hcg's should be enough,unless the patient requests more.\"    Reached . Left message to call clinic back or send My Chart message if she would like additional orders for HCG.  "

## 2023-01-31 NOTE — DISCHARGE SUMMARY
"Postpartum Note and Discharge Summary  Postpartum Day #1  SIGNIFICANT PROBLEMS:  Patient Active Problem List    Diagnosis Date Noted     Generalized anxiety disorder 2011     Priority: High     Labor and delivery, indication for care 2020     Priority: Medium     Carpal tunnel syndrome during pregnancy 2020     Priority: Medium     Encounter for supervision of normal first pregnancy in third trimester - [facility] 09/10/2019     Priority: Medium     2019: level 2 ultrasound- placenta posterior.       Hx of abnormal cervical Pap smear 09/10/2019     Priority: Medium     14: LSIL, colpo biopsy CIN1  4/23/15: NIL, HPV neg  4/10/18: NIL, HPV neg - routine screening per [facility]          Migraine aura without headache 2014     Priority: Medium     ADMISSION DIAGNOSIS:  Labor and Delivery   DISCHARGE DIAGNOSIS:     HOSPITAL COURSE:  38w6d  Deb Olivera is a 34 year old female     [facility] 10:43 AM in ruptured with no labor.     Delivery Note:   IUP at 38 weeks 5 days gestation delivered on April 3, 2020.     delivery of a viable Female infant.  Weight : 7 pounds 7 ounces   Apgars of 9 at 1 minute and 9 at 5 minutes.  Labor was augmented.  Medications administered  in labor:  Pain Rx Epidural; Antibiotics No; Other no  Perineum: 3rd degree repaired by Eden Llanes CNM  Placenta-mechanism: spontaneous, intact,  with a 3 vessel cord. IV oxytocin was given After delivery of baby  Quantitative Blood Loss was 184 cc.   Complications of labor and delivery: None  Anticipated Discharge Date: 4/3/20  Birth attendants: Eden Llanes CNM    INTERVAL HISTORY:  /62   Pulse 116   Temp 98.1  F (36.7  C) (Oral)   Resp 16   Ht 1.702 m (5' 7\")   Wt 78.5 kg (173 lb)   LMP 2019 (Approximate)   SpO2 98%   Breastfeeding No   BMI 27.10 kg/m    Pt stable, baby is rooming in.   Breast feeding status:initiated and well " established  Complications since 2 hours post delivery: None  Patient is tolerating activity well, Voiding without difficulty, cramping is minimal, is relieved by Ibuprophen and is relieved by ice pack, lochia is decreasing and patient denies clots.  Perineal pain is is relieved by Ibuprophen and is relieved by ice pack.  The perineum laceration is well approximated    Postpartum hemoglobin   Hemoglobin   Date Value Ref Range Status   2020 9.8 (L) 11.7 - 15.7 g/dL Final     Deb is asymptomatic of anemia.    Prenatal Labs:   Lab Results   Component Value Date    ABO O 2020    RH Pos 2020    AS Neg 2020    HEPBANG Nonreactive 09/10/2019    RUQIGG 24 09/10/2019     Rubella: immune  History of depression and anxiety, has a therapist, will continue care with that person and will reach out if any other help needed. Postpartum depression warning signs reviewed.    ASSESSMENT/PLAN:  Normal postpartum exam, stable Post-partum day #1  Complications:none  Plan d/c home today. Home Visit Ordered- Yes: first time breast feeding mother.   RTC 2 weeks for phone visit and 6 wks pp.  Postpartum warning s/s reviewed, including bleeding/clots, fever, mastitis, or depression  Continue prenatal vitamins  Birthcontrol planned:Undecided. Fertility and contraception options reviewed will likely use POPs.    PROCEDURES:      Current Discharge Medication List      CONTINUE these medications which have NOT CHANGED    Details   cholecalciferol (VITAMIN D3) 400 unit (10 mcg) TABS tablet Take by mouth daily      Prenatal Vit-Fe Fumarate-FA (PRENATAL COMPLETE PO)       hydrOXYzine (VISTARIL) 25 MG capsule Take 1 capsule (25 mg) by mouth 3 times daily as needed for anxiety  Qty: 30 capsule, Refills: 0    Associated Diagnoses: Encounter for supervision of normal first pregnancy in third trimester; Anxiety           Eden Llanes CNM     Anesthesia Type: 1% lidocaine with epinephrine

## 2023-03-10 NOTE — PROGRESS NOTES
Assessment & Plan     Achilles tendon pain  Tendonitis, bursitis, or other  To sports med if pt not helpful  - Physical Therapy Referral; Future    Breast pain, right  Mild but given history:  Will get imaging to make sure benign  - MA Diagnostic Digital Bilateral; Future  - US Breast Right Complete 4 Quadrants; Future        Patient Instructions   Call [facility] to schedule breast imaging.  Call .        Return in about 4 weeks (around 2023) for if not improving.    Adrienne Chin PA-C  [facility] [place]      Billin min spent on patient today including chart review, history, exam, and explaining treatment plan and follow-up.     Subjective   Deb is a 37 year old accompanied by her Self, presenting for the following health issues:  Breast Pain and heel pain    PAP is due per pt.    History of Present Illness       Reason for visit:  Achilles heel pain and breast pain  Symptom onset:  More than a month  Symptoms include:  Pain  Symptom intensity:  Moderate  Symptom progression:  Staying the same  Had these symptoms before:  Yes  Has tried/received treatment for these symptoms:  No    She eats 2-3 servings of fruits and vegetables daily.She consumes 0 sweetened beverage(s) daily.She exercises with enough effort to increase her heart rate 20 to 29 minutes per day.  She exercises with enough effort to increase her heart rate 3 or less days per week.   She is taking medications regularly.     In  had biopsy of right breast, benign. Comes and goes pain in that area. Has had pain only for about 6 months. The lump is in same area, hasn't changed from before. No nipple discharge. No FH of breast cancer. No constitutional symptoms.       Achilles---happened 1-2 years ago with running. Tries stretches, etc. Got better on its own. But after running again the pain came back. Calf feels tight also. Trying not to run as much. No previous surgery to area. Is  "wondering about pt.  Pain goes away with rest.  No traumatic injury.               Review of Systems   Constitutional, HEENT, cardiovascular, pulmonary, GI, , musculoskeletal, neuro, skin, endocrine and psych systems are negative, except as otherwise noted.      Objective    /71   Pulse 88   Temp 97.7  F (36.5  C) (Tympanic)   Resp 14   Ht 1.702 m (5' 7\")   Wt 71.2 kg (157 lb)   SpO2 100%   Breastfeeding No   BMI 24.59 kg/m    Body mass index is 24.59 kg/m .  Physical Exam   GENERAL: healthy, alert and no distress  RESP: lungs clear to auscultation - no rales, rhonchi or wheezes  BREAST: {:exam normal except does have more fibrois tissue in area of concern R breast just inferior to areola. No LA in axilla. No tenderness currently. No erythema or overlying skin changes  CV: regular rate and rhythm, normal S1 S2, no S3 or S4, no murmur, click or rub, no peripheral edema and peripheral pulses strong  MS: no gross musculoskeletal defects noted, no edema  NEURO: Normal strength and tone, mentation intact and speech normal  PSYCH: mentation appears normal, affect normal/bright  Achilles: tender over right achilles. No lumps palpated. Achilles is intact. Tender over gastrocnemius and calf muscles as well. No evidence of clot. No warmth or erytehma.                 "

## 2023-03-17 ENCOUNTER — OFFICE VISIT (OUTPATIENT)
Dept: FAMILY MEDICINE | Facility: CLINIC | Age: 38
End: 2023-03-17
Payer: COMMERCIAL

## 2023-03-17 VITALS
WEIGHT: 157 LBS | SYSTOLIC BLOOD PRESSURE: 117 MMHG | HEIGHT: 67 IN | RESPIRATION RATE: 14 BRPM | HEART RATE: 88 BPM | BODY MASS INDEX: 24.64 KG/M2 | DIASTOLIC BLOOD PRESSURE: 71 MMHG | TEMPERATURE: 97.7 F | OXYGEN SATURATION: 100 %

## 2023-03-17 DIAGNOSIS — M76.60 ACHILLES TENDON PAIN: Primary | ICD-10-CM

## 2023-03-17 DIAGNOSIS — N64.4 BREAST PAIN, RIGHT: ICD-10-CM

## 2023-03-17 PROCEDURE — 99214 OFFICE O/P EST MOD 30 MIN: CPT | Performed by: PHYSICIAN ASSISTANT

## 2023-03-17 ASSESSMENT — PAIN SCALES - GENERAL: PAINLEVEL: MILD PAIN (3)

## 2023-03-22 ENCOUNTER — ANCILLARY PROCEDURE (OUTPATIENT)
Dept: MAMMOGRAPHY | Facility: CLINIC | Age: 38
End: 2023-03-22
Attending: PHYSICIAN ASSISTANT
Payer: COMMERCIAL

## 2023-03-22 DIAGNOSIS — N64.4 BREAST PAIN, RIGHT: ICD-10-CM

## 2023-03-22 PROCEDURE — 77066 DX MAMMO INCL CAD BI: CPT | Performed by: RADIOLOGY

## 2023-03-22 PROCEDURE — G0279 TOMOSYNTHESIS, MAMMO: HCPCS | Performed by: RADIOLOGY

## 2023-03-22 PROCEDURE — 76642 ULTRASOUND BREAST LIMITED: CPT | Mod: RT | Performed by: RADIOLOGY

## 2023-04-06 ENCOUNTER — THERAPY VISIT (OUTPATIENT)
Dept: PHYSICAL THERAPY | Facility: CLINIC | Age: 38
End: 2023-04-06
Attending: PHYSICIAN ASSISTANT
Payer: COMMERCIAL

## 2023-04-06 DIAGNOSIS — M76.60 ACHILLES TENDON PAIN: ICD-10-CM

## 2023-04-06 DIAGNOSIS — M76.61 ACHILLES TENDINITIS OF RIGHT LOWER EXTREMITY: ICD-10-CM

## 2023-04-06 PROCEDURE — 97110 THERAPEUTIC EXERCISES: CPT | Mod: GP | Performed by: PHYSICAL THERAPIST

## 2023-04-06 PROCEDURE — 97161 PT EVAL LOW COMPLEX 20 MIN: CPT | Mod: GP | Performed by: PHYSICAL THERAPIST

## 2023-04-06 NOTE — PROGRESS NOTES
Physical Therapy Initial Evaluation  Subjective:  The history is provided by the patient. No  was used.   Therapist Generated HPI Evaluation  Problem details: Pt reports that spring 2022 (April 2022) was doing some running and developed R achilles pain. Did some stretches and it resolved. In the summer it returned. Has noticed the symptoms off and on and stretches didn't manage it. It's to the point that she can feel the area with walking, running and stairs. Referred to PT. .         Type of problem:  Right ankle.    This is a chronic condition.  Condition occurred with:  Insidious onset.  Where condition occurred: during recreation/sport.  Patient reports pain:  Posterior (achilles).  Pain is described as sharp and is intermittent.  Pain radiates to:  No radiation. Pain is the same all the time.  Since onset symptoms are unchanged.  Associated symptoms:  Loss of motion/stiffness. Symptoms are exacerbated by descending stairs, running and walking  and relieved by rest.      Restrictions due to condition include:  Working in normal job without restrictions.  Barriers include:  None as reported by patient.    Patient Health History         Pain is reported as 5/10 on pain scale.    Pertinent medical history includes: none.   Red flags:  None as reported by patient.  Medical allergies: none.   Surgeries include:  None.    Current medications:  None.    Current occupation is .   Primary job tasks include:  Computer work.                                    Objective:  Standing Alignment:                Ankle/Foot:  Normal    Gait:  Pt reports tightness in R calf    Gait Type:  Normal         Flexibility/Screens:       Lower Extremity:      Decreased right lower extremity flexibility:  Gastroc and Soleus          Ankle/Foot Evaluation  ROM:    AROM:    Dorsiflexion:  Left:   5  Right:   2              Strength is normal.      PALPATION: Palpation of ankle: thickened distal achilles on R  compared to L.    Right ankle tenderness present at:   achilles tendon  EDEMA: normal                                                              General     ROS    Assessment/Plan:    Patient is a 37 year old female with right side ankle complaints.    Patient has the following significant findings with corresponding treatment plan.                Diagnosis 1:  R achilles tendinosis  Pain -  manual therapy, self management, education and home program  Decreased ROM/flexibility - manual therapy, therapeutic exercise, therapeutic activity and home program  Inflammation - self management/home program  Decreased function - therapeutic activities and home program    1) Decision-Making    Low complexity using standardized patient assessment instrument and/or measureable assessment of functional outcome.  Cumulative Therapy Evaluation is: Low complexity.    Previous and current functional limitations:  (See Goal Flow Sheet for this information)    Short term and Long term goals: (See Goal Flow Sheet for this information)     Communication ability:  Patient appears to be able to clearly communicate and understand verbal and written communication and follow directions correctly.  Treatment Explanation - The following has been discussed with the patient:   RX ordered/plan of care  Anticipated outcomes  Possible risks and side effects  This patient would benefit from PT intervention to resume normal activities.   Rehab potential is excellent.    Frequency:  1 X week, once daily  Duration:  for 12 weeks  Discharge Plan:  Achieve all LTG.  Independent in home treatment program.  Reach maximal therapeutic benefit.    Please refer to the daily flowsheet for treatment today, total treatment time and time spent performing 1:1 timed codes.

## 2023-04-11 NOTE — PROGRESS NOTES
Subjective:  HPI  Physical Exam                    Objective:  System    Physical Exam    General     ROS    Assessment/Plan:    SUBJECTIVE  Subjective changes as noted by pt:  Tried pressups a few days and it had no effect. Has performed the eccentric heelriases 2x/day, does not really feel the symptoms during that, only in the calf. Feels her pain going down stairs and if she's run.        Current pain level: 3/10     Changes in function:  Yes (See Goal flowsheet attached for changes in current functional level)     Adverse reaction to treatment or activity:  None    OBJECTIVE  Changes in objective findings:  Yes, pt denies pain with eccentric heelraise. Very tender upon palpation distal 4 inches of R achilles tendon.         ASSESSMENT  Deb continues to require intervention to meet STG and LTG's: PT  No change of symptoms has been noted.  Response to therapy has shown lack of progress in  pain level  Progress made towards STG/LTG?  Yes (See Goal flowsheet attached for updates on achievement of STG and LTG)    PLAN  Continue current treatment plan until patient demonstrates readiness to progress to higher level exercises.    PTA/ATC plan:  N/A    Please refer to the daily flowsheet for treatment today, total treatment time and time spent performing 1:1 timed codes.

## 2023-04-13 ENCOUNTER — THERAPY VISIT (OUTPATIENT)
Dept: PHYSICAL THERAPY | Facility: CLINIC | Age: 38
End: 2023-04-13
Attending: PHYSICIAN ASSISTANT
Payer: COMMERCIAL

## 2023-04-13 DIAGNOSIS — M76.61 ACHILLES TENDINITIS OF RIGHT LOWER EXTREMITY: Primary | ICD-10-CM

## 2023-04-13 PROCEDURE — 97140 MANUAL THERAPY 1/> REGIONS: CPT | Mod: GP | Performed by: PHYSICAL THERAPIST

## 2023-04-13 PROCEDURE — 97110 THERAPEUTIC EXERCISES: CPT | Mod: GP | Performed by: PHYSICAL THERAPIST

## 2023-04-20 ENCOUNTER — THERAPY VISIT (OUTPATIENT)
Dept: PHYSICAL THERAPY | Facility: CLINIC | Age: 38
End: 2023-04-20
Attending: PHYSICIAN ASSISTANT
Payer: COMMERCIAL

## 2023-04-20 DIAGNOSIS — M76.61 ACHILLES TENDINITIS OF RIGHT LOWER EXTREMITY: Primary | ICD-10-CM

## 2023-04-20 PROCEDURE — 97110 THERAPEUTIC EXERCISES: CPT | Mod: GP | Performed by: PHYSICAL THERAPIST

## 2023-04-20 PROCEDURE — 97140 MANUAL THERAPY 1/> REGIONS: CPT | Mod: GP | Performed by: PHYSICAL THERAPIST

## 2023-04-20 PROCEDURE — 97035 APP MDLTY 1+ULTRASOUND EA 15: CPT | Mod: GP | Performed by: PHYSICAL THERAPIST

## 2023-04-20 NOTE — PROGRESS NOTES
"Subjective:  HPI  Physical Exam                    Objective:  System    Physical Exam    General     ROS    Assessment/Plan:    SUBJECTIVE  Subjective changes as noted by pt:  Did a lot of walking the evening of last session and was more sore afterward. Had continued her exercises and notes that was thinking she might need to stop but as of yesterday noticed a marked decreased in the sharp pain. It hasn't felt that good in several months.      Current pain level: 3/10     Changes in function:  Yes (See Goal flowsheet attached for changes in current functional level)     Adverse reaction to treatment or activity:  None    OBJECTIVE  Changes in objective findings:  Yes, significant decrease pt reported pain on 8\" step down following treatment.         ASSESSMENT  Deb continues to require intervention to meet STG and LTG's: PT  Patient's symptoms are resolving.  Response to therapy has shown an improvement in  pain level  Progress made towards STG/LTG?  Yes (See Goal flowsheet attached for updates on achievement of STG and LTG)    PLAN  Continue current treatment plan until patient demonstrates readiness to progress to higher level exercises.    PTA/ATC plan:  N/A    Please refer to the daily flowsheet for treatment today, total treatment time and time spent performing 1:1 timed codes.        "

## 2023-04-27 ENCOUNTER — THERAPY VISIT (OUTPATIENT)
Dept: PHYSICAL THERAPY | Facility: CLINIC | Age: 38
End: 2023-04-27
Payer: COMMERCIAL

## 2023-04-27 DIAGNOSIS — M76.61 ACHILLES TENDINITIS OF RIGHT LOWER EXTREMITY: Primary | ICD-10-CM

## 2023-04-27 PROCEDURE — 97140 MANUAL THERAPY 1/> REGIONS: CPT | Mod: GP | Performed by: PHYSICAL THERAPIST

## 2023-04-27 PROCEDURE — 97110 THERAPEUTIC EXERCISES: CPT | Mod: GP | Performed by: PHYSICAL THERAPIST

## 2023-04-27 PROCEDURE — 97035 APP MDLTY 1+ULTRASOUND EA 15: CPT | Mod: GP | Performed by: PHYSICAL THERAPIST

## 2023-04-27 NOTE — PROGRESS NOTES
"Subjective:  HPI  Physical Exam                    Objective:  System    Physical Exam    General     ROS    Assessment/Plan:    SUBJECTIVE  Subjective changes as noted by pt:  Feels like it's better, the stretching is very intense. Going down the stairs feels good, but not as good as it did right after last session.        Current pain level: 4/10     Changes in function:  Yes (See Goal flowsheet attached for changes in current functional level)     Adverse reaction to treatment or activity:  None    OBJECTIVE  Changes in objective findings:  Yes, continued tenderness upon palpation of R achilles. Min pain 8\" step down.         ASSESSMENT  Deb continues to require intervention to meet STG and LTG's: PT  Patient's symptoms are resolving.  Response to therapy has shown an improvement in  pain level  Progress made towards STG/LTG?  Yes (See Goal flowsheet attached for updates on achievement of STG and LTG)    PLAN  Continue current treatment plan until patient demonstrates readiness to progress to higher level exercises.    PTA/ATC plan:  N/A    Please refer to the daily flowsheet for treatment today, total treatment time and time spent performing 1:1 timed codes.        "

## 2023-05-04 ENCOUNTER — THERAPY VISIT (OUTPATIENT)
Dept: PHYSICAL THERAPY | Facility: CLINIC | Age: 38
End: 2023-05-04
Payer: COMMERCIAL

## 2023-05-04 DIAGNOSIS — M76.61 ACHILLES TENDINITIS OF RIGHT LOWER EXTREMITY: Primary | ICD-10-CM

## 2023-05-04 PROCEDURE — 97140 MANUAL THERAPY 1/> REGIONS: CPT | Mod: GP | Performed by: PHYSICAL THERAPIST

## 2023-05-04 PROCEDURE — 97110 THERAPEUTIC EXERCISES: CPT | Mod: GP | Performed by: PHYSICAL THERAPIST

## 2023-05-04 PROCEDURE — 97035 APP MDLTY 1+ULTRASOUND EA 15: CPT | Mod: GP | Performed by: PHYSICAL THERAPIST

## 2023-05-19 ENCOUNTER — THERAPY VISIT (OUTPATIENT)
Dept: PHYSICAL THERAPY | Facility: CLINIC | Age: 38
End: 2023-05-19
Payer: COMMERCIAL

## 2023-05-19 DIAGNOSIS — M76.61 ACHILLES TENDINITIS OF RIGHT LOWER EXTREMITY: Primary | ICD-10-CM

## 2023-05-19 PROCEDURE — 97140 MANUAL THERAPY 1/> REGIONS: CPT | Mod: GP | Performed by: PHYSICAL THERAPIST

## 2023-05-19 PROCEDURE — 97035 APP MDLTY 1+ULTRASOUND EA 15: CPT | Mod: GP | Performed by: PHYSICAL THERAPIST

## 2023-05-19 PROCEDURE — 97110 THERAPEUTIC EXERCISES: CPT | Mod: GP | Performed by: PHYSICAL THERAPIST

## 2023-06-09 PROBLEM — M76.61 ACHILLES TENDINITIS OF RIGHT LOWER EXTREMITY: Status: RESOLVED | Noted: 2023-04-06 | Resolved: 2023-06-09

## 2023-06-09 NOTE — PROGRESS NOTES
DISCHARGE  Reason for Discharge: Patient has failed to schedule further appointments.    Equipment Issued:     Discharge Plan: Patient to continue home program.    Referring Provider:  Adrienne Chin

## 2023-07-15 ENCOUNTER — HEALTH MAINTENANCE LETTER (OUTPATIENT)
Age: 38
End: 2023-07-15

## 2023-07-21 ENCOUNTER — TELEPHONE (OUTPATIENT)
Dept: FAMILY MEDICINE | Facility: CLINIC | Age: 38
End: 2023-07-21
Payer: COMMERCIAL

## 2023-07-21 NOTE — LETTER
August 2, 2023    To  Deb Olivera  [address]  [facility] [address]    Your team at [facility] cares about your health. We have reviewed your chart and based on our findings; we are making the following recommendations to better manage your health.     You are in particular need of attention regarding the following:     Schedule a primary care office visit with your provider for a Pap Smear to screen for Cervical Cancer.  PREVENTATIVE VISIT: Physical    If you have already completed these items, please contact the clinic via phone or   [employer] so your care team can review and update your records. Thank you for   choosing [facility] for your healthcare needs. For any questions,   concerns, or to schedule an appointment please contact our clinic.    Healthy Regards,      Your [facility] Care Team

## 2023-07-21 NOTE — TELEPHONE ENCOUNTER
Patient Quality Outreach    Patient is due for the following:   Cervical Cancer Screening - PAP Needed  Physical Preventive Adult Physical    Type of outreach:    Sent [employer] message.      Questions for provider review:    None           Chiquis Arthur MA  Chart routed to Care Team.

## 2023-08-02 NOTE — TELEPHONE ENCOUNTER
Patient Quality Outreach    Type of outreach:    Sent letter.    Next Steps:  Reach out within 90 days via Phone, MyChart, and Letter.    Max number of attempts reached: No. Will try again in 90 days if patient still on fail list.        Chiquis Arthur MA  Chart routed to Care Team.

## 2023-08-15 NOTE — PATIENT INSTRUCTIONS
You can begin exercises for the core abdominal muscles without weights as tolerated.    Ultrasound for the area in breast ordered.  You can call to schedule if the area increases in size or fails to resolve in the next 2 weeks.   You can call [phone] to schedule this at the [facility] in [facility] today.   No

## 2023-09-18 PROCEDURE — 88305 TISSUE EXAM BY PATHOLOGIST: CPT | Mod: 26 | Performed by: PATHOLOGY

## 2023-09-18 PROCEDURE — 88305 TISSUE EXAM BY PATHOLOGIST: CPT | Mod: TC,ORL | Performed by: OBSTETRICS & GYNECOLOGY

## 2023-09-19 ENCOUNTER — LAB REQUISITION (OUTPATIENT)
Dept: LAB | Facility: CLINIC | Age: 38
End: 2023-09-19
Payer: COMMERCIAL

## 2023-09-21 LAB
PATH REPORT.COMMENTS IMP SPEC: NORMAL
PATH REPORT.COMMENTS IMP SPEC: NORMAL
PATH REPORT.FINAL DX SPEC: NORMAL
PATH REPORT.GROSS SPEC: NORMAL
PATH REPORT.MICROSCOPIC SPEC OTHER STN: NORMAL
PATH REPORT.RELEVANT HX SPEC: NORMAL
PHOTO IMAGE: NORMAL

## 2023-12-14 ENCOUNTER — TELEPHONE (OUTPATIENT)
Dept: FAMILY MEDICINE | Facility: CLINIC | Age: 38
End: 2023-12-14

## 2023-12-14 NOTE — TELEPHONE ENCOUNTER
Patient Quality Outreach    Patient is due for the following:   Cervical Cancer Screening - PAP Needed  Physical Preventive Adult Physical      Topic Date Due    Hepatitis B Vaccine (1 of 3 - 3-dose series) Never done    Flu Vaccine (1) 09/01/2023    COVID-19 Vaccine (4 - 2023-24 season) 09/01/2023         Type of outreach:    Sent [employer] message.      Questions for provider review:    None           Chiquis Arthur MA  Chart routed to Care Team.

## 2023-12-14 NOTE — LETTER
January 15, 2024    To  Deb Olivera  [address]  [facility] [address]    Your team at [facility] cares about your health. We have reviewed your chart and based on our findings; we are making the following recommendations to better manage your health.     We see you have read your [employer] message but have not scheduled. Please call [phone] to schedule.   You are in particular need of attention regarding the following:     Schedule a primary care office visit with your provider for a Pap Smear to screen for Cervical Cancer.  Please schedule a Nurse Only Appointment with your primary care clinic to update your immunizations that are due.  PREVENTATIVE VISIT: Physical    If you have already completed these items, please contact the clinic via phone or   [employer] so your care team can review and update your records. Thank you for   choosing [facility] for your healthcare needs. For any questions,   concerns, or to schedule an appointment please contact our clinic.    Healthy Regards,      Your [facility] Care Team

## 2024-02-01 ENCOUNTER — ANCILLARY PROCEDURE (OUTPATIENT)
Dept: MAMMOGRAPHY | Facility: CLINIC | Age: 39
End: 2024-02-01
Attending: OBSTETRICS & GYNECOLOGY
Payer: COMMERCIAL

## 2024-02-01 DIAGNOSIS — Z12.31 VISIT FOR SCREENING MAMMOGRAM: ICD-10-CM

## 2024-02-01 PROCEDURE — 77063 BREAST TOMOSYNTHESIS BI: CPT | Mod: TC | Performed by: RADIOLOGY

## 2024-02-01 PROCEDURE — 77067 SCR MAMMO BI INCL CAD: CPT | Mod: TC | Performed by: RADIOLOGY

## 2024-10-29 ASSESSMENT — ANXIETY QUESTIONNAIRES: GAD7 TOTAL SCORE: 2

## 2024-10-31 ASSESSMENT — ANXIETY QUESTIONNAIRES
GAD7 TOTAL SCORE: 4
GAD7 TOTAL SCORE: 0

## 2025-08-03 ENCOUNTER — HEALTH MAINTENANCE LETTER (OUTPATIENT)
Age: 40
End: 2025-08-03

## (undated) RX ORDER — FENTANYL CITRATE 50 UG/ML
INJECTION, SOLUTION INTRAMUSCULAR; INTRAVENOUS
Status: DISPENSED
Start: 2019-01-07

## (undated) RX ORDER — DIPHENHYDRAMINE HYDROCHLORIDE 50 MG/ML
INJECTION INTRAMUSCULAR; INTRAVENOUS
Status: DISPENSED
Start: 2019-01-07

## (undated) RX ORDER — SIMETHICONE 20 MG/.3ML
EMULSION ORAL
Status: DISPENSED
Start: 2019-01-07